# Patient Record
Sex: FEMALE | Race: WHITE | Employment: OTHER | URBAN - METROPOLITAN AREA
[De-identification: names, ages, dates, MRNs, and addresses within clinical notes are randomized per-mention and may not be internally consistent; named-entity substitution may affect disease eponyms.]

---

## 2017-06-22 ENCOUNTER — LAB ENCOUNTER (OUTPATIENT)
Dept: LAB | Facility: HOSPITAL | Age: 82
End: 2017-06-22
Payer: MEDICARE

## 2017-06-22 DIAGNOSIS — E78.00 HYPERCHOLESTEREMIA: ICD-10-CM

## 2017-06-22 DIAGNOSIS — I10 HTN (HYPERTENSION): Primary | ICD-10-CM

## 2017-06-22 PROCEDURE — 85025 COMPLETE CBC W/AUTO DIFF WBC: CPT

## 2017-06-22 PROCEDURE — 84443 ASSAY THYROID STIM HORMONE: CPT

## 2017-06-22 PROCEDURE — 80053 COMPREHEN METABOLIC PANEL: CPT

## 2017-06-22 PROCEDURE — 36415 COLL VENOUS BLD VENIPUNCTURE: CPT

## 2017-06-22 PROCEDURE — 80061 LIPID PANEL: CPT

## 2018-08-30 ENCOUNTER — LAB ENCOUNTER (OUTPATIENT)
Dept: LAB | Facility: HOSPITAL | Age: 83
End: 2018-08-30
Payer: MEDICARE

## 2018-08-30 DIAGNOSIS — E78.5 HYPERLIPIDEMIA: ICD-10-CM

## 2018-08-30 DIAGNOSIS — M10.9 GOUT: Primary | ICD-10-CM

## 2018-08-30 DIAGNOSIS — M19.90 DJD (DEGENERATIVE JOINT DISEASE): ICD-10-CM

## 2018-08-30 DIAGNOSIS — I10 HYPERTENSION: ICD-10-CM

## 2018-08-30 LAB
ALBUMIN SERPL BCP-MCNC: 3.9 G/DL (ref 3.5–4.8)
ALBUMIN/GLOB SERPL: 1.4 {RATIO} (ref 1–2)
ALP SERPL-CCNC: 82 U/L (ref 32–100)
ALT SERPL-CCNC: 23 U/L (ref 14–54)
ANION GAP SERPL CALC-SCNC: 11 MMOL/L (ref 0–18)
AST SERPL-CCNC: 26 U/L (ref 15–41)
BASOPHILS # BLD: 0 K/UL (ref 0–0.2)
BASOPHILS NFR BLD: 1 %
BILIRUB SERPL-MCNC: 1.2 MG/DL (ref 0.3–1.2)
BUN SERPL-MCNC: 11 MG/DL (ref 8–20)
BUN/CREAT SERPL: 11 (ref 10–20)
CALCIUM SERPL-MCNC: 9.2 MG/DL (ref 8.5–10.5)
CHLORIDE SERPL-SCNC: 102 MMOL/L (ref 95–110)
CHOLEST SERPL-MCNC: 174 MG/DL (ref 110–200)
CO2 SERPL-SCNC: 28 MMOL/L (ref 22–32)
CREAT SERPL-MCNC: 1 MG/DL (ref 0.5–1.5)
EOSINOPHIL # BLD: 0.3 K/UL (ref 0–0.7)
EOSINOPHIL NFR BLD: 5 %
ERYTHROCYTE [DISTWIDTH] IN BLOOD BY AUTOMATED COUNT: 13.6 % (ref 11–15)
GLOBULIN PLAS-MCNC: 2.8 G/DL (ref 2.5–3.7)
GLUCOSE SERPL-MCNC: 106 MG/DL (ref 70–99)
HCT VFR BLD AUTO: 44 % (ref 35–48)
HDLC SERPL-MCNC: 37 MG/DL
HGB BLD-MCNC: 14.5 G/DL (ref 12–16)
LDLC SERPL DIRECT ASSAY-MCNC: 68 MG/DL (ref 0–99)
LYMPHOCYTES # BLD: 1.8 K/UL (ref 1–4)
LYMPHOCYTES NFR BLD: 32 %
MCH RBC QN AUTO: 30.9 PG (ref 27–32)
MCHC RBC AUTO-ENTMCNC: 33.1 G/DL (ref 32–37)
MCV RBC AUTO: 93.4 FL (ref 80–100)
MONOCYTES # BLD: 0.5 K/UL (ref 0–1)
MONOCYTES NFR BLD: 8 %
NEUTROPHILS # BLD AUTO: 3.1 K/UL (ref 1.8–7.7)
NEUTROPHILS NFR BLD: 55 %
NONHDLC SERPL-MCNC: 137 MG/DL
OSMOLALITY UR CALC.SUM OF ELEC: 292 MOSM/KG (ref 275–295)
PATIENT FASTING: YES
PLATELET # BLD AUTO: 290 K/UL (ref 140–400)
PMV BLD AUTO: 7.6 FL (ref 7.4–10.3)
POTASSIUM SERPL-SCNC: 3.8 MMOL/L (ref 3.3–5.1)
PROT SERPL-MCNC: 6.7 G/DL (ref 5.9–8.4)
RBC # BLD AUTO: 4.71 M/UL (ref 3.7–5.4)
SODIUM SERPL-SCNC: 141 MMOL/L (ref 136–144)
TRIGL SERPL-MCNC: 401 MG/DL (ref 1–149)
TSH SERPL-ACNC: 3.27 UIU/ML (ref 0.45–5.33)
WBC # BLD AUTO: 5.6 K/UL (ref 4–11)

## 2018-08-30 PROCEDURE — 80053 COMPREHEN METABOLIC PANEL: CPT

## 2018-08-30 PROCEDURE — 80061 LIPID PANEL: CPT

## 2018-08-30 PROCEDURE — 82306 VITAMIN D 25 HYDROXY: CPT

## 2018-08-30 PROCEDURE — 36415 COLL VENOUS BLD VENIPUNCTURE: CPT

## 2018-08-30 PROCEDURE — 85025 COMPLETE CBC W/AUTO DIFF WBC: CPT

## 2018-08-30 PROCEDURE — 84443 ASSAY THYROID STIM HORMONE: CPT

## 2018-08-30 PROCEDURE — 83721 ASSAY OF BLOOD LIPOPROTEIN: CPT

## 2018-08-31 LAB — 25(OH)D3 SERPL-MCNC: 31.5 NG/ML

## 2019-10-10 ENCOUNTER — LAB ENCOUNTER (OUTPATIENT)
Dept: LAB | Facility: HOSPITAL | Age: 84
End: 2019-10-10
Payer: MEDICARE

## 2019-10-10 DIAGNOSIS — H53.2 DIPLOPIA: Primary | ICD-10-CM

## 2019-10-10 DIAGNOSIS — I10 ESSENTIAL HYPERTENSION, MALIGNANT: ICD-10-CM

## 2019-10-10 PROCEDURE — 85652 RBC SED RATE AUTOMATED: CPT

## 2019-10-10 PROCEDURE — 80061 LIPID PANEL: CPT

## 2019-10-10 PROCEDURE — 36415 COLL VENOUS BLD VENIPUNCTURE: CPT

## 2019-10-10 PROCEDURE — 86140 C-REACTIVE PROTEIN: CPT

## 2019-11-05 PROBLEM — Z96.1 BILATERAL PSEUDOPHAKIA: Status: ACTIVE | Noted: 2019-11-05

## 2019-11-05 PROBLEM — H35.3131 EARLY DRY STAGE NONEXUDATIVE AGE-RELATED MACULAR DEGENERATION OF BOTH EYES: Status: ACTIVE | Noted: 2019-11-05

## 2019-11-05 PROBLEM — G70.00 MG (MYASTHENIA GRAVIS) (HCC): Status: ACTIVE | Noted: 2019-11-05

## 2019-11-05 PROBLEM — H04.123 DRY EYE SYNDROME OF BILATERAL LACRIMAL GLANDS: Status: ACTIVE | Noted: 2019-11-05

## 2020-01-09 ENCOUNTER — LAB ENCOUNTER (OUTPATIENT)
Dept: LAB | Facility: HOSPITAL | Age: 85
End: 2020-01-09
Payer: MEDICARE

## 2020-01-09 DIAGNOSIS — I72.9 ANEURYSM OF UNSPECIFIED SITE (HCC): ICD-10-CM

## 2020-01-09 DIAGNOSIS — R49.9 VOICE IMPAIRMENT: ICD-10-CM

## 2020-01-09 DIAGNOSIS — R53.1 ASTHENIA: ICD-10-CM

## 2020-01-09 DIAGNOSIS — G70.00 MYASTHENIA GRAVIS WITHOUT EXACERBATION (HCC): Primary | ICD-10-CM

## 2020-01-09 LAB
ALBUMIN SERPL-MCNC: 3.4 G/DL (ref 3.4–5)
ALBUMIN/GLOB SERPL: 1 {RATIO} (ref 1–2)
ALP LIVER SERPL-CCNC: 80 U/L (ref 55–142)
ALT SERPL-CCNC: 26 U/L (ref 13–56)
ANION GAP SERPL CALC-SCNC: 5 MMOL/L (ref 0–18)
AST SERPL-CCNC: 21 U/L (ref 15–37)
BASOPHILS # BLD AUTO: 0.03 X10(3) UL (ref 0–0.2)
BASOPHILS NFR BLD AUTO: 0.5 %
BILIRUB SERPL-MCNC: 0.8 MG/DL (ref 0.1–2)
BUN BLD-MCNC: 16 MG/DL (ref 7–18)
BUN/CREAT SERPL: 17 (ref 10–20)
CALCIUM BLD-MCNC: 8.8 MG/DL (ref 8.5–10.1)
CHLORIDE SERPL-SCNC: 104 MMOL/L (ref 98–112)
CO2 SERPL-SCNC: 31 MMOL/L (ref 21–32)
CREAT BLD-MCNC: 0.94 MG/DL (ref 0.55–1.02)
DEPRECATED RDW RBC AUTO: 44.9 FL (ref 35.1–46.3)
EOSINOPHIL # BLD AUTO: 0.2 X10(3) UL (ref 0–0.7)
EOSINOPHIL NFR BLD AUTO: 3.1 %
ERYTHROCYTE [DISTWIDTH] IN BLOOD BY AUTOMATED COUNT: 12.9 % (ref 11–15)
GLOBULIN PLAS-MCNC: 3.4 G/DL (ref 2.8–4.4)
GLUCOSE BLD-MCNC: 127 MG/DL (ref 70–99)
HCT VFR BLD AUTO: 42.8 % (ref 35–48)
HGB BLD-MCNC: 13.9 G/DL (ref 12–16)
IMM GRANULOCYTES # BLD AUTO: 0.02 X10(3) UL (ref 0–1)
IMM GRANULOCYTES NFR BLD: 0.3 %
LYMPHOCYTES # BLD AUTO: 1.57 X10(3) UL (ref 1–4)
LYMPHOCYTES NFR BLD AUTO: 24.5 %
M PROTEIN MFR SERPL ELPH: 6.8 G/DL (ref 6.4–8.2)
MCH RBC QN AUTO: 30.8 PG (ref 26–34)
MCHC RBC AUTO-ENTMCNC: 32.5 G/DL (ref 31–37)
MCV RBC AUTO: 94.7 FL (ref 80–100)
MONOCYTES # BLD AUTO: 0.47 X10(3) UL (ref 0.1–1)
MONOCYTES NFR BLD AUTO: 7.3 %
NEUTROPHILS # BLD AUTO: 4.13 X10 (3) UL (ref 1.5–7.7)
NEUTROPHILS # BLD AUTO: 4.13 X10(3) UL (ref 1.5–7.7)
NEUTROPHILS NFR BLD AUTO: 64.3 %
OSMOLALITY SERPL CALC.SUM OF ELEC: 293 MOSM/KG (ref 275–295)
PATIENT FASTING Y/N/NP: YES
PLATELET # BLD AUTO: 289 10(3)UL (ref 150–450)
POTASSIUM SERPL-SCNC: 3.5 MMOL/L (ref 3.5–5.1)
RBC # BLD AUTO: 4.52 X10(6)UL (ref 3.8–5.3)
SODIUM SERPL-SCNC: 140 MMOL/L (ref 136–145)
WBC # BLD AUTO: 6.4 X10(3) UL (ref 4–11)

## 2020-01-09 PROCEDURE — 80053 COMPREHEN METABOLIC PANEL: CPT

## 2020-01-09 PROCEDURE — 85025 COMPLETE CBC W/AUTO DIFF WBC: CPT

## 2020-01-09 PROCEDURE — 36415 COLL VENOUS BLD VENIPUNCTURE: CPT

## 2021-04-11 ENCOUNTER — OFFICE VISIT (OUTPATIENT)
Dept: FAMILY MEDICINE CLINIC | Facility: CLINIC | Age: 86
End: 2021-04-11
Payer: MEDICARE

## 2021-04-11 VITALS — TEMPERATURE: 99 F

## 2021-04-11 DIAGNOSIS — H61.21 IMPACTED CERUMEN OF RIGHT EAR: Primary | ICD-10-CM

## 2021-04-11 PROCEDURE — 69209 REMOVE IMPACTED EAR WAX UNI: CPT | Performed by: NURSE PRACTITIONER

## 2021-04-11 NOTE — PROGRESS NOTES
CHIEF COMPLAINT:   Patient presents with:  Cerumen Impaction      HPI:   Precious Adame is a 80year old female who presents to clinic today with complaints of clogged ears. Patient requesting ear flush.  Patient reports ongoing issue with cerumen build agreed to proceed with procedure via water removal and/or use of curette. Location: Right ear   Indication: TM not visible,fullness. Prep: Warm water via ear elephant. Hydrogen Peroxide not used  Removal: Lavage alone was was successful.   not need for Infections, such as an outer ear infection (external otitis)  · Skin disease, such as eczema  · Autoimmune diseases, such as lupus  · A narrowed ear canal from birth, chronic inflammation, or injury  · Too much earwax because of injury  · Too much earwax b earwax if you have a health condition that causes it, such as eczema.  In other cases, you may be able to prevent earwax buildup by:   · Using ear drops once a week  · Having a regular ear cleaning about every 6 months  · Not using cotton swabs in the ear

## 2023-04-12 ENCOUNTER — OFFICE VISIT (OUTPATIENT)
Dept: FAMILY MEDICINE CLINIC | Facility: CLINIC | Age: 88
End: 2023-04-12
Payer: MEDICARE

## 2023-04-12 VITALS — TEMPERATURE: 98 F

## 2023-04-12 DIAGNOSIS — H61.23 BILATERAL IMPACTED CERUMEN: Primary | ICD-10-CM

## 2023-04-12 PROCEDURE — 69209 REMOVE IMPACTED EAR WAX UNI: CPT | Performed by: NURSE PRACTITIONER

## 2024-09-11 ENCOUNTER — OFFICE VISIT (OUTPATIENT)
Dept: NEUROLOGY | Facility: CLINIC | Age: 89
End: 2024-09-11
Payer: MEDICARE

## 2024-09-11 ENCOUNTER — LAB ENCOUNTER (OUTPATIENT)
Dept: LAB | Facility: HOSPITAL | Age: 89
End: 2024-09-11
Attending: Other
Payer: MEDICARE

## 2024-09-11 VITALS — DIASTOLIC BLOOD PRESSURE: 67 MMHG | HEART RATE: 76 BPM | OXYGEN SATURATION: 96 % | SYSTOLIC BLOOD PRESSURE: 115 MMHG

## 2024-09-11 DIAGNOSIS — G70.00 MG (MYASTHENIA GRAVIS) (HCC): Primary | ICD-10-CM

## 2024-09-11 DIAGNOSIS — G70.00 MG (MYASTHENIA GRAVIS) (HCC): ICD-10-CM

## 2024-09-11 LAB
ALBUMIN SERPL-MCNC: 4.1 G/DL (ref 3.2–4.8)
ALBUMIN/GLOB SERPL: 1.5 {RATIO} (ref 1–2)
ALP LIVER SERPL-CCNC: 97 U/L
ALT SERPL-CCNC: 15 U/L
ANION GAP SERPL CALC-SCNC: 6 MMOL/L (ref 0–18)
AST SERPL-CCNC: 29 U/L (ref ?–34)
BASOPHILS # BLD AUTO: 0.03 X10(3) UL (ref 0–0.2)
BASOPHILS NFR BLD AUTO: 0.5 %
BILIRUB SERPL-MCNC: 1.2 MG/DL (ref 0.2–0.9)
BUN BLD-MCNC: 17 MG/DL (ref 9–23)
BUN/CREAT SERPL: 20.2 (ref 10–20)
CALCIUM BLD-MCNC: 9.7 MG/DL (ref 8.7–10.4)
CHLORIDE SERPL-SCNC: 100 MMOL/L (ref 98–112)
CO2 SERPL-SCNC: 32 MMOL/L (ref 21–32)
CREAT BLD-MCNC: 0.84 MG/DL
DEPRECATED RDW RBC AUTO: 45 FL (ref 35.1–46.3)
EGFRCR SERPLBLD CKD-EPI 2021: 66 ML/MIN/1.73M2 (ref 60–?)
EOSINOPHIL # BLD AUTO: 0.18 X10(3) UL (ref 0–0.7)
EOSINOPHIL NFR BLD AUTO: 2.8 %
ERYTHROCYTE [DISTWIDTH] IN BLOOD BY AUTOMATED COUNT: 13.5 % (ref 11–15)
FASTING STATUS PATIENT QL REPORTED: NO
GLOBULIN PLAS-MCNC: 2.7 G/DL (ref 2–3.5)
GLUCOSE BLD-MCNC: 93 MG/DL (ref 70–99)
HCT VFR BLD AUTO: 37.5 %
HGB BLD-MCNC: 12.6 G/DL
IMM GRANULOCYTES # BLD AUTO: 0.06 X10(3) UL (ref 0–1)
IMM GRANULOCYTES NFR BLD: 0.9 %
LYMPHOCYTES # BLD AUTO: 1.62 X10(3) UL (ref 1–4)
LYMPHOCYTES NFR BLD AUTO: 25.1 %
MCH RBC QN AUTO: 30.5 PG (ref 26–34)
MCHC RBC AUTO-ENTMCNC: 33.6 G/DL (ref 31–37)
MCV RBC AUTO: 90.8 FL
MONOCYTES # BLD AUTO: 0.76 X10(3) UL (ref 0.1–1)
MONOCYTES NFR BLD AUTO: 11.8 %
NEUTROPHILS # BLD AUTO: 3.81 X10 (3) UL (ref 1.5–7.7)
NEUTROPHILS # BLD AUTO: 3.81 X10(3) UL (ref 1.5–7.7)
NEUTROPHILS NFR BLD AUTO: 58.9 %
OSMOLALITY SERPL CALC.SUM OF ELEC: 287 MOSM/KG (ref 275–295)
PLATELET # BLD AUTO: 239 10(3)UL (ref 150–450)
POTASSIUM SERPL-SCNC: 4.2 MMOL/L (ref 3.5–5.1)
PROT SERPL-MCNC: 6.8 G/DL (ref 5.7–8.2)
RBC # BLD AUTO: 4.13 X10(6)UL
SODIUM SERPL-SCNC: 138 MMOL/L (ref 136–145)
WBC # BLD AUTO: 6.5 X10(3) UL (ref 4–11)

## 2024-09-11 PROCEDURE — 85025 COMPLETE CBC W/AUTO DIFF WBC: CPT | Performed by: OTHER

## 2024-09-11 PROCEDURE — 36415 COLL VENOUS BLD VENIPUNCTURE: CPT

## 2024-09-11 PROCEDURE — 80053 COMPREHEN METABOLIC PANEL: CPT

## 2024-09-11 PROCEDURE — 99204 OFFICE O/P NEW MOD 45 MIN: CPT | Performed by: OTHER

## 2024-09-11 RX ORDER — MYCOPHENOLATE MOFETIL 500 MG/1
500 TABLET ORAL 2 TIMES DAILY
Qty: 180 TABLET | Refills: 3 | Status: SHIPPED | OUTPATIENT
Start: 2024-09-11

## 2024-09-11 RX ORDER — MYCOPHENOLATE MOFETIL 500 MG/1
1 TABLET ORAL 2 TIMES DAILY
COMMUNITY
Start: 2021-05-12 | End: 2024-09-11

## 2024-09-11 RX ORDER — GABAPENTIN 100 MG/1
300 CAPSULE ORAL NIGHTLY
COMMUNITY

## 2024-09-11 NOTE — PROGRESS NOTES
[facility] NEUROSCIENCES [address], [facility] [address]  [facility] [address]  [phone]            Neurology Initial Visit     Referred By: Dr. Stewart ref. provider found    Chief Complaint:   Chief Complaint   Patient presents with    Neurologic Problem     NPT with current Myasthenia Gravis. Pt used to see Dr. Queta Limon. Pt is currently having a periodic cough/sneeze, she is unaware what causes this. Pt has been having blurring in her eyesight, she has been following with Ophthalmology, who advised that the lower lid on her eye is trapping the fluid. Feet neuropathy and sciatica.       HPI:     Sharda Rai is a 89 year old female, who presents for myasthenia gravis.  Patient with history of myasthenia gravis since 2019.  Developed ocular symptoms, proptosis and double vision.  She was seropositive.  At first she was treated with IVIG for few months, she was treated with steroids, eventually she was switched from steroids to mycophenolate 500 g twice a day and she was doing quite well and for this couple years as reported first visit with me in September 2024.  She was not sure but she might have had some increased frequency of clearing her throat.  But no choking, she was eating fine..     Past Medical History:    Blepharitis    Dermatochalasis    Dry eye syndrome of both eyes    Macular pucker, bilateral    Pseudophakia, both eyes       Past Surgical History:   Procedure Laterality Date    Cataract extraction w/  intraocular lens implant Left 02/20/2013    Dr. Roseann leo     Cataract extraction w/  intraocular lens implant Right     Yag capsulotomy - od - right eye Right 04/10/2007       Social history:  History   Smoking Status    Never   Smokeless Tobacco    Never     History   Alcohol Use Never     History   Drug Use Unknown       Family History   Problem Relation Age of Onset    Retinal detachment Father     Glaucoma Mother          Current Outpatient Medications:      gabapentin 100 MG Oral Cap, Take 3 capsules (300 mg total) by mouth nightly., Disp: , Rfl:     Mycophenolate Mofetil 500 MG Oral Tab, Take 1 tablet (500 mg total) by mouth 2 (two) times daily., Disp: 180 tablet, Rfl: 3    hydrochlorothiazide 25 MG Oral Tab, Take 1 tablet (25 mg total) by mouth daily., Disp: , Rfl:     Metoprolol Succinate ER 50 MG Oral Tablet 24 Hr, Take 1 tablet (50 mg total) by mouth every morning., Disp: , Rfl:     atorvastatin 40 MG Oral Tab, , Disp: , Rfl:     aspirin EC 81 MG Oral Tab EC, Take 1 tablet (81 mg total) by mouth daily., Disp: , Rfl:     Cholecalciferol (VITAMIN D3) 014635 UNIT/GM Does not apply Powder, Vitamin D3, Disp: , Rfl:     B Complex Vitamins (VITAMIN B COMPLEX OR), vitamin B complex, Disp: , Rfl:     PANZYGA 5 GM/50ML Intravenous Solution, , Disp: , Rfl:     Allergies   Allergen Reactions    Shellfish DIARRHEA       ROS:   As in HPI, the rest of the 14 system review was done and was negative      Physical Exam:  Vitals:    09/11/24 1526   BP: 115/67   Pulse: 76   SpO2: 96%       General: No apparent distress, well nourished, well groomed.  Head- Normocephalic, atraumatic  Eyes- No redness or swelling  ENT- Hearing intake, normal glutition  Neck- No masses or adenopathy  Cv: pulses were palpable and normal, no cyanosis or edema     Neurological:     Mental Status- Alert and oriented x3.  Normal attention span and concentration  Thought process intact  Memory intact- recent and remote  Mood intact  Fund of knowledge appropriate for education and age    Language intact including: comprehension, naming, repetition, vocabulary    Cranial Nerves:    V. Facial sensation intact  VII. Face symmetric, no facial weakness  VIII. Hearing intact to whisper.  IX. Pallet elevates symmetrically.  XI. Shoulder shrug is intact  XII. Tongue is midline    Motor Exam:  Muscle tone normal  No atrophy or fasciculations  Strength- upper extremities 5/5 proximally and distally                     Rapid alternating movements intact    Gait:  Normal posture  Normal physiologic      Labs:    Lab Results   Component Value Date    TSH 3.27 08/30/2018     Lab Results   Component Value Date    HDL 42 10/10/2019    LDL 62 10/10/2019    TRIG 204 (H) 10/10/2019     Lab Results   Component Value Date    HGB 13.9 01/09/2020    HCT 42.8 01/09/2020    MCV 94.7 01/09/2020    WBC 6.4 01/09/2020    .0 01/09/2020      Lab Results   Component Value Date    BUN 16 01/09/2020    CA 8.8 01/09/2020    ALT 26 01/09/2020    AST 21 01/09/2020    ALB 3.4 01/09/2020     01/09/2020    K 3.5 01/09/2020     01/09/2020    CO2 31.0 01/09/2020      I have reviewed labs.      Assessment   1. MG (myasthenia gravis) (HCC)  Seropositive myasthenia gravis, no thymoma.  We discussed possibility of increasing the dose of mycophenolate and may be doing bridging with IVIG if she gets worse in terms of her bulbar symptoms.  Patient declined that option at this time.  She was instructed to keep them watch over her symptoms and if any worsening she will have to call us back and we will discuss changing her treatment again.    - CBC With Differential With Platelet  - Comp Metabolic Panel (14) [E]; Future           Education and counseling provided to patient. Instructed patient to call my office or seek medical attention immediately if symptoms worsen.  Patient verbalized understanding of information given. All questions were answered. All side effects of drugs were discussed.     Return to clinic in: Return in about 6 months (around 3/11/2025).    William Castillo MD

## 2024-10-18 ENCOUNTER — APPOINTMENT (OUTPATIENT)
Dept: CT IMAGING | Facility: HOSPITAL | Age: 89
End: 2024-10-18
Attending: STUDENT IN AN ORGANIZED HEALTH CARE EDUCATION/TRAINING PROGRAM
Payer: MEDICARE

## 2024-10-18 ENCOUNTER — APPOINTMENT (OUTPATIENT)
Dept: ULTRASOUND IMAGING | Facility: HOSPITAL | Age: 89
End: 2024-10-18
Attending: STUDENT IN AN ORGANIZED HEALTH CARE EDUCATION/TRAINING PROGRAM
Payer: MEDICARE

## 2024-10-18 ENCOUNTER — HOSPITAL ENCOUNTER (INPATIENT)
Facility: HOSPITAL | Age: 89
LOS: 3 days | Discharge: HOME | End: 2024-10-21
Attending: STUDENT IN AN ORGANIZED HEALTH CARE EDUCATION/TRAINING PROGRAM | Admitting: STUDENT IN AN ORGANIZED HEALTH CARE EDUCATION/TRAINING PROGRAM
Payer: MEDICARE

## 2024-10-18 DIAGNOSIS — K81.0 ACUTE CHOLECYSTITIS: Primary | ICD-10-CM

## 2024-10-18 LAB
ALBUMIN SERPL-MCNC: 4 G/DL (ref 3.2–4.8)
ALBUMIN/GLOB SERPL: 1.7 {RATIO} (ref 1–2)
ALP LIVER SERPL-CCNC: 116 U/L
ALT SERPL-CCNC: 34 U/L
ANION GAP SERPL CALC-SCNC: 5 MMOL/L (ref 0–18)
AST SERPL-CCNC: 79 U/L (ref ?–34)
BASOPHILS # BLD AUTO: 0.01 X10(3) UL (ref 0–0.2)
BASOPHILS NFR BLD AUTO: 0.1 %
BILIRUB SERPL-MCNC: 0.8 MG/DL (ref 0.2–0.9)
BUN BLD-MCNC: 21 MG/DL (ref 9–23)
BUN/CREAT SERPL: 25.9 (ref 10–20)
CALCIUM BLD-MCNC: 9.1 MG/DL (ref 8.7–10.4)
CHLORIDE SERPL-SCNC: 105 MMOL/L (ref 98–112)
CO2 SERPL-SCNC: 32 MMOL/L (ref 21–32)
CREAT BLD-MCNC: 0.81 MG/DL
DEPRECATED RDW RBC AUTO: 45.5 FL (ref 35.1–46.3)
EGFRCR SERPLBLD CKD-EPI 2021: 68 ML/MIN/1.73M2 (ref 60–?)
EOSINOPHIL # BLD AUTO: 0.15 X10(3) UL (ref 0–0.7)
EOSINOPHIL NFR BLD AUTO: 2.2 %
ERYTHROCYTE [DISTWIDTH] IN BLOOD BY AUTOMATED COUNT: 13.3 % (ref 11–15)
GLOBULIN PLAS-MCNC: 2.4 G/DL (ref 2–3.5)
GLUCOSE BLD-MCNC: 130 MG/DL (ref 70–99)
GLUCOSE BLDC GLUCOMTR-MCNC: 125 MG/DL (ref 70–99)
HCT VFR BLD AUTO: 38 %
HGB BLD-MCNC: 12.3 G/DL
IMM GRANULOCYTES # BLD AUTO: 0.02 X10(3) UL (ref 0–1)
IMM GRANULOCYTES NFR BLD: 0.3 %
LACTATE SERPL-SCNC: 1.4 MMOL/L (ref 0.5–2)
LIPASE SERPL-CCNC: 24 U/L (ref 12–53)
LYMPHOCYTES # BLD AUTO: 1.51 X10(3) UL (ref 1–4)
LYMPHOCYTES NFR BLD AUTO: 22.4 %
MCH RBC QN AUTO: 30.2 PG (ref 26–34)
MCHC RBC AUTO-ENTMCNC: 32.4 G/DL (ref 31–37)
MCV RBC AUTO: 93.4 FL
MONOCYTES # BLD AUTO: 0.47 X10(3) UL (ref 0.1–1)
MONOCYTES NFR BLD AUTO: 7 %
NEUTROPHILS # BLD AUTO: 4.57 X10 (3) UL (ref 1.5–7.7)
NEUTROPHILS # BLD AUTO: 4.57 X10(3) UL (ref 1.5–7.7)
NEUTROPHILS NFR BLD AUTO: 68 %
OSMOLALITY SERPL CALC.SUM OF ELEC: 299 MOSM/KG (ref 275–295)
PLATELET # BLD AUTO: 247 10(3)UL (ref 150–450)
POTASSIUM SERPL-SCNC: 3.6 MMOL/L (ref 3.5–5.1)
PROT SERPL-MCNC: 6.4 G/DL (ref 5.7–8.2)
RBC # BLD AUTO: 4.07 X10(6)UL
SODIUM SERPL-SCNC: 142 MMOL/L (ref 136–145)
WBC # BLD AUTO: 6.7 X10(3) UL (ref 4–11)

## 2024-10-18 PROCEDURE — 99223 1ST HOSP IP/OBS HIGH 75: CPT | Performed by: HOSPITALIST

## 2024-10-18 PROCEDURE — 76705 ECHO EXAM OF ABDOMEN: CPT | Performed by: STUDENT IN AN ORGANIZED HEALTH CARE EDUCATION/TRAINING PROGRAM

## 2024-10-18 RX ORDER — MORPHINE SULFATE 4 MG/ML
4 INJECTION, SOLUTION INTRAMUSCULAR; INTRAVENOUS EVERY 2 HOUR PRN
Status: DISCONTINUED | OUTPATIENT
Start: 2024-10-18 | End: 2024-10-20

## 2024-10-18 RX ORDER — MORPHINE SULFATE 2 MG/ML
2 INJECTION, SOLUTION INTRAMUSCULAR; INTRAVENOUS EVERY 2 HOUR PRN
Status: DISCONTINUED | OUTPATIENT
Start: 2024-10-18 | End: 2024-10-21

## 2024-10-18 RX ORDER — MELOXICAM 15 MG/1
1 TABLET ORAL DAILY
COMMUNITY
Start: 2024-03-14

## 2024-10-18 RX ORDER — CHOLECALCIFEROL (VITAMIN D3) 25 MCG
1000 TABLET ORAL DAILY
COMMUNITY

## 2024-10-18 RX ORDER — METRONIDAZOLE 500 MG/100ML
500 INJECTION, SOLUTION INTRAVENOUS ONCE
Status: COMPLETED | OUTPATIENT
Start: 2024-10-18 | End: 2024-10-18

## 2024-10-18 RX ORDER — HYDRALAZINE HYDROCHLORIDE 20 MG/ML
10 INJECTION INTRAMUSCULAR; INTRAVENOUS EVERY 4 HOURS PRN
Status: DISCONTINUED | OUTPATIENT
Start: 2024-10-18 | End: 2024-10-21

## 2024-10-18 RX ORDER — METRONIDAZOLE 500 MG/100ML
500 INJECTION, SOLUTION INTRAVENOUS EVERY 12 HOURS
Status: DISCONTINUED | OUTPATIENT
Start: 2024-10-19 | End: 2024-10-19

## 2024-10-18 RX ORDER — ONDANSETRON 2 MG/ML
4 INJECTION INTRAMUSCULAR; INTRAVENOUS ONCE
Status: COMPLETED | OUTPATIENT
Start: 2024-10-18 | End: 2024-10-18

## 2024-10-18 RX ORDER — PANCRELIPASE 36000; 180000; 114000 [USP'U]/1; [USP'U]/1; [USP'U]/1
1-2 CAPSULE, DELAYED RELEASE PELLETS ORAL
COMMUNITY

## 2024-10-18 RX ORDER — PANTOPRAZOLE SODIUM 40 MG/1
40 TABLET, DELAYED RELEASE ORAL EVERY MORNING
COMMUNITY

## 2024-10-18 RX ORDER — DOXEPIN HYDROCHLORIDE 50 MG/1
1 CAPSULE ORAL DAILY
COMMUNITY

## 2024-10-18 RX ORDER — MORPHINE SULFATE 2 MG/ML
2 INJECTION, SOLUTION INTRAMUSCULAR; INTRAVENOUS ONCE
Status: DISCONTINUED | OUTPATIENT
Start: 2024-10-18 | End: 2024-10-21

## 2024-10-18 RX ORDER — DEXTROSE MONOHYDRATE AND SODIUM CHLORIDE 5; .45 G/100ML; G/100ML
INJECTION, SOLUTION INTRAVENOUS CONTINUOUS
Status: DISCONTINUED | OUTPATIENT
Start: 2024-10-18 | End: 2024-10-20

## 2024-10-18 RX ORDER — LOPERAMIDE HYDROCHLORIDE 2 MG/1
2 CAPSULE ORAL EVERY EVENING
COMMUNITY

## 2024-10-18 RX ORDER — ATORVASTATIN CALCIUM 40 MG/1
40 TABLET, FILM COATED ORAL NIGHTLY
Status: DISCONTINUED | OUTPATIENT
Start: 2024-10-18 | End: 2024-10-21

## 2024-10-18 RX ORDER — ONDANSETRON 2 MG/ML
4 INJECTION INTRAMUSCULAR; INTRAVENOUS EVERY 6 HOURS PRN
Status: DISCONTINUED | OUTPATIENT
Start: 2024-10-18 | End: 2024-10-21

## 2024-10-18 RX ORDER — GABAPENTIN 300 MG/1
300 CAPSULE ORAL NIGHTLY
Status: DISCONTINUED | OUTPATIENT
Start: 2024-10-18 | End: 2024-10-21

## 2024-10-18 NOTE — ED PROVIDER NOTES
[place] Emergency Department Note  Patient: Sharda Rai Age: 89 year old Sex: female      MRN: [id number]  : 1932    Patient Seen in: [facility] Emergency Department    History     Chief Complaint   Patient presents with    Abdomen/Flank Pain     Stated Complaint:     History obtained from: Patient    89-year-old female with past medical history of pancreatic cysts complicated by pancreatic insufficiency, cholelithiasis, hyperlipidemia, myasthenia gravis on CellCept and Mestinon, presenting today for evaluation of epigastric abdominal pain.  She states that she was eating dinner approximately 1 to 2 hours prior to arrival when suddenly after eating, she began having severe pain in her epigastrium.  She endorses nausea but no vomiting.  No diarrhea.    Review of Systems:  Review of Systems  Positive for stated complaint: . Constitutional and vital signs reviewed. All other systems reviewed and negative except as noted above.    Patient History:  Past Medical History:    Blepharitis    Dermatochalasis    Dry eye syndrome of both eyes    Essential hypertension    Gallstones    Hyperlipidemia    Macular pucker, bilateral    Myasthenia gravis (HCC)    Pancreatic insufficiency (HCC)    Pseudophakia, both eyes       Past Surgical History:   Procedure Laterality Date    Cataract extraction w/  intraocular lens implant Left 2013    Dr. Roseann leo     Cataract extraction w/  intraocular lens implant Right     Yag capsulotomy - od - right eye Right 04/10/2007        Family History   Problem Relation Age of Onset    Retinal detachment Father     Glaucoma Mother        Specific Social Determinants of Health:   Social History     Socioeconomic History    Marital status:    Tobacco Use    Smoking status: Never    Smokeless tobacco: Never   Vaping Use    Vaping status: Never Used   Substance and Sexual Activity    Alcohol use: Never    Drug use: Never     Social Drivers of Health      Received  from [facility], [facility]    Social Connections    Received from [facility]    Housing Stability           PSFH elements reviewed from today and agreed except as otherwise stated in HPI.    Physical Exam     ED Triage Vitals [10/18/24 1840]   BP 90/81   Pulse 73   Resp 24   Temp 97.5 °F (36.4 °C)   Temp src Oral   SpO2 100 %   O2 Device None (Room air)       Current:/64   Pulse 79   Temp 97.5 °F (36.4 °C) (Oral)   Resp 15   Ht 154.9 cm (5' 1\")   Wt 63.5 kg   LMP  (LMP Unknown)   SpO2 97%   BMI 26.45 kg/m²         Physical Exam  Constitutional:       General: She is not in acute distress.  HENT:      Head: Normocephalic and atraumatic.      Mouth/Throat:      Mouth: Mucous membranes are moist.   Eyes:      Extraocular Movements: Extraocular movements intact.   Cardiovascular:      Rate and Rhythm: Normal rate and regular rhythm.      Heart sounds: Normal heart sounds.   Pulmonary:      Effort: Pulmonary effort is normal. No respiratory distress.      Breath sounds: Normal breath sounds.   Abdominal:      Palpations: Abdomen is soft.      Tenderness: There is abdominal tenderness in the epigastric area and left upper quadrant.   Skin:     General: Skin is warm and dry.      Capillary Refill: Capillary refill takes less than 2 seconds.      Findings: No rash.   Neurological:      General: No focal deficit present.      Mental Status: She is alert and oriented to person, place, and time.   Psychiatric:         Mood and Affect: Mood normal.         Behavior: Behavior normal.         ED Course   Labs:   Labs Reviewed   COMP METABOLIC PANEL (14) - Abnormal; Notable for the following components:       Result Value    Glucose 130 (*)     BUN/CREA Ratio 25.9 (*)     Calculated Osmolality 299 (*)     AST 79 (*)     All other components within normal limits   POCT GLUCOSE - Abnormal; Notable for the following components:    POC Glucose  125 (*)     All  other components within normal limits   LIPASE - Normal   CBC WITH DIFFERENTIAL WITH PLATELET   URINALYSIS WITH CULTURE REFLEX   LACTIC ACID, PLASMA   RAINBOW DRAW LAVENDER   RAINBOW DRAW LIGHT GREEN   RAINBOW DRAW BLUE   BLOOD CULTURE   BLOOD CULTURE     Radiology findings:  I personally reviewed the images.   US GALLBLADDER (CPT=76705)    Result Date: 10/18/2024  CONCLUSION:  1.  2 cm lobulated cystic focus in the pancreatic head which has thin internal septations.  This is concerning for pancreatic cystic neoplasm.  There is pancreatic ductal dilatation.  Distal pancreas is obscured by overlying bowel. 2.  Gallbladder has ultrasound findings of acute cholecystitis with:  Wall thickening, cholelithiasis, and positive sonographic Ramsay's sign. 3.  Common bile duct is enlarged at 9 mm.    Dictated by (CST): Tone Saldana MD on 10/18/2024 at 7:49 PM     Finalized by (CST): Tone Saldana MD on 10/18/2024 at 7:52 PM           EKG as interpreted by me: Ventricular rate 77, normal sinus rhythm, normal axis, no parable prolongation, narrow QRS, QTc 440 ms, no ST segment elevations or depressions, no abnormal T wave inversions  Cardiac Monitor: Interpreted by me.   Pulse Readings from Last 1 Encounters:   10/18/24 79   , sinus,     External non-ED records reviewed independently by me: MRI pancreatic cyst from 7/23/24 reviewed and is as follows:    IMPRESSION:   1. Innumerable cysts throughout the pancreas. Some of the intervening septations of exhibit mild   enhancement, but no solid, enhancing soft tissue component is found. The pancreatic duct is mildly   dilated. 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   2. Cholelithiasis.   3. Multiple additional cysts noted throughout the liver as well as in both kidneys.   4. Innumerable colonic diverticula.     MDM   89-year-old female with a past medical history of pancreatic cyst complicated by pancreatic insufficiency, cholelithiasis,  cystic liver disease, hyperlipidemia, myasthenia gravis on CellCept and Mestinon presenting for evaluation of postprandial epigastric abdominal pain over the past 1 to 2 hours.  On exam, she has reproducible epigastric abdominal tenderness.  Appears generally uncomfortable.    Differential diagnoses considered includes, but is not limited to: Gastritis, pancreatitis, symptomatic gallstones, acute cholecystitis    Will obtain the following tests: CBC, CMP, lipase, urinalysis, gallbladder ultrasound, CT abdomen/pelvis with IV contrast  Please see ED course for my independent review of these tests/imaging results.    Initial Medications/Therapeutics administered: Morphine, Zofran, IV fluid bolus    Chronic conditions affecting care: Pancreatic cyst complicated by pancreatic insufficiency, cholelithiasis, cystic liver disease, hyperlipidemia    Workup and medications considered but not ordered: None    Social Determinants of Health that impacted care: None    ED Course as of 10/18/24 2059  ------------------------------------------------------------  Time: 10/18 2021  Comment: Independently reviewed the ultrasound images that show evidence of stones with gallbladder wall thickening overall concerning for acute cholecystitis.  Started on antibiotics.  Lactate and blood cultures drawn.  Will plan to admit with surgical consult.  ------------------------------------------------------------  Time: 10/18 2058  Comment: I discussed patient's case with the [facility] accepted the patient for admission.  Also discussed with Dr. Toribio who is made aware of new consult.  Will make n.p.o. after midnight.          Disposition and Plan     Clinical Impression:  1. Acute cholecystitis        Disposition:  Admit    Follow-up:  No follow-up provider specified.    Medications Prescribed:  Current Discharge Medication List          Hospital Problems       Present on Admission  Date Reviewed: 9/11/2024            ICD-10-CM  Noted POA    * (Principal) Acute cholecystitis K81.0 10/18/2024 Unknown        This note may have been created using voice dictation technology and may include inadvertent errors.      Carleneshirley Roberts MD  Emergency Medicine

## 2024-10-19 ENCOUNTER — ANESTHESIA (OUTPATIENT)
Dept: SURGERY | Facility: HOSPITAL | Age: 89
End: 2024-10-19
Payer: MEDICARE

## 2024-10-19 ENCOUNTER — ANESTHESIA EVENT (OUTPATIENT)
Dept: SURGERY | Facility: HOSPITAL | Age: 89
End: 2024-10-19
Payer: MEDICARE

## 2024-10-19 LAB
BILIRUB UR QL: NEGATIVE
CLARITY UR: CLEAR
COLOR UR: YELLOW
GLUCOSE UR-MCNC: NORMAL MG/DL
HGB UR QL STRIP.AUTO: NEGATIVE
KETONES UR-MCNC: NEGATIVE MG/DL
LEUKOCYTE ESTERASE UR QL STRIP.AUTO: NEGATIVE
NITRITE UR QL STRIP.AUTO: NEGATIVE
PH UR: 6.5 [PH] (ref 5–8)
PROT UR-MCNC: NEGATIVE MG/DL
SP GR UR STRIP: 1.01 (ref 1–1.03)
UROBILINOGEN UR STRIP-ACNC: NORMAL

## 2024-10-19 PROCEDURE — 8E0W4CZ ROBOTIC ASSISTED PROCEDURE OF TRUNK REGION, PERCUTANEOUS ENDOSCOPIC APPROACH: ICD-10-PCS | Performed by: SURGERY

## 2024-10-19 PROCEDURE — 0FT44ZZ RESECTION OF GALLBLADDER, PERCUTANEOUS ENDOSCOPIC APPROACH: ICD-10-PCS | Performed by: SURGERY

## 2024-10-19 PROCEDURE — 99223 1ST HOSP IP/OBS HIGH 75: CPT | Performed by: SURGERY

## 2024-10-19 PROCEDURE — 99233 SBSQ HOSP IP/OBS HIGH 50: CPT | Performed by: HOSPITALIST

## 2024-10-19 RX ORDER — ACETAMINOPHEN 325 MG/1
650 TABLET ORAL EVERY 6 HOURS PRN
Status: DISCONTINUED | OUTPATIENT
Start: 2024-10-19 | End: 2024-10-21

## 2024-10-19 RX ORDER — MORPHINE SULFATE 10 MG/ML
6 INJECTION, SOLUTION INTRAMUSCULAR; INTRAVENOUS EVERY 10 MIN PRN
Status: DISCONTINUED | OUTPATIENT
Start: 2024-10-19 | End: 2024-10-19 | Stop reason: HOSPADM

## 2024-10-19 RX ORDER — ONDANSETRON 2 MG/ML
INJECTION INTRAMUSCULAR; INTRAVENOUS AS NEEDED
Status: DISCONTINUED | OUTPATIENT
Start: 2024-10-19 | End: 2024-10-19 | Stop reason: SURG

## 2024-10-19 RX ORDER — HYDROCODONE BITARTRATE AND ACETAMINOPHEN 5; 325 MG/1; MG/1
1 TABLET ORAL EVERY 4 HOURS PRN
Status: DISCONTINUED | OUTPATIENT
Start: 2024-10-19 | End: 2024-10-21

## 2024-10-19 RX ORDER — MORPHINE SULFATE 4 MG/ML
2 INJECTION, SOLUTION INTRAMUSCULAR; INTRAVENOUS EVERY 10 MIN PRN
Status: DISCONTINUED | OUTPATIENT
Start: 2024-10-19 | End: 2024-10-19 | Stop reason: HOSPADM

## 2024-10-19 RX ORDER — GLYCOPYRROLATE 0.2 MG/ML
INJECTION, SOLUTION INTRAMUSCULAR; INTRAVENOUS AS NEEDED
Status: DISCONTINUED | OUTPATIENT
Start: 2024-10-19 | End: 2024-10-19 | Stop reason: SURG

## 2024-10-19 RX ORDER — NALOXONE HYDROCHLORIDE 0.4 MG/ML
80 INJECTION, SOLUTION INTRAMUSCULAR; INTRAVENOUS; SUBCUTANEOUS AS NEEDED
Status: DISCONTINUED | OUTPATIENT
Start: 2024-10-19 | End: 2024-10-19 | Stop reason: HOSPADM

## 2024-10-19 RX ORDER — HYDROMORPHONE HYDROCHLORIDE 1 MG/ML
0.6 INJECTION, SOLUTION INTRAMUSCULAR; INTRAVENOUS; SUBCUTANEOUS EVERY 5 MIN PRN
Status: DISCONTINUED | OUTPATIENT
Start: 2024-10-19 | End: 2024-10-19 | Stop reason: HOSPADM

## 2024-10-19 RX ORDER — SODIUM CHLORIDE, SODIUM LACTATE, POTASSIUM CHLORIDE, CALCIUM CHLORIDE 600; 310; 30; 20 MG/100ML; MG/100ML; MG/100ML; MG/100ML
INJECTION, SOLUTION INTRAVENOUS CONTINUOUS PRN
Status: DISCONTINUED | OUTPATIENT
Start: 2024-10-19 | End: 2024-10-19 | Stop reason: SURG

## 2024-10-19 RX ORDER — PANTOPRAZOLE SODIUM 40 MG/1
40 TABLET, DELAYED RELEASE ORAL EVERY MORNING
Status: DISCONTINUED | OUTPATIENT
Start: 2024-10-19 | End: 2024-10-21

## 2024-10-19 RX ORDER — DEXAMETHASONE SODIUM PHOSPHATE 4 MG/ML
VIAL (ML) INJECTION AS NEEDED
Status: DISCONTINUED | OUTPATIENT
Start: 2024-10-19 | End: 2024-10-19 | Stop reason: SURG

## 2024-10-19 RX ORDER — METRONIDAZOLE 500 MG/100ML
500 INJECTION, SOLUTION INTRAVENOUS EVERY 12 HOURS
Status: COMPLETED | OUTPATIENT
Start: 2024-10-19 | End: 2024-10-20

## 2024-10-19 RX ORDER — HYDROMORPHONE HYDROCHLORIDE 1 MG/ML
0.2 INJECTION, SOLUTION INTRAMUSCULAR; INTRAVENOUS; SUBCUTANEOUS EVERY 5 MIN PRN
Status: DISCONTINUED | OUTPATIENT
Start: 2024-10-19 | End: 2024-10-19 | Stop reason: HOSPADM

## 2024-10-19 RX ORDER — HYDROMORPHONE HYDROCHLORIDE 1 MG/ML
0.4 INJECTION, SOLUTION INTRAMUSCULAR; INTRAVENOUS; SUBCUTANEOUS EVERY 5 MIN PRN
Status: DISCONTINUED | OUTPATIENT
Start: 2024-10-19 | End: 2024-10-19 | Stop reason: HOSPADM

## 2024-10-19 RX ORDER — MYCOPHENOLATE MOFETIL 250 MG/1
500 CAPSULE ORAL 2 TIMES DAILY
Status: DISCONTINUED | OUTPATIENT
Start: 2024-10-19 | End: 2024-10-21

## 2024-10-19 RX ORDER — SODIUM CHLORIDE, SODIUM LACTATE, POTASSIUM CHLORIDE, CALCIUM CHLORIDE 600; 310; 30; 20 MG/100ML; MG/100ML; MG/100ML; MG/100ML
INJECTION, SOLUTION INTRAVENOUS CONTINUOUS
Status: DISCONTINUED | OUTPATIENT
Start: 2024-10-19 | End: 2024-10-19 | Stop reason: HOSPADM

## 2024-10-19 RX ORDER — MORPHINE SULFATE 4 MG/ML
4 INJECTION, SOLUTION INTRAMUSCULAR; INTRAVENOUS EVERY 10 MIN PRN
Status: DISCONTINUED | OUTPATIENT
Start: 2024-10-19 | End: 2024-10-19 | Stop reason: HOSPADM

## 2024-10-19 RX ORDER — METOPROLOL TARTRATE 1 MG/ML
2.5 INJECTION, SOLUTION INTRAVENOUS ONCE
Status: DISCONTINUED | OUTPATIENT
Start: 2024-10-19 | End: 2024-10-19 | Stop reason: HOSPADM

## 2024-10-19 RX ORDER — EPHEDRINE SULFATE 50 MG/ML
INJECTION, SOLUTION INTRAVENOUS AS NEEDED
Status: DISCONTINUED | OUTPATIENT
Start: 2024-10-19 | End: 2024-10-19 | Stop reason: SURG

## 2024-10-19 RX ORDER — LIDOCAINE HYDROCHLORIDE 10 MG/ML
INJECTION, SOLUTION EPIDURAL; INFILTRATION; INTRACAUDAL; PERINEURAL AS NEEDED
Status: DISCONTINUED | OUTPATIENT
Start: 2024-10-19 | End: 2024-10-19 | Stop reason: SURG

## 2024-10-19 RX ORDER — ROCURONIUM BROMIDE 10 MG/ML
INJECTION, SOLUTION INTRAVENOUS AS NEEDED
Status: DISCONTINUED | OUTPATIENT
Start: 2024-10-19 | End: 2024-10-19 | Stop reason: SURG

## 2024-10-19 RX ADMIN — ROCURONIUM BROMIDE 20 MG: 10 INJECTION, SOLUTION INTRAVENOUS at 09:28:00

## 2024-10-19 RX ADMIN — ONDANSETRON 4 MG: 2 INJECTION INTRAMUSCULAR; INTRAVENOUS at 09:34:00

## 2024-10-19 RX ADMIN — GLYCOPYRROLATE 0.2 MG: 0.2 INJECTION, SOLUTION INTRAMUSCULAR; INTRAVENOUS at 09:28:00

## 2024-10-19 RX ADMIN — SODIUM CHLORIDE, SODIUM LACTATE, POTASSIUM CHLORIDE, CALCIUM CHLORIDE: 600; 310; 30; 20 INJECTION, SOLUTION INTRAVENOUS at 10:41:00

## 2024-10-19 RX ADMIN — SODIUM CHLORIDE, SODIUM LACTATE, POTASSIUM CHLORIDE, CALCIUM CHLORIDE: 600; 310; 30; 20 INJECTION, SOLUTION INTRAVENOUS at 09:22:00

## 2024-10-19 RX ADMIN — EPHEDRINE SULFATE 5 MG: 50 INJECTION, SOLUTION INTRAVENOUS at 09:42:00

## 2024-10-19 RX ADMIN — LIDOCAINE HYDROCHLORIDE 25 MG: 10 INJECTION, SOLUTION EPIDURAL; INFILTRATION; INTRACAUDAL; PERINEURAL at 09:28:00

## 2024-10-19 RX ADMIN — DEXAMETHASONE SODIUM PHOSPHATE 4 MG: 4 MG/ML VIAL (ML) INJECTION at 09:32:00

## 2024-10-19 NOTE — PHYSICAL THERAPY NOTE
Chart reviewed.  RN contacted. Patient having cholecystectomy today.  Hold eval until Sun with new orders

## 2024-10-19 NOTE — PROGRESS NOTES
[facility]  part of [facility]  Hospitalist Progress Note     Sharda Rai Patient Status:  Inpatient    1932  89 year old CSN [id number]   Location [facility] PACU/[facility] PACU Attending Omega Mcdonough MD   Hosp Day # 1 PCP No primary care provider on file.     Assessment & Plan:   ----------------------------------  Acute cholecystitis.  S/p lap alaina 10/19  -Pain control  -advance diet  -Antibiotics: x 24hr more  -Surgery consult rudy  -IVF: 83  -CBC in am    DVT Mechanical Prophylaxis:   SCDs,    DVT Pharmacologic Prophylaxis   Medication   None            dispo: home upon medical clearance  If applicable, malnutrition status at bottom of note    I personally reviewed the available laboratories, imaging including. I discussed/will discuss the case with consultants. I ordered laboratories and/or radiographic studies. I adjusted medications as detailed above.  Medical decision making high, risk is high.    Subjective:   ----------------------------------  Seen in pacu. Pain controlled. No sob. somnolent      Objective:   Chief Complaint:   Chief Complaint   Patient presents with    Abdomen/Flank Pain     ----------------------------------  Temp:  [97.2 °F (36.2 °C)-98.3 °F (36.8 °C)] 97.2 °F (36.2 °C)  Pulse:  [68-98] 85  Resp:  [14-24] 14  BP: ()/(51-81) 136/63  SpO2:  [94 %-100 %] 96 %  Gen: A+Ox3.  No distress.   HEENT: NCAT, neck supple, no carotid bruit.  CV: RRR, S1S2, and intact distal pulses. No gallop, rub, murmur.  Pulm: Effort and breath sounds normal. No distress, wheezes, rales, rhonchi.  Abd: Soft, NTND, BS normal, no mass, no HSM, no rebound/guarding. Inc c/d/i  Neuro: Normal reflexes, CN. Sensory/motor exams grossly normal deficit.   MS: No joint effusions.  No peripheral edema.  Skin: Skin is warm and dry. No rashes, erythema, diaphoresis.   Psych: Normal mood and affect. Calm, cooperative    Labs:  Lab Results   Component Value Date    HGB 12.3 10/18/2024    WBC 6.7  10/18/2024    .0 10/18/2024     10/18/2024    K 3.6 10/18/2024    CREATSERUM 0.81 10/18/2024    AST 79 (H) 10/18/2024    ALT 34 10/18/2024            [Transfer Hold] mycophenolate mofetil  500 mg Oral BID    metoprolol  2.5 mg Intravenous Once    [Transfer Hold] morphINE  2 mg Intravenous Once    cefTRIAXone  2 g Intravenous Q24H    metRONIDAZOLE  500 mg Intravenous Q12H    [Transfer Hold] atorvastatin  40 mg Oral Nightly    [Transfer Hold] gabapentin  300 mg Oral Nightly       lactated ringers    atropine    naloxone    fentaNYL    fentaNYL    HYDROmorphone    HYDROmorphone    HYDROmorphone    morphINE    morphINE    morphINE PF    [Transfer Hold] ondansetron    [Transfer Hold] morphINE **OR** [Transfer Hold] morphINE    [Transfer Hold] hydrALAzine  **Certification      PHYSICIAN Certification of Need for Inpatient Hospitalization - Initial Certification    Patient will require inpatient services that will reasonably be expected to span two midnight's based on the clinical documentation in H+P.   Based on patients current state of illness, I anticipate that, after discharge, patient will require TBD.

## 2024-10-19 NOTE — H&P
[facility]  part of [facility]    History & Physical    Sharda Rai Patient Status:  Emergency    1932 MRN [id number]   Location [facility] EMERGENCY DEPARTMENT Attending Carlene Roberts MD   Hosp Day # 0 PCP No primary care provider on file.     Date:  10/18/2024  Date of Admission:  10/18/2024    Chief Complaint:  Chief Complaint   Patient presents with    Abdomen/Flank Pain       History of Present Illness:  Sharda Rai is a(n) 89 year old female, past medical history significant for hypertension hyperlipidemia presents with complaint of right upper quadrant abdominal pain that started rather abruptly prior to arrival to the ER.  Describes the pain as a soreness aggravated by food with no relieving factors.  Pain is associate with nausea with 1 episode of emesis, mainly stomach contents.  Denies any fever chills denies any change in the color of her urine or stool.  Ultrasound done in ER shows evidence of acute cholecystitis with associated cholelithiasis.    History:  Past Medical History:    Blepharitis    Dermatochalasis    Dry eye syndrome of both eyes    Macular pucker, bilateral    Pseudophakia, both eyes     Past Surgical History:   Procedure Laterality Date    Cataract extraction w/  intraocular lens implant Left 2013    Dr. Roseann frost trad     Cataract extraction w/  intraocular lens implant Right     Yag capsulotomy - od - right eye Right 04/10/2007     Family History   Problem Relation Age of Onset    Retinal detachment Father     Glaucoma Mother       reports that she has never smoked. She has never used smokeless tobacco. She reports that she does not drink alcohol and does not use drugs.    Allergies:  Allergies[1]    Home Medications:  Prior to Admission Medications   Prescriptions Last Dose Informant Patient Reported? Taking?   B Complex Vitamins (VITAMIN B COMPLEX OR)   Yes No   Sig: vitamin B complex   Cholecalciferol (VITAMIN D3) 857931 UNIT/GM Does  not apply Powder   Yes No   Sig: Vitamin D3   Metoprolol Succinate ER 50 MG Oral Tablet 24 Hr   Yes No   Sig: Take 1 tablet (50 mg total) by mouth every morning.   Mycophenolate Mofetil 500 MG Oral Tab   No No   Sig: Take 1 tablet (500 mg total) by mouth 2 (two) times daily.   PANZYGA 5 GM/50ML Intravenous Solution   Yes No   aspirin EC 81 MG Oral Tab EC   Yes No   Sig: Take 1 tablet (81 mg total) by mouth daily.   atorvastatin 40 MG Oral Tab   Yes No   gabapentin 100 MG Oral Cap   Yes No   Sig: Take 3 capsules (300 mg total) by mouth nightly.   hydrochlorothiazide 25 MG Oral Tab   Yes No   Sig: Take 1 tablet (25 mg total) by mouth daily.      Facility-Administered Medications: None       Review of Systems:  Constitutional:  Weakness, Fatigue.  Eye:  Negative.  Ear/Nose/Mouth/Throat:  Negative.  Respiratory:  Negative  Cardiovascular: Negative  Gastrointestinal: Nausea vomiting abdominal pain  Genitourinary:  Negative  Endocrine:  Negative.  Immunologic:  Negative.  Musculoskeletal:  Negative.  Integumentary:  Negative.  Neurologic:  Negative.  Psychiatric:  Negative.  ROS reviewed as documented in chart    Physical Exam:  Temp:  [97.5 °F (36.4 °C)] 97.5 °F (36.4 °C)  Pulse:  [73-83] 79  Resp:  [15-24] 15  BP: ()/(54-81) 112/64  SpO2:  [97 %-100 %] 97 %    General:  Alert and oriented.  Diffuse skin problem:  None.  Eye:  Pupils are equal, round and reactive to light, extraocular movements are intact, Normal conjunctiva.  HENT:  Normocephalic, oral mucosa is moist.  Head:  Normocephalic, atraumatic.  Neck:  Supple, non-tender, no carotid bruit, no jugular venous distention, no lymphadenopathy, no thyromegaly.  Respiratory:  Lungs are clear to auscultation, respirations are non-labored, breath sounds are equal, symmetrical chest wall expansion.  Cardiovascular:  Normal rate, regular rhythm, no murmur, no edema.  Gastrointestinal:  Soft, right upper quadrant tenderness, non-distended, normal bowel sounds, no  organomegaly.  Lymphatics:  No lymphadenopathy neck, axilla, groin.  Musculoskeletal: Normal range of motion.  normal strength.  Feet:  Normal pulses.  Neurologic:  Alert, oriented, no focal deficits, cranial nerves II-XII are grossly intact.  Cognition and Speech:  Oriented, speech clear and coherent.  Psychiatric:  Cooperative, appropriate mood & affect.      Laboratory Data:   Lab Results   Component Value Date    WBC 6.7 10/18/2024    HGB 12.3 10/18/2024    HCT 38.0 10/18/2024    .0 10/18/2024    CREATSERUM 0.81 10/18/2024    BUN 21 10/18/2024     10/18/2024    K 3.6 10/18/2024     10/18/2024    CO2 32.0 10/18/2024     10/18/2024    CA 9.1 10/18/2024    ALB 4.0 10/18/2024    ALKPHO 116 10/18/2024    BILT 0.8 10/18/2024    TP 6.4 10/18/2024    AST 79 10/18/2024    ALT 34 10/18/2024    LIP 24 10/18/2024       Imaging:  US GALLBLADDER (CPT=76705)    Result Date: 10/18/2024  CONCLUSION:  1.  2 cm lobulated cystic focus in the pancreatic head which has thin internal septations.  This is concerning for pancreatic cystic neoplasm.  There is pancreatic ductal dilatation.  Distal pancreas is obscured by overlying bowel. 2.  Gallbladder has ultrasound findings of acute cholecystitis with:  Wall thickening, cholelithiasis, and positive sonographic Ramsay's sign. 3.  Common bile duct is enlarged at 9 mm.    Dictated by (CST): Tone Saldana MD on 10/18/2024 at 7:49 PM     Finalized by (CST): Tone Saldana MD on 10/18/2024 at 7:52 PM            Assessment and Plan:    Acute cholecystitis with associated cholelithiasis  Patient will remain n.p.o. for now, surgery has been consulted.  Started on Rocephin and Flagyl, will continue same.  LFTs within normal limits, will use morphine for pain Zofran for nausea.  IV fluids initiated.    Pancreatic mass  Cystic lesion on pancreas, recent MRI done, patient claims she was told the lesions were not malignant by primary care provider    Essential  hypertension  Blood pressure well-controlled, will resume home meds once diet initiated.    Hyperlipidemia  Continue statin    Prophylaxis  SCDs, heparin on hold till patient evaluated by surgery    CODE STATUS  Full    Primary care physician  No primary care provider on file.    MDM: High, acute illness/severe exacerbation of chronic illness posing threat to life.  IV medications requiring close inpatient monitoring  60 minutes spent on this admission - examining patient, obtaining history, reviewing previous medical records, going over test results/imaging and discussing plan of care. All questions answered.     Disposition  Clinical course will dictate outcome      SHALINI SANTOS MD  10/18/2024  8:52 PM         [1]   Allergies  Allergen Reactions    Shellfish DIARRHEA

## 2024-10-19 NOTE — ED QUICK NOTES
Orders for admission, patient is aware of plan and ready to go upstairs. Any questions, please call ED RN jhoana at extension [phone].     Patient Covid vaccination status: Unvaccinated     COVID Test Ordered in ED: None    COVID Suspicion at Admission: N/A    Running Infusions:  None    Mental Status/LOC at time of transport: x4    Other pertinent information:   CIWA score: N/A   NIH score:  N/A

## 2024-10-19 NOTE — OPERATIVE REPORT
Date of operation 10/19/2024    Preoperative diagnosis:  1.  Acute cholecystitis      Operations performed:  #1 laparoscopic cholecystectomy    Postoperative diagnosis:  1.  Same    Operating surgeon:  1.  Dustin Toribio MD    Assistant:  1.Surgical Assistant.: Hay Carmona RSA       Indications:  89-year-old female who presented with acute cholecystitis.  She presents for cholecystectomy.    Details of the operation:  Patient was brought to the operating room laid supine on the operating table.  General tracheal anesthesia was induced without complications.  All pressure points were padded appropriately.  The abdomen was prepped and draped in standard center manner.  Timeout completed verify the patient's name, procedure to be performed and did not patient antibiotics.  Everybody in the room was in agreement.  A nick in the skin was made left upper quadrant a Verres needle was introduced.  Pneumoperitoneum was established.  Additional working ports were placed in a straight line across the mid abdomen, 4 x 8 mm.  Attention was directed towards the right upper quadrant.  The robot was docked per usual.  Dissection was then carried out to identify the cystic artery and cystic duct.  Critical view of safety was obtained as there were only 2 structures entering into the gallbladder and the bottom two thirds of the gallbladder was dissected.  Cystic duct and cystic artery were doubly clipped and divided.  The remainder of the attachments were taken there was electrocautery.  Hemostasis was excellent.  The gallbladder was retrieved through a bag.  The fascia was closed in a figure-of-eight manner using 0 Vicryl suture.  Hemostasis was assured after insufflating the abdomen and the ports were withdrawn.  The skin subtenons tissue irrigated and approximated subcuticular manner.    Dustin Toribio MD  Complex General Surgical Oncology  [facility]  [email or web address]

## 2024-10-19 NOTE — PLAN OF CARE
Problem: Patient Centered Care  Goal: Patient preferences are identified and integrated in the patient's plan of care  Description: Interventions:  - What would you like us to know as we care for you? I live at [facility] independent living with my   - Provide timely, complete, and accurate information to patient/family  - Incorporate patient and family knowledge, values, beliefs, and cultural backgrounds into the planning and delivery of care  - Encourage patient/family to participate in care and decision-making at the level they choose  - Honor patient and family perspectives and choices  Outcome: Progressing     Problem: Patient/Family Goals  Goal: Patient/Family Long Term Goal  Description: Patient's Long Term Goal: To return home    Interventions:  - Monitor vital signs and labs  - Surgery consult  - NPO  - See additional Care Plan goals for specific interventions  Outcome: Progressing  Goal: Patient/Family Short Term Goal  Description: Patient's Short Term Goal: To not have abdominal pain    Interventions:   - Monitor vital signs and labs  - Surgery consult  - NPO  - See additional Care Plan goals for specific interventions  Outcome: Progressing     Problem: PAIN - ADULT  Goal: Verbalizes/displays adequate comfort level or patient's stated pain goal  Description: INTERVENTIONS:  - Encourage pt to monitor pain and request assistance  - Assess pain using appropriate pain scale  - Administer analgesics based on type and severity of pain and evaluate response  - Implement non-pharmacological measures as appropriate and evaluate response  - Consider cultural and social influences on pain and pain management  - Manage/alleviate anxiety  - Utilize distraction and/or relaxation techniques  - Monitor for opioid side effects  - Notify MD/LIP if interventions unsuccessful or patient reports new pain  - Anticipate increased pain with activity and pre-medicate as appropriate  Outcome: Progressing      Problem: SAFETY ADULT - FALL  Goal: Free from fall injury  Description: INTERVENTIONS:  - Assess pt frequently for physical needs  - Identify cognitive and physical deficits and behaviors that affect risk of falls.  - Lipscomb fall precautions as indicated by assessment.  - Educate pt/family on patient safety including physical limitations  - Instruct pt to call for assistance with activity based on assessment  - Modify environment to reduce risk of injury  - Provide assistive devices as appropriate  - Consider OT/PT consult to assist with strengthening/mobility  - Encourage toileting schedule  Outcome: Progressing     Problem: DISCHARGE PLANNING  Goal: Discharge to home or other facility with appropriate resources  Description: INTERVENTIONS:  - Identify barriers to discharge w/pt and caregiver  - Include patient/family/discharge partner in discharge planning  - Arrange for needed discharge resources and transportation as appropriate  - Identify discharge learning needs (meds, wound care, etc)  - Arrange for interpreters to assist at discharge as needed  - Consider post-discharge preferences of patient/family/discharge partner  - Complete POLST form as appropriate  - Assess patient's ability to be responsible for managing their own health  - Refer to Case Management Department for coordinating discharge planning if the patient needs post-hospital services based on physician/LIP order or complex needs related to functional status, cognitive ability or social support system  Outcome: Progressing     Problem: GASTROINTESTINAL - ADULT  Goal: Minimal or absence of nausea and vomiting  Description: INTERVENTIONS:  - Maintain adequate hydration with IV or PO as ordered and tolerated  - Nasogastric tube to low intermittent suction as ordered  - Evaluate effectiveness of ordered antiemetic medications  - Provide nonpharmacologic comfort measures as appropriate  - Advance diet as tolerated, if ordered  - Obtain  nutritional consult as needed  - Evaluate fluid balance  Outcome: Progressing  Goal: Maintains or returns to baseline bowel function  Description: INTERVENTIONS:  - Assess bowel function  - Maintain adequate hydration with IV or PO as ordered and tolerated  - Evaluate effectiveness of GI medications  - Encourage mobilization and activity  - Obtain nutritional consult as needed  - Establish a toileting routine/schedule  - Consider collaborating with pharmacy to review patient's medication profile  Outcome: Progressing     Problem: METABOLIC/FLUID AND ELECTROLYTES - ADULT  Goal: Electrolytes maintained within normal limits  Description: INTERVENTIONS:  - Monitor labs and rhythm and assess patient for signs and symptoms of electrolyte imbalances  - Administer electrolyte replacement as ordered  - Monitor response to electrolyte replacements, including rhythm and repeat lab results as appropriate  - Fluid restriction as ordered  - Instruct patient on fluid and nutrition restrictions as appropriate  Outcome: Progressing  Goal: Hemodynamic stability and optimal renal function maintained  Description: INTERVENTIONS:  - Monitor labs and assess for signs and symptoms of volume excess or deficit  - Monitor intake, output and patient weight  - Monitor urine specific gravity, serum osmolarity and serum sodium as indicated or ordered  - Monitor response to interventions for patient's volume status, including labs, urine output, blood pressure (other measures as available)  - Encourage oral intake as appropriate  - Instruct patient on fluid and nutrition restrictions as appropriate  Outcome: Progressing     Problem: SKIN/TISSUE INTEGRITY - ADULT  Goal: Skin integrity remains intact  Description: INTERVENTIONS  - Assess and document risk factors for pressure ulcer development  - Assess and document skin integrity  - Monitor for areas of redness and/or skin breakdown  - Initiate interventions, skin care algorithm/standards of care  as needed  Outcome: Progressing

## 2024-10-19 NOTE — BRIEF OP NOTE
Pre-Operative Diagnosis: Acute cholecystitis [K81.0]     Post-Operative Diagnosis: Acute cholecystitis [K81.0]      Procedure Performed:   XI ROBOT-ASSISTED LAPAROSCOPIC CHOLECYSTECTOMY    Surgeons and Role:     * Dustin Toribio MD - Primary    Assistant(s):  Surgical Assistant.: Hay Carmona RSA     Surgical Findings: As expected     Specimen: Gallbladder     Estimated Blood Loss: No data recorded    Dictation Number: Not applicable    Dustin Toribio MD  10/19/2024  10:48 AM

## 2024-10-19 NOTE — ANESTHESIA PREPROCEDURE EVALUATION
Anesthesia PreOp Note    HPI:     Sharda Rai is a 89 year old female who presents for preoperative consultation requested by: Dustin Toribio MD    Date of Surgery: 10/18/2024 - 10/19/2024    Procedure(s):  XI ROBOT-ASSISTED LAPAROSCOPIC CHOLECYSTECTOMY  Indication: Acute cholecystitis [K81.0]    Relevant Problems   No relevant active problems       NPO:  Last Liquid Consumption Date: 10/18/24  Last Liquid Consumption Time: 2300  Last Solid Consumption Date: 10/18/24  Last Solid Consumption Time: 1630  Last Liquid Consumption Date: 10/18/24          History Review:  Patient Active Problem List    Diagnosis Date Noted    Acute cholecystitis 10/18/2024    Early dry stage nonexudative age-related macular degeneration of both eyes 11/05/2019    Bilateral pseudophakia 11/05/2019    Dry eye syndrome of bilateral lacrimal glands 11/05/2019    MG (myasthenia gravis) (HCC) 11/05/2019       Past Medical History:    Blepharitis    Dermatochalasis    Dry eye syndrome of both eyes    Essential hypertension    Gallstones    Hyperlipidemia    Macular pucker, bilateral    Myasthenia gravis (HCC)    Pancreatic insufficiency ([facility])    Pseudophakia, both eyes       Past Surgical History:   Procedure Laterality Date    Cataract extraction w/  intraocular lens implant Left 02/20/2013    Dr. Roseann leo     Cataract extraction w/  intraocular lens implant Right     Yag capsulotomy - od - right eye Right 04/10/2007       Prescriptions Prior to Admission[1]  Current Medications and Prescriptions Ordered in Epic[2]    Allergies[3]    Family History   Problem Relation Age of Onset    Retinal detachment Father     Glaucoma Mother      Social History     Socioeconomic History    Marital status:    Tobacco Use    Smoking status: Never    Smokeless tobacco: Never   Vaping Use    Vaping status: Never Used   Substance and Sexual Activity    Alcohol use: Never    Drug use: Never       Available pre-op labs reviewed.  Lab Results    Component Value Date    WBC 6.7 10/18/2024    RBC 4.07 10/18/2024    HGB 12.3 10/18/2024    HCT 38.0 10/18/2024    MCV 93.4 10/18/2024    MCH 30.2 10/18/2024    MCHC 32.4 10/18/2024    RDW 13.3 10/18/2024    .0 10/18/2024     Lab Results   Component Value Date     10/18/2024    K 3.6 10/18/2024     10/18/2024    CO2 32.0 10/18/2024    BUN 21 10/18/2024    CREATSERUM 0.81 10/18/2024     (H) 10/18/2024    PGLU 125 (H) 10/18/2024    CA 9.1 10/18/2024          Vital Signs:  Body mass index is 26.57 kg/m².   height is 1.549 m (5' 1\") and weight is 63.8 kg (140 lb 9.6 oz). Her temperature is 97.6 °F (36.4 °C). Her blood pressure is 135/51 and her pulse is 78. Her respiration is 16 and oxygen saturation is 96%.   Vitals:    10/18/24 2156 10/19/24 0604 10/19/24 0808 10/19/24 0901   BP: 125/64 135/56 135/62 135/51   Pulse: 89 76 68 78   Resp: 16 16 16 16   Temp: 98.3 °F (36.8 °C) 98 °F (36.7 °C) 98.1 °F (36.7 °C) 97.6 °F (36.4 °C)   TempSrc: Oral Oral Oral    SpO2: 94% 95% 96% 96%   Weight: 63.8 kg (140 lb 9.6 oz)      Height: 1.549 m (5' 1\")           Anesthesia Evaluation     Patient summary reviewed and Nursing notes reviewed    No history of anesthetic complications   Airway   Mallampati: II  Neck ROM: full  Dental      Pulmonary - negative ROS and normal exam   Cardiovascular - negative ROS and normal exam  (+) hypertension    Neuro/Psych - negative ROS   (+)  neuromuscular disease,        GI/Hepatic/Renal - negative ROS     Endo/Other - negative ROS   Abdominal  - normal exam                 Anesthesia Plan:   ASA:  3  Plan:   General  Airway:  ETT  Post-op Pain Management: IV analgesics  Plan Comments: Myenthenia gravis aware will proceed  Informed Consent Plan and Risks Discussed With:  Patient      I have informed Sharda Rai and/or legal guardian or family member of the nature of the anesthetic plan, benefits, risks including possible dental damage if relevant, major complications,  and any alternative forms of anesthetic management.   All of the patient's questions were answered to the best of my ability. The patient desires the anesthetic management as planned.  Olivier Barbosa MD  10/19/2024 9:14 AM  Present on Admission:  **None**           [1]   Medications Prior to Admission   Medication Sig Dispense Refill Last Dose/Taking    Meloxicam 15 MG Oral Tab Take 1 tablet (15 mg total) by mouth daily.   10/18/2024    pantoprazole 40 MG Oral Tab EC Take 1 tablet (40 mg total) by mouth every morning.   10/18/2024 at  9:00 AM    cholecalciferol 25 MCG (1000 UT) Oral Tab Take 1 tablet (1,000 Units total) by mouth daily.   10/18/2024    Pancrelipase, Lip-Prot-Amyl, (CREON) 08384-146464 units Oral Cap DR Particles Take 1-2 capsules by mouth 3 (three) times daily with meals. Pt reports taking 1 capsule at breakfast, 2 capsules at lunch, and 2 capsules at dinner.   10/18/2024 at  6:00 PM    loperamide (IMODIUM A-D) 2 MG Oral Cap Take 1 capsule (2 mg total) by mouth every evening.   10/17/2024 at  9:00 PM    multivitamin Oral Tab Take 1 tablet by mouth daily.   10/18/2024 at  9:00 AM    gabapentin 100 MG Oral Cap Take 3 capsules (300 mg total) by mouth nightly.   10/17/2024 at  9:00 PM    Mycophenolate Mofetil 500 MG Oral Tab Take 1 tablet (500 mg total) by mouth 2 (two) times daily. 180 tablet 3 10/18/2024 at  9:00 AM    hydrochlorothiazide 25 MG Oral Tab Take 1 tablet (25 mg total) by mouth daily.   10/18/2024 at  9:00 AM    Metoprolol Succinate ER 50 MG Oral Tablet 24 Hr Take 1 tablet (50 mg total) by mouth every morning.   10/18/2024 at  9:00 AM    atorvastatin 40 MG Oral Tab Take 1 tablet (40 mg total) by mouth nightly.   10/17/2024 at  9:00 PM    aspirin EC 81 MG Oral Tab EC Take 1 tablet (81 mg total) by mouth daily.   10/17/2024 at  9:00 PM    B Complex Vitamins (VITAMIN B COMPLEX OR) vitamin B complex   10/18/2024 at  9:00 AM   [2]   Current Facility-Administered Medications Ordered in Epic    Medication Dose Route Frequency Provider Last Rate Last Admin    [Transfer Hold] mycophenolate mofetil (Cellcept) cap 500 mg  500 mg Oral BID Sofie Nunes MD   500 mg at 10/19/24 0811    [Transfer Hold] morphINE PF 2 MG/ML injection 2 mg  2 mg Intravenous Once Carlene Roberts MD        [Transfer Hold] ondansetron (Zofran) 4 MG/2ML injection 4 mg  4 mg Intravenous Q6H PRN Ginette Bonilla MD        [Transfer Hold] morphINE PF 2 MG/ML injection 2 mg  2 mg Intravenous Q2H PRN Ginette Bonilla MD        Or    [Transfer Hold] morphINE PF 4 MG/ML injection 4 mg  4 mg Intravenous Q2H PRN Ginette Bonilla MD        [Transfer Hold] hydrALAzine (Apresoline) 20 mg/mL injection 10 mg  10 mg Intravenous Q4H PRN Ginette Bonilla MD        dextrose 5%-sodium chloride 0.45% infusion   Intravenous Continuous Anastasia Yusuf PA-C 83 mL/hr at 10/19/24 0756 Rate Change at 10/19/24 0756    cefTRIAXone (Rocephin) 2 g in sodium chloride 0.9% 100 mL IVPB-ADDV  2 g Intravenous Q24H Ginette Bonilla MD        metRONIDAZOLE in sodium chloride 0.79% (Flagyl) 5 mg/mL IVPB premix 500 mg  500 mg Intravenous Q12H Ginette Bonilla  mL/hr at 10/19/24 0813 500 mg at 10/19/24 0813    [Transfer Hold] atorvastatin (Lipitor) tab 40 mg  40 mg Oral Nightly Sofie Nunes MD   40 mg at 10/18/24 2319    [Transfer Hold] gabapentin (Neurontin) cap 300 mg  300 mg Oral Nightly Sofie Nunes MD   300 mg at 10/18/24 2318     No current Epic-ordered outpatient medications on file.   [3]   Allergies  Allergen Reactions    Shellfish DIARRHEA

## 2024-10-19 NOTE — ANESTHESIA PROCEDURE NOTES
Airway  Date/Time: 10/19/2024 9:38 AM  Urgency: Elective      General Information and Staff    Patient location during procedure: OR  Anesthesiologist: Olivier Barbosa MD  Performed: anesthesiologist   Performed by: Olivier Barbosa MD  Authorized by: Olivier Barbosa MD      Indications and Patient Condition  Indications for airway management: anesthesia  Sedation level: deep  Preoxygenated: yes  Patient position: sniffing  Mask difficulty assessment: 1 - vent by mask    Final Airway Details  Final airway type: endotracheal airway      Successful airway: ETT  Cuffed: yes   Successful intubation technique: direct laryngoscopy  Endotracheal tube insertion site: oral  Blade: Kayden  Blade size: #3  ETT size (mm): 7.0    Placement verified by: capnometry   Measured from: teeth  Number of attempts at approach: 1

## 2024-10-19 NOTE — ANESTHESIA POSTPROCEDURE EVALUATION
Patient: Sharda Rai    Procedure Summary       Date: 10/19/24 Room / Location: [facility] MAIN OR 06 / [facility] MAIN OR    Anesthesia Start: 0922 Anesthesia Stop: 1045    Procedure: XI ROBOT-ASSISTED LAPAROSCOPIC CHOLECYSTECTOMY (Abdomen) Diagnosis:       Acute cholecystitis      (Acute cholecystitis [K81.0])    Surgeons: Dustin Toribio MD Anesthesiologist: Olivier Barbosa MD    Anesthesia Type: general ASA Status: 3            Anesthesia Type: general    Vitals Value Taken Time   /66 10/19/24 1044   Temp 97.3 10/19/24 1045   Pulse 99 10/19/24 1045   Resp 16 10/19/24 1045   SpO2 98 % 10/19/24 1045   Vitals shown include unfiled device data.    [facility] AN Post Evaluation:   Patient Evaluated in PACU  Patient Participation: complete - patient participated  Level of Consciousness: awake  Pain Management: adequate  Airway Patency:patent  Dental exam unchanged from preop  Yes    Cardiovascular Status: acceptable  Respiratory Status: acceptable  Postoperative Hydration acceptable      Olivier Barbosa MD  10/19/2024 10:45 AM

## 2024-10-19 NOTE — CONSULTS
Patient Instructions by Aurora Acevedo CNP at 8/22/2019  4:30 PM     Author: Aurora Acevedo CNP Service: -- Author Type: Nurse Practitioner    Filed: 8/22/2019  5:17 PM Encounter Date: 8/22/2019 Status: Signed    : Aurora Acevedo CNP (Nurse Practitioner)       Patient Education             [facility] Parent Handout   12 Month Visit  Here are some suggestions from [facility] experts that may be of value to your family     Family Support    Try not to hit, spank, or yell at your child.    Keep rules for your child short and simple.    Use short time-outs when your child is behaving poorly.    Praise your child for good behavior.    Distract your child with something he likes during bad behavior.    Play with and read to your child often.    Make sure everyone who cares for your child gives healthy foods, avoids sweets, and uses the same rules for discipline.    Make sure places your child stays are safe.    Think about joining a toddler playgroup or taking a parenting class.    Take time for yourself and your partner.    Keep in contact with family and friends.  Establishing Routines    Your child should have at least one nap. Space it to make sure your child is tired for bed.    Make the hour before bedtime loving and calm.    Have a simple bedtime routine that includes a book.    Avoid having your child watch TV and videos, and never watch anything scary.    Be aware that fear of strangers is normal and peaks at this age.    Respect your kaye fears and have strangers approach slowly.    Avoid watching TV during family time.    Start family traditions such as reading or going for a walk together. Feeding Your Child    Have your child eat during family mealtime.    Be patient with your child as she learns to eat without help.    Encourage your child to feed herself.    Give 3 meals and 2-3 snacks spaced evenly over the day to avoid tantrums.    Make sure caregivers follow the same  [facility]  Report of Consultation    Sharda Rai Patient Status:  Inpatient    1932 MRN [id number]   Location [facility] 5SW/SE Attending Omega Mcdonough MD   Hosp Day # 1 PCP No primary care provider on file.     Requesting Physician:  Dr. Omega Mcdonough     Reason for Consultation:  Acute cholecystitis    Chief Complaint:  Abdominal pain    History of Present Illness:  Sharda Rai is a 89 year old female who presents to [facility] on 10/19/2024 with complaints of abdominal pain.  The patient states that on the evening prior to her admission she developed a sudden onset of right upper quadrant abdominal discomfort.  She stated that the pain occurred after eating dinner which consisted of a chili dog.  She reported that the pain was constant and severe.  She reported associated nausea.  She denied vomiting.  She denied fever or chills.  The patient reports that over the past several weeks that she has had mild episodes of right upper quadrant abdominal pain.  The patient denies chest pain or pressure.  She denies shortness of breath.  She denies cough or wheezing.    Upon presentation to the hospital, the patient was afebrile and hemodynamically stable.WBC 6.7 hemoglobin 12.3 platelets 247,000.  Alkaline phosphatase 116 AST 79 ALT 34 total bilirubin 0.8.  Lipase 24.  Lactic acid 1.4.  Ultrasound of the gallbladder with findings of gallbladder wall thickening, cholelithiasis and positive sonographic Ramsay sign.  There was a 2 cm lobulated cystic focus in the pancreatic head.  The patient reports that she has this pancreatic lesion for more than 10 years and has been followed as an outpatient.    Past abdominal surgical history is negative for previous abdominal surgery.    History:  Past Medical History:    Blepharitis    Dermatochalasis    Dry eye syndrome of both eyes    Essential hypertension    Gallstones    Hyperlipidemia    Macular pucker, bilateral     Myasthenia gravis (HCC)    Pancreatic insufficiency (HCC)    Pseudophakia, both eyes     Past Surgical History:   Procedure Laterality Date    Cataract extraction w/  intraocular lens implant Left 02/20/2013    Dr. Roseann leo     Cataract extraction w/  intraocular lens implant Right     Yag capsulotomy - od - right eye Right 04/10/2007     Family History   Problem Relation Age of Onset    Retinal detachment Father     Glaucoma Mother       reports that she has never smoked. She has never used smokeless tobacco. She reports that she does not drink alcohol and does not use drugs.    Allergies:  Allergies[1]    Medications:  Medications Prior to Admission   Medication Sig    Meloxicam 15 MG Oral Tab Take 1 tablet (15 mg total) by mouth daily.    pantoprazole 40 MG Oral Tab EC Take 1 tablet (40 mg total) by mouth every morning.    cholecalciferol 25 MCG (1000 UT) Oral Tab Take 1 tablet (1,000 Units total) by mouth daily.    Pancrelipase, Lip-Prot-Amyl, (CREON) 04580-802637 units Oral Cap DR Particles Take 1-2 capsules by mouth 3 (three) times daily with meals. Pt reports taking 1 capsule at breakfast, 2 capsules at lunch, and 2 capsules at dinner.    loperamide (IMODIUM A-D) 2 MG Oral Cap Take 1 capsule (2 mg total) by mouth every evening.    multivitamin Oral Tab Take 1 tablet by mouth daily.    gabapentin 100 MG Oral Cap Take 3 capsules (300 mg total) by mouth nightly.    Mycophenolate Mofetil 500 MG Oral Tab Take 1 tablet (500 mg total) by mouth 2 (two) times daily.    hydrochlorothiazide 25 MG Oral Tab Take 1 tablet (25 mg total) by mouth daily.    Metoprolol Succinate ER 50 MG Oral Tablet 24 Hr Take 1 tablet (50 mg total) by mouth every morning.    atorvastatin 40 MG Oral Tab Take 1 tablet (40 mg total) by mouth nightly.    aspirin EC 81 MG Oral Tab EC Take 1 tablet (81 mg total) by mouth daily.    B Complex Vitamins (VITAMIN B COMPLEX OR) vitamin B complex         Current Facility-Administered Medications:     ideas and routines for feeding.    Use a small plate and cup for eating and drinking.    Provide healthy foods for meals and snacks.    Let your child decide what and how much to eat.    End the feeding when the child stops eating.    Avoid small, hard foods that can cause choking--nuts, popcorn, hot dogs, grapes, and hard, raw veggies.  Safety    Have your leonid car safety seat rear-facing until your child is 2 years of age or until she reaches the highest weight or height allowed by the car safety seats .    Lock away poisons, medications, and lawn and cleaning supplies. Call Poison Help (1-822.571.3939) if your child eats nonfoods.    Keep small objects, balloons, and plastic bags away from your child.    Place lassiter at the top and bottom of stairs and guards on windows on the second floor and higher. Keep furniture away from windows.    Lock away knives and scissors.    Only leave your toddler with a mature adult.    Near or in water, keep your child close enough to touch.   Make sure to empty buckets, pools, and tubs when done.    Never have a gun in the home. If you must have a gun, store it unloaded and locked with the ammunition locked separately from the gun.  Finding a Dentist    Take your child for a first dental visit by 12 months.    Brush your leonid teeth twice each day.    With water only, use a soft toothbrush.    If using a bottle, offer only water.  What to Expect at Your Leonid 15 Month Visit  We will talk about    Your leonid speech and feelings    Getting a good nights sleep    Keeping your home safe for your child    Temper tantrums and discipline    Caring for your leonid teeth  ________________________________  Poison Help: 1-242.941.8924  Child safety seat inspection: 0-626-OFHACPGIM; seatcheck.org               mycophenolate mofetil (Cellcept) cap 500 mg, 500 mg, Oral, BID    morphINE PF 2 MG/ML injection 2 mg, 2 mg, Intravenous, Once    ondansetron (Zofran) 4 MG/2ML injection 4 mg, 4 mg, Intravenous, Q6H PRN    morphINE PF 2 MG/ML injection 2 mg, 2 mg, Intravenous, Q2H PRN **OR** morphINE PF 4 MG/ML injection 4 mg, 4 mg, Intravenous, Q2H PRN    hydrALAzine (Apresoline) 20 mg/mL injection 10 mg, 10 mg, Intravenous, Q4H PRN    dextrose 5%-sodium chloride 0.45% infusion, , Intravenous, Continuous    cefTRIAXone (Rocephin) 2 g in sodium chloride 0.9% 100 mL IVPB-ADDV, 2 g, Intravenous, Q24H    metRONIDAZOLE in sodium chloride 0.79% (Flagyl) 5 mg/mL IVPB premix 500 mg, 500 mg, Intravenous, Q12H    atorvastatin (Lipitor) tab 40 mg, 40 mg, Oral, Nightly    gabapentin (Neurontin) cap 300 mg, 300 mg, Oral, Nightly      Physical Exam:  /62 (BP Location: Right arm)   Pulse 68   Temp 98.1 °F (36.7 °C) (Oral)   Resp 16   Ht 61\"   Wt 140 lb 9.6 oz (63.8 kg)   LMP  (LMP Unknown)   SpO2 96%   BMI 26.57 kg/m²   Physical Exam  Constitutional:       General: She is not in acute distress.     Appearance: Normal appearance. She is not ill-appearing.   HENT:      Head: Normocephalic and atraumatic.   Cardiovascular:      Rate and Rhythm: Normal rate and regular rhythm.      Pulses: Normal pulses.   Pulmonary:      Effort: Pulmonary effort is normal. No respiratory distress.   Abdominal:      General: There is distension.      Palpations: Abdomen is soft.      Tenderness: There is abdominal tenderness. There is no guarding or rebound.      Comments: Abdomen soft, mildly distended, positive mild right upper quadrant tenderness to place patient.  No rebound tenderness.  No guarding.   Musculoskeletal:         General: Normal range of motion.      Cervical back: Normal range of motion.   Skin:     General: Skin is warm and dry.   Neurological:      General: No focal deficit present.      Mental Status: She is alert and oriented to  person, place, and time.   Psychiatric:         Mood and Affect: Mood normal.         Behavior: Behavior normal.         Laboratory Data:  Recent Labs   Lab 10/18/24  1841   RBC 4.07   HGB 12.3   HCT 38.0   MCV 93.4   MCH 30.2   MCHC 32.4   RDW 13.3   NEPRELIM 4.57   WBC 6.7   .0       Recent Labs   Lab 10/18/24  1841   *   BUN 21   CREATSERUM 0.81   CA 9.1   ALB 4.0      K 3.6      CO2 32.0   ALKPHO 116   AST 79*   ALT 34   BILT 0.8   TP 6.4         No results for input(s): \"PTP\", \"INR\", \"PTT\" in the last 168 hours.      US GALLBLADDER (CPT=76705)    Result Date: 10/18/2024  CONCLUSION:  1.  2 cm lobulated cystic focus in the pancreatic head which has thin internal septations.  This is concerning for pancreatic cystic neoplasm.  There is pancreatic ductal dilatation.  Distal pancreas is obscured by overlying bowel. 2.  Gallbladder has ultrasound findings of acute cholecystitis with:  Wall thickening, cholelithiasis, and positive sonographic Ramsay's sign. 3.  Common bile duct is enlarged at 9 mm.    Dictated by (CST): Tone Saldana MD on 10/18/2024 at 7:49 PM     Finalized by (CST): Tone Saldana MD on 10/18/2024 at 7:52 PM                     Medical Decision Making         Impression:  Patient Active Problem List   Diagnosis    Early dry stage nonexudative age-related macular degeneration of both eyes    Bilateral pseudophakia    Dry eye syndrome of bilateral lacrimal glands    MG (myasthenia gravis) (HCC)    Acute cholecystitis       The patient is a 89-year-old female admitted with acute cholecystitis.    Plan:  Plan for robot-assisted laparoscopic cholecystectomy with Dr. Toribio today.  N.p.o. for procedure.  Antiemetics and analgesics as needed.    Anastasia Yusuf PA-C  10/19/2024  8:56 AM                           [1]   Allergies  Allergen Reactions    Shellfish DIARRHEA

## 2024-10-19 NOTE — PLAN OF CARE
Problem: Patient Centered Care  Goal: Patient preferences are identified and integrated in the patient's plan of care  Description: Interventions:  - What would you like us to know as we care for you? I live at [facility] independent living with my   - Provide timely, complete, and accurate information to patient/family  - Incorporate patient and family knowledge, values, beliefs, and cultural backgrounds into the planning and delivery of care  - Encourage patient/family to participate in care and decision-making at the level they choose  - Honor patient and family perspectives and choices  Outcome: Progressing     Problem: Patient/Family Goals  Goal: Patient/Family Long Term Goal  Description: Patient's Long Term Goal: To return home    Interventions:  - Monitor vital signs and labs  - Surgery consult  - NPO  - See additional Care Plan goals for specific interventions  Outcome: Progressing  Goal: Patient/Family Short Term Goal  Description: Patient's Short Term Goal: To not have abdominal pain    Interventions:   - Monitor vital signs and labs  - Surgery consult  - NPO  - See additional Care Plan goals for specific interventions  Outcome: Progressing     Problem: PAIN - ADULT  Goal: Verbalizes/displays adequate comfort level or patient's stated pain goal  Description: INTERVENTIONS:  - Encourage pt to monitor pain and request assistance  - Assess pain using appropriate pain scale  - Administer analgesics based on type and severity of pain and evaluate response  - Implement non-pharmacological measures as appropriate and evaluate response  - Consider cultural and social influences on pain and pain management  - Manage/alleviate anxiety  - Utilize distraction and/or relaxation techniques  - Monitor for opioid side effects  - Notify MD/LIP if interventions unsuccessful or patient reports new pain  - Anticipate increased pain with activity and pre-medicate as appropriate  Outcome: Progressing      Problem: SAFETY ADULT - FALL  Goal: Free from fall injury  Description: INTERVENTIONS:  - Assess pt frequently for physical needs  - Identify cognitive and physical deficits and behaviors that affect risk of falls.  - [place] fall precautions as indicated by assessment.  - Educate pt/family on patient safety including physical limitations  - Instruct pt to call for assistance with activity based on assessment  - Modify environment to reduce risk of injury  - Provide assistive devices as appropriate  - Consider OT/PT consult to assist with strengthening/mobility  - Encourage toileting schedule  Outcome: Progressing     Problem: DISCHARGE PLANNING  Goal: Discharge to home or other facility with appropriate resources  Description: INTERVENTIONS:  - Identify barriers to discharge w/pt and caregiver  - Include patient/family/discharge partner in discharge planning  - Arrange for needed discharge resources and transportation as appropriate  - Identify discharge learning needs (meds, wound care, etc)  - Arrange for interpreters to assist at discharge as needed  - Consider post-discharge preferences of patient/family/discharge partner  - Complete POLST form as appropriate  - Assess patient's ability to be responsible for managing their own health  - Refer to Case Management Department for coordinating discharge planning if the patient needs post-hospital services based on physician/LIP order or complex needs related to functional status, cognitive ability or social support system  Outcome: Progressing     Problem: GASTROINTESTINAL - ADULT  Goal: Minimal or absence of nausea and vomiting  Description: INTERVENTIONS:  - Maintain adequate hydration with IV or PO as ordered and tolerated  - Nasogastric tube to low intermittent suction as ordered  - Evaluate effectiveness of ordered antiemetic medications  - Provide nonpharmacologic comfort measures as appropriate  - Advance diet as tolerated, if ordered  - Obtain  nutritional consult as needed  - Evaluate fluid balance  Outcome: Progressing  Goal: Maintains or returns to baseline bowel function  Description: INTERVENTIONS:  - Assess bowel function  - Maintain adequate hydration with IV or PO as ordered and tolerated  - Evaluate effectiveness of GI medications  - Encourage mobilization and activity  - Obtain nutritional consult as needed  - Establish a toileting routine/schedule  - Consider collaborating with pharmacy to review patient's medication profile  Outcome: Progressing     Problem: METABOLIC/FLUID AND ELECTROLYTES - ADULT  Goal: Electrolytes maintained within normal limits  Description: INTERVENTIONS:  - Monitor labs and rhythm and assess patient for signs and symptoms of electrolyte imbalances  - Administer electrolyte replacement as ordered  - Monitor response to electrolyte replacements, including rhythm and repeat lab results as appropriate  - Fluid restriction as ordered  - Instruct patient on fluid and nutrition restrictions as appropriate  Outcome: Progressing  Goal: Hemodynamic stability and optimal renal function maintained  Description: INTERVENTIONS:  - Monitor labs and assess for signs and symptoms of volume excess or deficit  - Monitor intake, output and patient weight  - Monitor urine specific gravity, serum osmolarity and serum sodium as indicated or ordered  - Monitor response to interventions for patient's volume status, including labs, urine output, blood pressure (other measures as available)  - Encourage oral intake as appropriate  - Instruct patient on fluid and nutrition restrictions as appropriate  Outcome: Progressing     Problem: SKIN/TISSUE INTEGRITY - ADULT  Goal: Skin integrity remains intact  Description: INTERVENTIONS  - Assess and document risk factors for pressure ulcer development  - Assess and document skin integrity  - Monitor for areas of redness and/or skin breakdown  - Initiate interventions, skin care algorithm/standards of care  as needed  Outcome: Progressing

## 2024-10-19 NOTE — CM/SW NOTE
10/19/24 1400   CM/SW Referral Data   Referral Source    Reason for Referral Discharge planning   Informant Spouse/Significant Other   Medical Hx   Does patient have an established PCP? No   Patient Info   Patient's Current Mental Status at Time of Assessment Alert;Oriented   Patient's Home Environment Independent Living  ([facility])   Patient lives with Spouse/Significant other   Patient Status Prior to Admission   Independent with ADLs and Mobility No   Pt. requires assistance with Driving;Meals;Finances   Services in place prior to admission DME/Supplies at home   Type of DME/Supplies Standard Walker;Wheelchair;Raised Toilet Seat;Shower Chair;Grab Bars   Discharge Needs   Anticipated D/C needs To be determined     Pt discussed during nursing rounds. Sx cholecystectomy. From [facility] [place] w/sp[place], independent w/walker at baseline. PT/OT evals requested for dc recommendation.     10/20/2024 at 1610: Anticipated therapy need: Home with Home Healthcare. HH referrals sent in [facility]IN, F2F entered. List of accepting agencies will be needed for choice.     Plan: [facility] w/spouse with HH pending agency choice and medical clearance.     / to remain available for support and/or discharge planning.     SHI Winn    [phone]

## 2024-10-19 NOTE — CONSULTS
[facility]    Patient Name:  Sharda Rai   YOB: 1932   Gender:  Female   Appt Date:  10/18/2024   Provider:  No name on file   Insurance:  MEDICARE PART A&B     PATIENT PROVIDERS  Referring Provider: No ref. provider found   Address: No referring provider defined for this encounter.   Phone #: N/A    Primary Care Provider:No primary care provider on file.   Address: [unfilled]   Phone #: None       CHIEF COMPLAINT  Chief Complaint   Patient presents with    Abdomen/Flank Pain        PROBLEMS  Reviewed   Patient Active Problem List   Diagnosis    Early dry stage nonexudative age-related macular degeneration of both eyes    Bilateral pseudophakia    Dry eye syndrome of bilateral lacrimal glands    MG (myasthenia gravis) (HCC)    Acute cholecystitis        History of Present Illness:  Asked to evaluate patient render pending our surgical management of cholecystitis.  Otherwise fairly healthy 89-year-old female who presented to the emergency department abdominal pain.  She has been experiencing right upper quadrant pain for quite some time now, exacerbated by food intake.  Workup includedAn ultrasound of the right upper quadrant which showed evidence of acute cholecystitis, wall thickening cholelithiasis and a positive sonographic Ramsay sign.  Labs remarkable for a normal white blood cell count and a normal bilirubin level of 0.8.     Vital Signs:  /62 (BP Location: Right arm)   Pulse 68   Temp 98.1 °F (36.7 °C) (Oral)   Resp 16   Ht 1.549 m (5' 1\")   Wt 63.8 kg (140 lb 9.6 oz)   LMP  (LMP Unknown)   SpO2 96%   BMI 26.57 kg/m²      Medications Reviewed:  No current outpatient medications on file.     Allergies Reviewed:  Allergies[1]     History:  Reviewed:  Past Medical History:    Blepharitis    Dermatochalasis    Dry eye syndrome of both eyes    Essential hypertension    Gallstones    Hyperlipidemia    Macular pucker, bilateral    Myasthenia  gravis (HCC)    Pancreatic insufficiency (HCC)    Pseudophakia, both eyes      Reviewed:  Past Surgical History:   Procedure Laterality Date    Cataract extraction w/  intraocular lens implant Left 02/20/2013    Dr. Roseann leo     Cataract extraction w/  intraocular lens implant Right     Yag capsulotomy - od - right eye Right 04/10/2007      Reviewed Social History:  Social History     Socioeconomic History    Marital status:    Tobacco Use    Smoking status: Never    Smokeless tobacco: Never   Vaping Use    Vaping status: Never Used   Substance and Sexual Activity    Alcohol use: Never    Drug use: Never      Reviewed:  Family History   Problem Relation Age of Onset    Retinal detachment Father     Glaucoma Mother         Review of Systems:  Review of Systems   Constitutional:  Negative for activity change, appetite change, chills, fatigue, fever and unexpected weight change.   HENT:  Negative for congestion and trouble swallowing.    Eyes:  Negative for discharge and redness.   Respiratory:  Negative for cough, chest tightness and shortness of breath.    Cardiovascular:  Negative for chest pain and leg swelling.   Gastrointestinal:  Negative for abdominal distention, abdominal pain and nausea.   Endocrine: Negative for polydipsia and polyuria.   Genitourinary:  Negative for difficulty urinating and dysuria.   Musculoskeletal:  Negative for myalgias.   Skin:  Negative for color change and pallor.   Allergic/Immunologic: Negative for immunocompromised state.   Neurological:  Negative for syncope and weakness.   Hematological:  Does not bruise/bleed easily.   Psychiatric/Behavioral:  Negative for agitation and confusion.         Physical Examination:  Physical Exam  Constitutional:       Appearance: She is well-developed.   HENT:      Head: Normocephalic.   Eyes:      Pupils: Pupils are equal, round, and reactive to light.   Cardiovascular:      Rate and Rhythm: Normal rate and regular rhythm.      Heart  sounds: No murmur heard.  Pulmonary:      Effort: Pulmonary effort is normal. No respiratory distress.      Breath sounds: Normal breath sounds. No wheezing or rales.   Abdominal:      General: There is no distension.      Palpations: Abdomen is soft.      Tenderness: There is abdominal tenderness.   Musculoskeletal:      Cervical back: Normal range of motion.   Skin:     General: Skin is warm and dry.   Neurological:      Mental Status: She is alert and oriented to person, place, and time.        Recent Labs   Lab 10/18/24  1841   RBC 4.07   HGB 12.3   HCT 38.0   MCV 93.4   MCH 30.2   MCHC 32.4   RDW 13.3   NEPRELIM 4.57   WBC 6.7   .0       Recent Labs   Lab 10/18/24  1841   *   BUN 21   CREATSERUM 0.81   EGFRCR 68   CA 9.1   ALB 4.0      K 3.6      CO2 32.0   ALKPHO 116   AST 79*   ALT 34   BILT 0.8   TP 6.4     US GALLBLADDER (CPT=76705)    Result Date: 10/18/2024  CONCLUSION:  1.  2 cm lobulated cystic focus in the pancreatic head which has thin internal septations.  This is concerning for pancreatic cystic neoplasm.  There is pancreatic ductal dilatation.  Distal pancreas is obscured by overlying bowel. 2.  Gallbladder has ultrasound findings of acute cholecystitis with:  Wall thickening, cholelithiasis, and positive sonographic Ramsay's sign. 3.  Common bile duct is enlarged at 9 mm.    Dictated by (CST): Tone Saldana MD on 10/18/2024 at 7:49 PM     Finalized by (CST): Tone Saldana MD on 10/18/2024 at 7:52 PM            Assessment / Plan:  89-year-old female presenting with acute cholecystitis  Otherwise fairly healthy, discussed alternatives/options  Recommend proceeding with cholecystectomy  Counseled about risks including not limited to bleeding, infection, bile leak  Will find time today    Dustin Toribio MD  Complex General Surgical Oncology  [facility]  [email or web address]       Follow Up:  No follow-ups on file.       Electronically Signed by: Pat  name on file         [1]   Allergies  Allergen Reactions    Shellfish DIARRHEA

## 2024-10-19 NOTE — DISCHARGE INSTRUCTIONS
Post Op Instructions    Thank you for choosing the Surgical Oncology team at [facility].  Managing Your Pain  Take your medications as directed and as needed. You may also take 1000 mg of acetaminophen (Tylenol®) every 6 hours as needed for pain.  Call our office if the medication prescribed for you doesn't ease your pain.  Don't drive or drink alcohol while you're taking prescription pain medication  Pain medication should help you resume your normal activities. Take enough medication to do your exercises comfortably. However, it's normal for your pain to increase a little as you start to be more active.  Keep track of when you take your pain medication. It works best 30 to 45 minutes after you take it. Taking it when your pain first begins is better than waiting for the pain to get worse.  Pain medication may cause constipation  Managing Constipation  Go to the bathroom at the same time every day.   Exercise. Walking is an excellent form of exercise.  Drink 8 (8-ounce) glasses (2 liters) of liquids daily, if you can. Drink water, juices (such as prune juice), soups, ice cream shakes, and other drinks that don't have caffeine.   If you haven't had a bowel movement in 3 days, call our office  Caring for Your Incision  It's normal for the skin below your incision to feel numb. This happens because some of the nerves were cut during your surgery. The numbness will go away over time.  Leave the dressing on for 48 hours after surgery. The clear outer dressing (Tegaderm) can then come off.  The sutures (stitches) in your incision are dissolvable, and will not need to be removed.   If you go home with Steri-Strips on your incision, they will loosen and fall off by themselves. If they haven't fallen off within 14 days, you can take them off.  If the area around your incision is red, puffy, or if you have any drainage from your incision, contact our office.  Showering  You may shower 48 hours after surgery. When  you shower, use mild soap to gently wash your incision. After you shower, pat the area dry with a clean towel. Don't rub over your incision. Leave your incision uncovered, unless there's drainage.  Avoid tub baths until your surgeon says it's okay.  Eating and Drinking  You may resume a general diet when you go home. You may not be able to eat as much as you did before your surgery. Try to eat 4 to 6 small meals a day.  Drink plenty of liquids. Try to drink 8 (8-ounce) glasses of liquids every day. Don't drink alcohol until you check with your surgeon.  Activity and Exercise  Remember, recovery after surgery takes several weeks. It is common to feel tired or fatigued. Rest as needed.   Doing aerobic exercise, such as walking and stair climbing, will help you gain strength and feel better. Gradually increase the distance you walk. Rest or stop as needed.  Don't lift anything heavier than 10 pounds for at least 8 weeks after your surgery or until your surgeon says it's okay.   Avoid strenuous activity and exercises until your surgeon says it's okay.  Ask your surgeon when it is okay for you to drive.   Ask your surgeon when you can return to work.  When to Call:  Let us know if you experience the following symptoms:  Fever of 100.4° F (38°C) or higher  Cloudy or smelly drainage from the incision site  The skin around your incision is warm/red/swollen  Sudden increase in pain or new pain  Shaking chills  Fast pulse  Shortness of breath or chest pain  Nausea or vomiting.   Diarrhea  Constipation that isn't relieved in 3 days  Signs of a bladder infection (urinating more often than usual, burning while urinating, bleeding or hesitancy while urinating).  Any new or unexplained symptoms    Our office will contact you for a follow up appointment.     Please arrive at the following location:  [place]: Ashley Kang [address] [place] [id number]  [facility]: [address]  [place] [address]  Please call us at [phone] if you are unable to make it to your appointment or if you have any questions.

## 2024-10-20 PROBLEM — B96.20 E COLI BACTEREMIA: Status: ACTIVE | Noted: 2024-10-20

## 2024-10-20 PROBLEM — R78.81 E COLI BACTEREMIA: Status: ACTIVE | Noted: 2024-10-20

## 2024-10-20 LAB
ATRIAL RATE: 77 BPM
P AXIS: 58 DEGREES
P-R INTERVAL: 142 MS
Q-T INTERVAL: 396 MS
QRS DURATION: 82 MS
QTC CALCULATION (BEZET): 448 MS
R AXIS: 45 DEGREES
T AXIS: 54 DEGREES
VENTRICULAR RATE: 77 BPM

## 2024-10-20 PROCEDURE — 99233 SBSQ HOSP IP/OBS HIGH 50: CPT | Performed by: HOSPITALIST

## 2024-10-20 RX ORDER — HYDROCODONE BITARTRATE AND ACETAMINOPHEN 5; 325 MG/1; MG/1
1 TABLET ORAL EVERY 6 HOURS PRN
Qty: 10 TABLET | Refills: 0 | Status: SHIPPED | OUTPATIENT
Start: 2024-10-20 | End: 2024-10-21

## 2024-10-20 NOTE — SPIRITUAL CARE NOTE
Spiritual Care Visit Note    Patient Name: Sharda Rai Date of Spiritual Care Visit: 10/20/24   : 1932 Primary Dx: Acute cholecystitis       Referred By: Referral From: Nurse;    Spiritual Care Taxonomy:    Intended Effects: Aligning care plan with patient's values    Methods: Assist with spiritual/Sabianism practices;Offer spiritual/Sabianism support    Interventions: Acknowledge current situation;Silent prayer;Provide hospitality    Visit Type/Summary:     - Spiritual Care: Responded to a request for spiritual care and assessed the patient for spiritual care needs. Responded to a request via the on call phone Consulted with RN prior to visit. Patient received holy communion from the [facility].  remains available as needed for follow up.    Spiritual Care support can be requested via an Epic consult. For urgent/immediate needs, please contact the On Call  at: [place]: ext [phone]    Bautista SCHILLING  Chaplain Resident  [id number]

## 2024-10-20 NOTE — PROGRESS NOTES
[facility]  Progress Note    Sharda Rai Patient Status:  Inpatient    1932 MRN [id number]   Location [facility] 5SW/SE Attending Omega Mcdonough MD   Hosp Day # 2 PCP No primary care provider on file.     Subjective:  The patient is sitting up in chair today.  She states that she is feeling well today.  She denies abdominal pain.  She denies nausea or vomiting.  She is tolerating diet well.  She denies fever or chills.    Objective/Physical Exam:  General: Alert, orientated x3.  Cooperative.  No apparent distress.  Vital Signs:  Blood pressure 116/56, pulse 84, temperature 98.2 °F (36.8 °C), temperature source Oral, resp. rate 18, height 61\", weight 140 lb 9.6 oz (63.8 kg), SpO2 95%.  Wt Readings from Last 3 Encounters:   10/18/24 140 lb 9.6 oz (63.8 kg)     Lungs: No respiratory distress.  Cardiac: Regular rate and rhythm.   Abdomen:  Soft, non distended, non tender, with no rebound or guarding.  No peritoneal signs.   Extremities:  No lower extremity edema noted.    Incision: Clean, dry, intact, no erythema      Intake/Output:    Intake/Output Summary (Last 24 hours) at 10/20/2024 0925  Last data filed at 10/20/2024 0650  Gross per 24 hour   Intake 1544 ml   Output 1450 ml   Net 94 ml     I/O last 3 completed shifts:  In: 4444 [P.O.:280; I.V.:3964; IV PIGGYBACK:200]  Out: 1650 [Urine:1650]  No intake/output data recorded.    Medications:    mycophenolate mofetil  500 mg Oral BID    lipase-protease-amylase (Lip-Prot-Amyl)  10,000 Units Oral TID CC    pantoprazole  40 mg Oral QAM    meropenem  500 mg Intravenous Q12H    morphINE  2 mg Intravenous Once    atorvastatin  40 mg Oral Nightly    gabapentin  300 mg Oral Nightly       Labs:        No results found for: \"PT\", \"INR\"      Assessment  Patient Active Problem List   Diagnosis    Early dry stage nonexudative age-related macular degeneration of both eyes    Bilateral pseudophakia    Dry eye syndrome of bilateral lacrimal glands     MG (myasthenia gravis) (HCC)    Acute cholecystitis       Postop day 1 robot-assisted laparoscopic cholecystectomy      Plan:  Continue diet as tolerated.  Continue antibiotics as per attending recommendations, blood cultures positive for E. coli today.  Ambulate and up to chair  The patient is stable for discharge from surgical standpoint once cleared by other specialties.  Follow-up with surgery in 2 weeks, sooner if needed.    Quality:  DVT Mechanical Prophylaxis:   SCDs,    DVT Pharmacologic Prophylaxis   Medication   None                Code Status: Not on file  Guevara: External urinary catheter in place  Guevara Duration (in days):   Central line:    REUBEN:         Anastasia Yusuf PA-C  10/20/2024  9:25 AM    Doing well postoperatively  May discharge from my standpoint  Continue antibiotics per internal medicine, positive cultures on arrival    Dustin Toribio MD  Complex General Surgical Oncology  [facility]  [email or web address]

## 2024-10-20 NOTE — PROGRESS NOTES
[facility] Dosing Service  Antibiotic Dosing    Sharda Rai is a 89 year old for whom pharmacy is dosing meropenem for treatment of  bacteremia.  Allergies: is allergic to shellfish.    Vitals: /69 (BP Location: Right arm)   Pulse 77   Temp 98 °F (36.7 °C) (Oral)   Resp 20   Ht 1.549 m (5' 1\")   Wt 63.8 kg (140 lb 9.6 oz)   LMP  (LMP Unknown)   SpO2 98%   BMI 26.57 kg/m²     Labs:   WBC   Date Value Ref Range Status   10/18/2024 6.7 4.0 - 11.0 x10(3) uL Final     Creatinine   Date Value Ref Range Status   10/18/2024 0.81 0.55 - 1.02 mg/dL Final     BUN   Date Value Ref Range Status   10/18/2024 21 9 - 23 mg/dL Final       CrCl: estimated creatinine clearance is 34.1 mL/min (based on SCr of 0.81 mg/dL).    Cultures: Blood PCR + E. Coli, cultures pending    Assessment/Plan: Meropenem 500mg IV q12hrs    Pharmacy will continue to follow.  We appreciate the opportunity to assist in the care of this patient.    Thank you,   Sally Dhillon, PharmD  10/19/2024  7:24 PM

## 2024-10-20 NOTE — PLAN OF CARE
Problem: Patient Centered Care  Goal: Patient preferences are identified and integrated in the patient's plan of care  Description: Interventions:  - What would you like us to know as we care for you? [facility]   - Provide timely, complete, and accurate information to patient/family  - Incorporate patient and family knowledge, values, beliefs, and cultural backgrounds into the planning and delivery of care  - Encourage patient/family to participate in care and decision-making at the level they choose  - Honor patient and family perspectives and choices  Outcome: Progressing     Problem: Patient/Family Goals  Goal: Patient/Family Long Term Goal  Description: Patient's Long Term Goal: To return home    Interventions:  - Monitor vital signs and labs  - Surgery consult  - NPO  - See additional Care Plan goals for specific interventions  Outcome: Progressing  Goal: Patient/Family Short Term Goal  Description: Patient's Short Term Goal: To not have abdominal pain    Interventions:   - Monitor vital signs and labs  - Surgery consult  - NPO  - See additional Care Plan goals for specific interventions  Outcome: Progressing     Problem: PAIN - ADULT  Goal: Verbalizes/displays adequate comfort level or patient's stated pain goal  Description: INTERVENTIONS:  - Encourage pt to monitor pain and request assistance  - Assess pain using appropriate pain scale  - Administer analgesics based on type and severity of pain and evaluate response  - Implement non-pharmacological measures as appropriate and evaluate response  - Consider cultural and social influences on pain and pain management  - Manage/alleviate anxiety  - Utilize distraction and/or relaxation techniques  - Monitor for opioid side effects  - Notify MD/LIP if interventions unsuccessful or patient reports new pain  - Anticipate increased pain with activity and pre-medicate as appropriate  Outcome: Progressing      Problem: SAFETY ADULT - FALL  Goal: Free from fall injury  Description: INTERVENTIONS:  - Assess pt frequently for physical needs  - Identify cognitive and physical deficits and behaviors that affect risk of falls.  - [place] fall precautions as indicated by assessment.  - Educate pt/family on patient safety including physical limitations  - Instruct pt to call for assistance with activity based on assessment  - Modify environment to reduce risk of injury  - Provide assistive devices as appropriate  - Consider OT/PT consult to assist with strengthening/mobility  - Encourage toileting schedule  Outcome: Progressing     Problem: DISCHARGE PLANNING  Goal: Discharge to home or other facility with appropriate resources  Description: INTERVENTIONS:  - Identify barriers to discharge w/pt and caregiver  - Include patient/family/discharge partner in discharge planning  - Arrange for needed discharge resources and transportation as appropriate  - Identify discharge learning needs (meds, wound care, etc)  - Arrange for interpreters to assist at discharge as needed  - Consider post-discharge preferences of patient/family/discharge partner  - Complete POLST form as appropriate  - Assess patient's ability to be responsible for managing their own health  - Refer to Case Management Department for coordinating discharge planning if the patient needs post-hospital services based on physician/LIP order or complex needs related to functional status, cognitive ability or social support system  Outcome: Progressing     Problem: GASTROINTESTINAL - ADULT  Goal: Minimal or absence of nausea and vomiting  Description: INTERVENTIONS:  - Maintain adequate hydration with IV or PO as ordered and tolerated  - Nasogastric tube to low intermittent suction as ordered  - Evaluate effectiveness of ordered antiemetic medications  - Provide nonpharmacologic comfort measures as appropriate  - Advance diet as tolerated, if ordered  - Obtain  nutritional consult as needed  - Evaluate fluid balance  Outcome: Progressing  Goal: Maintains or returns to baseline bowel function  Description: INTERVENTIONS:  - Assess bowel function  - Maintain adequate hydration with IV or PO as ordered and tolerated  - Evaluate effectiveness of GI medications  - Encourage mobilization and activity  - Obtain nutritional consult as needed  - Establish a toileting routine/schedule  - Consider collaborating with pharmacy to review patient's medication profile  Outcome: Progressing     Problem: METABOLIC/FLUID AND ELECTROLYTES - ADULT  Goal: Electrolytes maintained within normal limits  Description: INTERVENTIONS:  - Monitor labs and rhythm and assess patient for signs and symptoms of electrolyte imbalances  - Administer electrolyte replacement as ordered  - Monitor response to electrolyte replacements, including rhythm and repeat lab results as appropriate  - Fluid restriction as ordered  - Instruct patient on fluid and nutrition restrictions as appropriate  Outcome: Progressing  Goal: Hemodynamic stability and optimal renal function maintained  Description: INTERVENTIONS:  - Monitor labs and assess for signs and symptoms of volume excess or deficit  - Monitor intake, output and patient weight  - Monitor urine specific gravity, serum osmolarity and serum sodium as indicated or ordered  - Monitor response to interventions for patient's volume status, including labs, urine output, blood pressure (other measures as available)  - Encourage oral intake as appropriate  - Instruct patient on fluid and nutrition restrictions as appropriate  Outcome: Progressing     Problem: SKIN/TISSUE INTEGRITY - ADULT  Goal: Skin integrity remains intact  Description: INTERVENTIONS  - Assess and document risk factors for pressure ulcer development  - Assess and document skin integrity  - Monitor for areas of redness and/or skin breakdown  - Initiate interventions, skin care algorithm/standards of care  as needed  Outcome: Progressing

## 2024-10-20 NOTE — PHYSICAL THERAPY NOTE
PHYSICAL THERAPY EVALUATION - INPATIENT     Room Number: 559/559-A  Evaluation Date: 10/20/2024  Type of Evaluation: Initial   Physician Order: PT Eval and Treat    Presenting Problem: acute cholecystitis, s/p lap alaina  Co-Morbidities : HTN, HLD, myasthenia gravis  Reason for Therapy: Mobility Dysfunction and Discharge Planning    PHYSICAL THERAPY ASSESSMENT   Patient is a 89 year old female admitted 10/18/2024 s/p laparoscopic cholecystectomy. Rn cleared pt to participate in PT evaluation this afternoon. Pt received in bedside chair, agreeable to participate.     Prior to admission, patient's baseline is mod I with 4WW.  Patient is currently functioning near baseline with bed mobility, transfers, and gait.  Patient is requiring contact guard assist as a result of the following impairments: decreased functional strength, decreased endurance/aerobic capacity, and decreased muscular endurance.  Physical Therapy will continue to follow for duration of hospitalization.    Patient will benefit from continued skilled PT Services at discharge to promote prior level of function.  Anticipate patient will return home with home health PT.    PLAN DURING HOSPITALIZATION  Nursing Mobility Recommendation : 1 Assist  PT Device Recommendation: Rollator  PT Treatment Plan: Bed mobility;Endurance;Energy conservation;Patient education;Gait training;Neuromuscular re-educate;Range of motion;Strengthening;Transfer training;Balance training  Rehab Potential : Good  Frequency (Obs): 5x/week     PHYSICAL THERAPY MEDICAL/SOCIAL HISTORY   History related to current admission:      Problem List  Principal Problem:    Acute cholecystitis  Active Problems:    E coli bacteremia      HOME SITUATION  Type of Home: Independent living facility  Home Layout: One level;Elevator                     Lives With: Spouse    Drives: No         Prior Level of [place]: lives in ILF apt with spouse, mod I with 4WW, active  within community, spouse  drives    SUBJECTIVE  \"I can walk if you really need me to.\"     PHYSICAL THERAPY EXAMINATION   OBJECTIVE  Precautions: Bed/chair alarm  Fall Risk: Standard fall risk    WEIGHT BEARING RESTRICTION       PAIN ASSESSMENT  Rating: Unable to rate  Location: incision  Management Techniques: Activity promotion    COGNITION  Overall Cognitive Status:  WFL - within functional limits    RANGE OF MOTION AND STRENGTH ASSESSMENT  Upper extremity ROM and strength are within functional limits   Lower extremity ROM is within functional limits   Lower extremity strength is within functional limits     BALANCE  Static Sitting: Good  Dynamic Sitting: Fair +  Static Standing: Fair +  Dynamic Standing: Fair -      AM-PAC '6-Clicks' INPATIENT SHORT FORM - BASIC MOBILITY  How much difficulty does the patient currently have...  Patient Difficulty: Turning over in bed (including adjusting bedclothes, sheets and blankets)?: A Little   Patient Difficulty: Sitting down on and standing up from a chair with arms (e.g., wheelchair, bedside commode, etc.): A Little   Patient Difficulty: Moving from lying on back to sitting on the side of the bed?: A Little   How much help from another person does the patient currently need...   Help from Another: Moving to and from a bed to a chair (including a wheelchair)?: A Little   Help from Another: Need to walk in hospital room?: A Little   Help from Another: Climbing 3-5 steps with a railing?: A Little     AM-PAC Score:  Raw Score: 18   Approx Degree of Impairment: 46.58%   Standardized Score (AM-PAC Scale): 43.63   CMS Modifier (G-Code): CK    FUNCTIONAL ABILITY STATUS  Functional Mobility/Gait Assessment  Gait Assistance: Contact guard assist  Distance (ft): 40 ft  Assistive Device: Rolling walker  Pattern: Shuffle (stooped posture)  Sit to Stand: contact guard assist    Exercise/Education Provided:  Bed mobility  Body mechanics  Energy conservation  Functional activity tolerated  Gait  training  ROM  Strengthening  Transfer training    Skilled Therapy Provided: Pt agreeable to participate. Pt received in bedside chair at start. Reports mild incisional pain and general fatigue from infection. Gait belt donned. Pt performed transfers from chair with CGA and increased time. Pt ambulated 40 ft with RW and CGA as well. Remained in room, declined to go to hallway due to concern for having to use the bathroom. Pt with significant thoracic kyphosis, restricted trunk mobility. Pt returned to bedside chair at end. Encouraged seated LE AROM/strengthening activities. Pt receptive and demonstrated exercises. Remained up in chair at end of session, needs left in reach and RN aware of session outcome.     The patient's Approx Degree of Impairment: 46.58% has been calculated based on documentation in the [facility] '6 clicks' Inpatient Basic Mobility Short Form.  Research supports that patients with this level of impairment may benefit from HHPT.    Final disposition will be made by interdisciplinary medical team.    Patient End of Session: Up in chair;Needs met;Call light within reach;RN aware of session/findings;All patient questions and concerns addressed;Hospital anti-slip socks    CURRENT GOALS  Goals to be met by: 11/5  Patient Goal Patient's self-stated goal is: To go home.    Goal #1 Patient is able to demonstrate supine - sit EOB @ level: independent     Goal #1   Current Status    Goal #2 Patient is able to demonstrate transfers EOB to/from Chair/Wheelchair at assistance level: modified independent with walker - rolling     Goal #2  Current Status    Goal #3 Patient is able to ambulate 200 feet with assist device: walker - rolling at assistance level: modified independent   Goal #3   Current Status            Goal #5 Patient to demonstrate independence with home activity/exercise instructions provided to patient in preparation for discharge.   Goal #5   Current Status    Goal #6    Goal #6  Current Status       Patient Evaluation Complexity Level:  History Low - no personal factors and/or co-morbidities   Examination of body systems Low -  addressing 1-2 elements   Clinical Presentation Low- Stable   Clinical Decision Making  Low Complexity     Gait Training: 15 minutes

## 2024-10-20 NOTE — PROGRESS NOTES
[facility]  part of [facility]  Hospitalist Progress Note     Sharda Rai Patient Status:  Inpatient    1932  89 year old CSN [id number]   [facility]ending Omega Mcdonough MD   Hosp Day # 2 PCP No primary care provider on file.     Assessment & Plan:   ----------------------------------  Acute cholecystitis.  S/p lap alaina 10/19  -Pain control  -advance diet  -Antibiotics: Meropenem  -Surgery consult rudy  -IVF: DC  -CBC in am    E. coli bacteremia.  Presumably from gallbladder source.  Patient doing amazingly well clinically with normal vitals, increased energy and sense of wellbeing.  She is not septic.  -Monitor cultures for sensitivities  -Continue meropenem for now de-escalate as possible    Other problems  Myasthenia gravis, will need to be judicious about new medications    DVT Mechanical Prophylaxis:   SCDs,    DVT Pharmacologic Prophylaxis   Medication   None            dispo: home upon medical clearance  If applicable, malnutrition status at bottom of note    I personally reviewed the available laboratories, imaging including. I discussed/will discuss the case with consultants. I ordered laboratories and/or radiographic studies. I adjusted medications as detailed above.  Medical decision making high, risk is high.    Subjective:   ----------------------------------  Seen in pacu. Pain controlled. No sob. somnolent      Objective:   Chief Complaint:   Chief Complaint   Patient presents with    Abdomen/Flank Pain     ----------------------------------  Temp:  [98 °F (36.7 °C)-98.6 °F (37 °C)] 98.2 °F (36.8 °C)  Pulse:  [77-93] 84  Resp:  [18-20] 18  BP: (116-144)/(56-69) 116/56  SpO2:  [95 %-98 %] 95 %  Gen: A+Ox3.  No distress.   HEENT: NCAT, neck supple, no carotid bruit.  CV: RRR, S1S2, and intact distal pulses. No gallop, rub, murmur.  Pulm: Effort and breath sounds normal. No distress, wheezes, rales, rhonchi.  Abd: Soft, incisions clean dry and intact,  appropriately tender.  Neuro: Normal reflexes, CN. Sensory/motor exams grossly normal deficit.   MS: No joint effusions.  No peripheral edema.  Skin: Skin is warm and dry. No rashes, erythema, diaphoresis.   Psych: Normal mood and affect. Calm, cooperative    Labs:  Lab Results   Component Value Date    HGB 12.3 10/18/2024    WBC 6.7 10/18/2024    .0 10/18/2024     10/18/2024    K 3.6 10/18/2024    CREATSERUM 0.81 10/18/2024    AST 79 (H) 10/18/2024    ALT 34 10/18/2024            mycophenolate mofetil  500 mg Oral BID    lipase-protease-amylase (Lip-Prot-Amyl)  10,000 Units Oral TID CC    pantoprazole  40 mg Oral QAM    meropenem  500 mg Intravenous Q12H    morphINE  2 mg Intravenous Once    atorvastatin  40 mg Oral Nightly    gabapentin  300 mg Oral Nightly       acetaminophen    HYDROcodone-acetaminophen    ondansetron    morphINE **OR** [DISCONTINUED] morphINE    hydrALAzine  **Certification      PHYSICIAN Certification of Need for Inpatient Hospitalization - Initial Certification    Patient will require inpatient services that will reasonably be expected to span two midnight's based on the clinical documentation in H+P.   Based on patients current state of illness, I anticipate that, after discharge, patient will require TBD.

## 2024-10-21 VITALS
RESPIRATION RATE: 18 BRPM | SYSTOLIC BLOOD PRESSURE: 116 MMHG | TEMPERATURE: 98 F | WEIGHT: 140.63 LBS | OXYGEN SATURATION: 93 % | DIASTOLIC BLOOD PRESSURE: 98 MMHG | HEART RATE: 112 BPM | HEIGHT: 61 IN | BODY MASS INDEX: 26.55 KG/M2

## 2024-10-21 LAB
ANION GAP SERPL CALC-SCNC: 5 MMOL/L (ref 0–18)
BASOPHILS # BLD AUTO: 0.04 X10(3) UL (ref 0–0.2)
BASOPHILS NFR BLD AUTO: 0.5 %
BUN BLD-MCNC: 13 MG/DL (ref 9–23)
BUN/CREAT SERPL: 22 (ref 10–20)
CALCIUM BLD-MCNC: 8.9 MG/DL (ref 8.7–10.4)
CHLORIDE SERPL-SCNC: 106 MMOL/L (ref 98–112)
CO2 SERPL-SCNC: 29 MMOL/L (ref 21–32)
CREAT BLD-MCNC: 0.59 MG/DL
DEPRECATED RDW RBC AUTO: 45.5 FL (ref 35.1–46.3)
EGFRCR SERPLBLD CKD-EPI 2021: 85 ML/MIN/1.73M2 (ref 60–?)
EOSINOPHIL # BLD AUTO: 0.26 X10(3) UL (ref 0–0.7)
EOSINOPHIL NFR BLD AUTO: 3.3 %
ERYTHROCYTE [DISTWIDTH] IN BLOOD BY AUTOMATED COUNT: 13.5 % (ref 11–15)
GLUCOSE BLD-MCNC: 103 MG/DL (ref 70–99)
HCT VFR BLD AUTO: 34.4 %
HGB BLD-MCNC: 11.7 G/DL
IMM GRANULOCYTES # BLD AUTO: 0.03 X10(3) UL (ref 0–1)
IMM GRANULOCYTES NFR BLD: 0.4 %
LYMPHOCYTES # BLD AUTO: 1.25 X10(3) UL (ref 1–4)
LYMPHOCYTES NFR BLD AUTO: 15.8 %
MCH RBC QN AUTO: 31.5 PG (ref 26–34)
MCHC RBC AUTO-ENTMCNC: 34 G/DL (ref 31–37)
MCV RBC AUTO: 92.7 FL
MONOCYTES # BLD AUTO: 0.88 X10(3) UL (ref 0.1–1)
MONOCYTES NFR BLD AUTO: 11.1 %
NEUTROPHILS # BLD AUTO: 5.46 X10 (3) UL (ref 1.5–7.7)
NEUTROPHILS # BLD AUTO: 5.46 X10(3) UL (ref 1.5–7.7)
NEUTROPHILS NFR BLD AUTO: 68.9 %
OSMOLALITY SERPL CALC.SUM OF ELEC: 290 MOSM/KG (ref 275–295)
PLATELET # BLD AUTO: 215 10(3)UL (ref 150–450)
POTASSIUM SERPL-SCNC: 3.5 MMOL/L (ref 3.5–5.1)
RBC # BLD AUTO: 3.71 X10(6)UL
SODIUM SERPL-SCNC: 140 MMOL/L (ref 136–145)
WBC # BLD AUTO: 7.9 X10(3) UL (ref 4–11)

## 2024-10-21 PROCEDURE — 99239 HOSP IP/OBS DSCHRG MGMT >30: CPT | Performed by: HOSPITALIST

## 2024-10-21 RX ORDER — CEPHALEXIN 500 MG/1
500 CAPSULE ORAL 4 TIMES DAILY
Qty: 48 CAPSULE | Refills: 0 | Status: SHIPPED | OUTPATIENT
Start: 2024-10-21 | End: 2024-11-02

## 2024-10-21 RX ORDER — ACETAMINOPHEN 325 MG/1
650 TABLET ORAL EVERY 6 HOURS PRN
Status: SHIPPED | COMMUNITY
Start: 2024-10-21

## 2024-10-21 NOTE — CM/SW NOTE
Pt discussed in RN DC rounds. Sw met with patient and spouse at bedside. SW provided patient HH list. Pt informed SW that she feels that she does not need HH. Her  provides whatever assistance she needs.        10/21/24 1100   Discharge disposition   Expected discharge disposition Home or Self   Post Acute Care Provider   ([facility])   Patient Declines Recommended Services Yes  (Home Health)   Discharge transportation Private car     Pt received MDO for discharge. Patient continues to decline HH at this time.     Plan:  DC to [facility].     EFFIE/GIFTY to remain available for support and/or discharge planning.     Dana Perez, MSW, LSW   x [phone]

## 2024-10-21 NOTE — PROGRESS NOTES
[facility]  Progress Note    Sharda Rai Patient Status:  Inpatient    1932 MRN [id number]   Location [facility] 5SW/SE Attending Omega Mcdonough MD   Hosp Day # 3 PCP No primary care provider on file.     Subjective:  The patient is sitting up in chair today.  She states that she is feeling well today.  She denies abdominal pain.  She denies nausea or vomiting.  She is tolerating diet well.  She denies fever or chills.    Objective/Physical Exam:  General: Alert, orientated x3.  Cooperative.  No apparent distress.  Vital Signs:  Blood pressure (!) 116/98, pulse 112, temperature 98.4 °F (36.9 °C), temperature source Oral, resp. rate 18, height 5' 1\" (1.549 m), weight 140 lb 9.6 oz (63.8 kg), SpO2 93%.  Wt Readings from Last 3 Encounters:   10/18/24 140 lb 9.6 oz (63.8 kg)     Lungs: No respiratory distress.  Cardiac: Regular rate and rhythm.   Abdomen:  Soft, non distended, non tender, with no rebound or guarding.  No peritoneal signs.   Extremities:  No lower extremity edema noted.    Incision: Clean, dry, intact, no erythema      Intake/Output:  No intake or output data in the 24 hours ending 10/21/24 1046    I/O last 3 completed shifts:  In: 664 [I.V.:664]  Out: 350 [Urine:350]  No intake/output data recorded.    Medications:    mycophenolate mofetil  500 mg Oral BID    lipase-protease-amylase (Lip-Prot-Amyl)  10,000 Units Oral TID CC    pantoprazole  40 mg Oral QAM    meropenem  500 mg Intravenous Q12H    morphINE  2 mg Intravenous Once    atorvastatin  40 mg Oral Nightly    gabapentin  300 mg Oral Nightly       Labs:  Lab Results   Component Value Date    WBC 7.9 10/21/2024    HGB 11.7 10/21/2024    HCT 34.4 10/21/2024    .0 10/21/2024     Lab Results   Component Value Date     10/21/2024    K 3.5 10/21/2024     10/21/2024    CO2 29.0 10/21/2024    BUN 13 10/21/2024    CREATSERUM 0.59 10/21/2024     10/21/2024    CA 8.9 10/21/2024     No results found  for: \"PT\", \"INR\"      Assessment  Patient Active Problem List   Diagnosis    Early dry stage nonexudative age-related macular degeneration of both eyes    Bilateral pseudophakia    Dry eye syndrome of bilateral lacrimal glands    MG (myasthenia gravis) (HCC)    Acute cholecystitis    E coli bacteremia       Postop day 2 robot-assisted laparoscopic cholecystectomy  Bacteremia     Plan:  Continue diet as tolerated.  Continue IV antibiotics as per hospitalist  The patient is stable for discharge from surgical standpoint once cleared by other specialties.  Follow-up with DILIP Heredia in 2 weeks, sooner if needed.    Quality:  DVT Mechanical Prophylaxis:   SCDs,    DVT Pharmacologic Prophylaxis   Medication   None                Code Status: Not on file  Guevara: External urinary catheter in place  Guevara Duration (in days):   Central line:    REUBEN: 10/22/2024        Jeanne Howard PA-C  10/21/2024  10:45 AM

## 2024-10-21 NOTE — PLAN OF CARE
Problem: Patient Centered Care  Goal: Patient preferences are identified and integrated in the patient's plan of care  Description: Interventions:  - What would you like us to know as we care for you? I live at [facility] independent living with my   - Provide timely, complete, and accurate information to patient/family  - Incorporate patient and family knowledge, values, beliefs, and cultural backgrounds into the planning and delivery of care  - Encourage patient/family to participate in care and decision-making at the level they choose  - Honor patient and family perspectives and choices  Outcome: Progressing     Problem: Patient/Family Goals  Goal: Patient/Family Long Term Goal  Description: Patient's Long Term Goal: To return home    Interventions:  - Monitor vital signs and labs  - Surgery consult  - NPO  - See additional Care Plan goals for specific interventions  Outcome: Progressing  Goal: Patient/Family Short Term Goal  Description: Patient's Short Term Goal: To not have abdominal pain    Interventions:   - Monitor vital signs and labs  - Surgery consult  - NPO  - See additional Care Plan goals for specific interventions  Outcome: Progressing     Problem: PAIN - ADULT  Goal: Verbalizes/displays adequate comfort level or patient's stated pain goal  Description: INTERVENTIONS:  - Encourage pt to monitor pain and request assistance  - Assess pain using appropriate pain scale  - Administer analgesics based on type and severity of pain and evaluate response  - Implement non-pharmacological measures as appropriate and evaluate response  - Consider cultural and social influences on pain and pain management  - Manage/alleviate anxiety  - Utilize distraction and/or relaxation techniques  - Monitor for opioid side effects  - Notify MD/LIP if interventions unsuccessful or patient reports new pain  - Anticipate increased pain with activity and pre-medicate as appropriate  Outcome: Progressing      Problem: SAFETY ADULT - FALL  Goal: Free from fall injury  Description: INTERVENTIONS:  - Assess pt frequently for physical needs  - Identify cognitive and physical deficits and behaviors that affect risk of falls.  - [place] fall precautions as indicated by assessment.  - Educate pt/family on patient safety including physical limitations  - Instruct pt to call for assistance with activity based on assessment  - Modify environment to reduce risk of injury  - Provide assistive devices as appropriate  - Consider OT/PT consult to assist with strengthening/mobility  - Encourage toileting schedule  Outcome: Progressing     Problem: DISCHARGE PLANNING  Goal: Discharge to home or other facility with appropriate resources  Description: INTERVENTIONS:  - Identify barriers to discharge w/pt and caregiver  - Include patient/family/discharge partner in discharge planning  - Arrange for needed discharge resources and transportation as appropriate  - Identify discharge learning needs (meds, wound care, etc)  - Arrange for interpreters to assist at discharge as needed  - Consider post-discharge preferences of patient/family/discharge partner  - Complete POLST form as appropriate  - Assess patient's ability to be responsible for managing their own health  - Refer to Case Management Department for coordinating discharge planning if the patient needs post-hospital services based on physician/LIP order or complex needs related to functional status, cognitive ability or social support system  Outcome: Progressing     Problem: GASTROINTESTINAL - ADULT  Goal: Minimal or absence of nausea and vomiting  Description: INTERVENTIONS:  - Maintain adequate hydration with IV or PO as ordered and tolerated  - Nasogastric tube to low intermittent suction as ordered  - Evaluate effectiveness of ordered antiemetic medications  - Provide nonpharmacologic comfort measures as appropriate  - Advance diet as tolerated, if ordered  - Obtain  nutritional consult as needed  - Evaluate fluid balance  Outcome: Progressing  Goal: Maintains or returns to baseline bowel function  Description: INTERVENTIONS:  - Assess bowel function  - Maintain adequate hydration with IV or PO as ordered and tolerated  - Evaluate effectiveness of GI medications  - Encourage mobilization and activity  - Obtain nutritional consult as needed  - Establish a toileting routine/schedule  - Consider collaborating with pharmacy to review patient's medication profile  Outcome: Progressing     Problem: METABOLIC/FLUID AND ELECTROLYTES - ADULT  Goal: Electrolytes maintained within normal limits  Description: INTERVENTIONS:  - Monitor labs and rhythm and assess patient for signs and symptoms of electrolyte imbalances  - Administer electrolyte replacement as ordered  - Monitor response to electrolyte replacements, including rhythm and repeat lab results as appropriate  - Fluid restriction as ordered  - Instruct patient on fluid and nutrition restrictions as appropriate  Outcome: Progressing  Goal: Hemodynamic stability and optimal renal function maintained  Description: INTERVENTIONS:  - Monitor labs and assess for signs and symptoms of volume excess or deficit  - Monitor intake, output and patient weight  - Monitor urine specific gravity, serum osmolarity and serum sodium as indicated or ordered  - Monitor response to interventions for patient's volume status, including labs, urine output, blood pressure (other measures as available)  - Encourage oral intake as appropriate  - Instruct patient on fluid and nutrition restrictions as appropriate  Outcome: Progressing     Problem: SKIN/TISSUE INTEGRITY - ADULT  Goal: Skin integrity remains intact  Description: INTERVENTIONS  - Assess and document risk factors for pressure ulcer development  - Assess and document skin integrity  - Monitor for areas of redness and/or skin breakdown  - Initiate interventions, skin care algorithm/standards of care  as needed  Outcome: Progressing

## 2024-10-21 NOTE — DISCHARGE SUMMARY
[facility]  part of Located within [facility]     DISCHARGE SUMMARY     Sharda Rai Patient Status:  Inpatient    1932 MRN [id number]   Location [facility] 5SW/SE Attending Omega Mcdonough MD   Hosp Day # 3 PCP No primary care provider on file.     DATE OF ADMISSION: 10/18/2024  DATE OF DISCHARGE:  10/21/24  DISPOSITION: home  CONDITION ON DISCHARGE: good    DISCHARGE DIAGNOSES:  Acute cholecystitis  E. coli bacteremia  Myasthenia gravis    Operations performed:  #1 laparoscopic cholecystectomy    HISTORY OF PRESENT ILLNESS (COPIED FROM ADMISSION H&P)  Sharda Rai is a(n) 89 year old female, past medical history significant for hypertension hyperlipidemia presents with complaint of right upper quadrant abdominal pain that started rather abruptly prior to arrival to the ER.  Describes the pain as a soreness aggravated by food with no relieving factors.  Pain is associate with nausea with 1 episode of emesis, mainly stomach contents.  Denies any fever chills denies any change in the color of her urine or stool.  Ultrasound done in ER shows evidence of acute cholecystitis with associated cholelithiasis.     HOSPITAL COURSE:  Patient was admitted, seen by general surgery.  Started on IV antibiotics.  Taken to the OR for procedure as described above.  She did well postoperatively.  Blood cultures subsequently grew out E. coli which was sensitive to Ancef.  Patient was not septic, not febrile, doing quite well.  She received IV antibiotics during this admission, and will be discharged on Keflex to finish a total of 14 days of antibiotics.  Cleared by general surgery for discharge    Patient understands return to the emergency room for increased pain, fever, discharge, shortness of breath, chest pain, new neurologic symptoms, other concerning symptoms.    PHYSICAL EXAM:  Temp:  [98.1 °F (36.7 °C)-98.7 °F (37.1 °C)] 98.4 °F (36.9 °C)  Pulse:  [] 112  Resp:  [18] 18  BP: (108-128)/(50-98)  116/98  SpO2:  [93 %-96 %] 93 %  Gen: A+Ox3.  No distress.   HEENT: NCAT, neck supple, no carotid bruit.  CV: RRR, S1S2, and intact distal pulses. No gallop, rub, murmur.  Pulm: Effort and breath sounds normal. No distress, wheezes, rales, rhonchi.  Abd: Soft, incisions clean dry and intact, appropriately tender to palpation, no rebound or guarding.  Neuro: Normal reflexes, CN. Sensory/motor exams grossly normal deficit. Coordination  and gait normal.   MS: No joint effusions.  No peripheral edema.  Skin: Skin is warm and dry. No rashes, erythema, diaphoresis.   Psych: Normal mood and affect. Behavior and judgment normal.     DISCHARGE MEDICATIONS     Discharge Medications        START taking these medications        Instructions Prescription details   acetaminophen 325 MG Tabs  Commonly known as: Tylenol      Take 2 tablets (650 mg total) by mouth every 6 (six) hours as needed.   Refills: 0     cephALEXin 500 MG Caps  Commonly known as: Keflex      Take 1 capsule (500 mg total) by mouth 4 (four) times daily for 12 days.   Stop taking on: November 2, 2024  Quantity: 48 capsule  Refills: 0            CONTINUE taking these medications        Instructions Prescription details   aspirin 81 MG Tbec      Take 1 tablet (81 mg total) by mouth daily.   Refills: 0     atorvastatin 40 MG Tabs  Commonly known as: Lipitor      Take 1 tablet (40 mg total) by mouth nightly.   Refills: 0     cholecalciferol 25 MCG (1000 UT) Tabs  Commonly known as: Vitamin D3      Take 1 tablet (1,000 Units total) by mouth daily.   Refills: 0     Creon [id number] units Cpep  Generic drug: Pancrelipase (Lip-Prot-Amyl)      Take 1-2 capsules by mouth 3 (three) times daily with meals. Pt reports taking 1 capsule at breakfast, 2 capsules at lunch, and 2 capsules at dinner.   Refills: 0     gabapentin 100 MG Caps  Commonly known as: Neurontin      Take 3 capsules (300 mg total) by mouth nightly.   Refills: 0     hydroCHLOROthiazide 25 MG Tabs       Take 1 tablet (25 mg total) by mouth daily.   Refills: 0     Imodium A-D 2 MG Caps  Generic drug: loperamide      Take 1 capsule (2 mg total) by mouth every evening.   Refills: 0     Meloxicam 15 MG Tabs      Take 1 tablet (15 mg total) by mouth daily.   Refills: 0     metoprolol succinate ER 50 MG Tb24  Commonly known as: Toprol XL      Take 1 tablet (50 mg total) by mouth every morning.   Refills: 0     multivitamin Tabs      Take 1 tablet by mouth daily.   Refills: 0     Mycophenolate Mofetil 500 MG Tabs  Commonly known as: CELLCEPT      Take 1 tablet (500 mg total) by mouth 2 (two) times daily.   Quantity: 180 tablet  Refills: 3     pantoprazole 40 MG Tbec  Commonly known as: Protonix      Take 1 tablet (40 mg total) by mouth every morning.   Refills: 0     VITAMIN B COMPLEX OR      vitamin B complex   Refills: 0               Where to Get Your Medications        These medications were sent to [id number] - [facility] [address] [facility] [phone], [phone]  [address] [place] [id number]      Phone: [phone]   cephALEXin 500 MG Caps         CONSULTANTS  Consultants         Provider   Role Specialty     Dustin Toribio MD      Consulting Physician Surgical Oncology            FOLLOW UP:  Dustin Toribio MD  [address] [facility] [id number] [phone]    Follow up  The office will call to schedule appointment    Dubin, Mark, MD  [facility] [facility] [address] [phone]    Follow up in 2 week(s)  Post Discharge Followup    The above plan and follow-up instructions were reviewed with the patient and they verbalized understanding and agreement.  They understand to return to the emergency room for any concerning signs or symptoms.  Greater than 30 minutes spent on discharge.  -----------------------    Hospital Discharge Diagnoses:  Acute cholecystitis    Lace+ Score: 44  59-90 High Risk  29-58 Medium Risk  0-28   Low Risk.    TCM Follow-Up Recommendation:  LACE 29-58: Moderate  Risk of readmission after discharge from the hospital.

## 2024-10-22 ENCOUNTER — TELEPHONE (OUTPATIENT)
Age: 89
End: 2024-10-22

## 2024-10-22 ENCOUNTER — TELEPHONE (OUTPATIENT)
Facility: CLINIC | Age: 89
End: 2024-10-22

## 2024-10-22 NOTE — TELEPHONE ENCOUNTER
Post op appointment scheduled with Dr. Toribio's PA on 11/1 at 9 am.    Patient agrees to call if any problems or concerns.

## 2024-10-22 NOTE — TELEPHONE ENCOUNTER
Post op call made to patient. Patient states she is doing well, soreness with movement. Denies fevers, no nausea or vomiting. States pain is minimal, just discomfort 2/10, controlled with PRN medication. Patient is tolerating activity without complaints. No concerns with surgical sites. Wound care instructions provided. Path is still pending.    Patient agrees to call if any problems or concerns.

## 2024-10-22 NOTE — OPERATIVE REPORT
Date of operation 10/19/2024    Preoperative diagnosis:  1.  Acute cholecystitis    Operations performed:  1.  Robotic assisted cholecystectomy    Postoperative diagnosis:  1.  Same    Operating Surgeon:  1.  Dustin Toribio MD    Assistant:  1.Surgical Assistant.: Hay Carmona RSA     Indications:  89-year-old female who was diagnosed with acute cholecystitis.  She presents for cholecystectomy.    Details of the operation:  Patient was brought to the operating room laid supine on the operating table.  General tracheal anesthesia was induced without complications.  All pressure points were padded appropriately.  The arms were tucked.  The abdomen was prepped and draped in the standard sterile manner.  Timeout was completed verify the patient's name, procedure be performed and administration of antibiotics.  Everybody in the room was in agreement.    A nick in the skin was made in the left upper quadrant a Verres needle was introduced.  Pneumoperitoneum was established.  4 x 8 mm working ports were placed in a straight line across the mid abdomen.  The robot was docked per usual.  Attention was directed towards the right upper quadrant.  Dome down approach was taken and the gallbladder was mobilized off of the its attachments to the liver bed.  A critical view of safety was obtained and 2 structures were noted entering into the gallbladder.  The cystic artery and cystic duct were doubly clipped and divided per usual.  Gallbladder was retrieved through a bag.  Hemostasis was excellent.  The gallbladder was extracted through the left hemiabdominal port.  The fascia was closed in a running manner using 0 Vicryl suture.  Skin subcutaneous tissue were irrigated and the skin approximated subcuticular manner.  I was present for the entire case.    Dustin Toribio MD  Complex General Surgical Oncology  [facility]  [email or web address]

## 2024-10-23 LAB
BACTERIA BLD CULT: POSITIVE
BLACTX-M ISLT/SPM QL: NOT DETECTED
E COLI DNA BLD POS QL NAA+NON-PROBE: DETECTED

## 2024-11-01 ENCOUNTER — OFFICE VISIT (OUTPATIENT)
Age: 89
End: 2024-11-01
Payer: MEDICARE

## 2024-11-01 VITALS
SYSTOLIC BLOOD PRESSURE: 114 MMHG | WEIGHT: 142.38 LBS | BODY MASS INDEX: 27 KG/M2 | OXYGEN SATURATION: 95 % | DIASTOLIC BLOOD PRESSURE: 66 MMHG | RESPIRATION RATE: 16 BRPM | HEART RATE: 79 BPM | TEMPERATURE: 98 F

## 2024-11-01 DIAGNOSIS — Z48.89 ENCOUNTER FOR POSTOPERATIVE CARE: Primary | ICD-10-CM

## 2024-11-01 NOTE — PROGRESS NOTES
S:  Sharda Rai is a 89 year old female sp robot assisted laparoscopic cholecystectomy with Dr. Toribio on 10/19.  Doing well, no fevers, no chills, tolerating a general diet, generally normal bowel movements, no calf tenderness nor lower extremity edema, no shortness of breath, no chest pain.   She reports having some constipation immediately after surgery which irritated her hemorrhoids. Luckily, both of those problems have resolved. She has some concerns with the healing of her incisions.    O:  /66 (BP Location: Left arm, Patient Position: Sitting, Cuff Size: adult)   Pulse 79   Temp 98 °F (36.7 °C) (Oral)   Resp 16   Wt 142 lb 6.4 oz (64.6 kg)   LMP  (LMP Unknown)   SpO2 95%   BMI 26.91 kg/m²   GEN:  No acute distress  L: nonlabored respirations  H: reg rate  Abd:  Soft, NT,ND.  Skin: Incision C D I, no eryth. Some expected inflammation around the incisions.  Extr: No edema, no calf tenderness    Path:  Reviewed w pt    Assessment   1. Encounter for postoperative care        Doing well post op.   Bowels have improved and wounds healing well.  Continue to keep incisions clean and dry.  Maintain a healthy diet.  Maintain good hydration.  F/u prn.         Taz Rubalcava PA-C

## 2024-11-19 ENCOUNTER — TELEPHONE (OUTPATIENT)
Dept: NEUROLOGY | Facility: CLINIC | Age: 89
End: 2024-11-19

## 2024-11-19 NOTE — TELEPHONE ENCOUNTER
Pt called asking for a refill on her gabapentin to be sent to [id number] - [place], [place] - [address] [place] [address] [phone], [phone]

## 2024-11-19 NOTE — TELEPHONE ENCOUNTER
Pt requesting a refill of GBP 300mg capsules.    Last office visit: 9/11/24    Next office visit: 3/11/25     Assessment  1. MG (myasthenia gravis) (HCC)  Seropositive myasthenia gravis, no thymoma.  We discussed possibility of increasing the dose of mycophenolate and may be doing bridging with IVIG if she gets worse in terms of her bulbar symptoms.  Patient declined that option at this time.  She was instructed to keep them watch over her symptoms and if any worsening she will have to call us back and we will discuss changing her treatment again.     - CBC With Differential With Platelet  - Comp Metabolic Panel (14) [E]; Future                 Education and counseling provided to patient. Instructed patient to call my office or seek medical attention immediately if symptoms worsen.  Patient verbalized understanding of information given. All questions were answered. All side effects of drugs were discussed.      Return to clinic in: Return in about 6 months (around 3/11/2025).     William Castillo MD

## 2024-11-20 RX ORDER — GABAPENTIN 300 MG/1
300 CAPSULE ORAL NIGHTLY
Qty: 90 CAPSULE | Refills: 1 | Status: SHIPPED | OUTPATIENT
Start: 2024-11-20

## 2025-01-01 ENCOUNTER — HOSPITAL ENCOUNTER (INPATIENT)
Facility: HOSPITAL | Age: OVER 89
LOS: 9 days | Discharge: NURSING, REHAB OR LONG-TERM CARE | End: 2025-01-10
Attending: EMERGENCY MEDICINE | Admitting: INTERNAL MEDICINE
Payer: MEDICARE

## 2025-01-01 ENCOUNTER — APPOINTMENT (OUTPATIENT)
Dept: GENERAL RADIOLOGY | Facility: HOSPITAL | Age: OVER 89
End: 2025-01-01
Attending: EMERGENCY MEDICINE
Payer: MEDICARE

## 2025-01-01 DIAGNOSIS — J96.01 ACUTE RESPIRATORY FAILURE WITH HYPOXIA (HCC): ICD-10-CM

## 2025-01-01 DIAGNOSIS — J18.9 COMMUNITY ACQUIRED PNEUMONIA OF LEFT LOWER LOBE OF LUNG: Primary | ICD-10-CM

## 2025-01-01 PROBLEM — D72.829 LEUKOCYTOSIS: Status: ACTIVE | Noted: 2025-01-01

## 2025-01-01 PROBLEM — R79.89 AZOTEMIA: Status: ACTIVE | Noted: 2025-01-01

## 2025-01-01 PROBLEM — R73.9 HYPERGLYCEMIA: Status: ACTIVE | Noted: 2025-01-01

## 2025-01-01 PROBLEM — E87.1 HYPONATREMIA: Status: ACTIVE | Noted: 2025-01-01

## 2025-01-01 LAB
ADENOVIRUS PCR:: NOT DETECTED
ANION GAP SERPL CALC-SCNC: 9 MMOL/L (ref 0–18)
B PARAPERT DNA SPEC QL NAA+PROBE: NOT DETECTED
B PERT DNA SPEC QL NAA+PROBE: NOT DETECTED
BASOPHILS # BLD AUTO: 0.04 X10(3) UL (ref 0–0.2)
BASOPHILS NFR BLD AUTO: 0.3 %
BILIRUB UR QL: NEGATIVE
BUN BLD-MCNC: 19 MG/DL (ref 9–23)
BUN/CREAT SERPL: 24.7 (ref 10–20)
C PNEUM DNA SPEC QL NAA+PROBE: NOT DETECTED
CALCIUM BLD-MCNC: 9.5 MG/DL (ref 8.7–10.4)
CHLORIDE SERPL-SCNC: 96 MMOL/L (ref 98–112)
CLARITY UR: CLEAR
CO2 SERPL-SCNC: 29 MMOL/L (ref 21–32)
COLOR UR: YELLOW
CORONAVIRUS 229E PCR:: NOT DETECTED
CORONAVIRUS HKU1 PCR:: NOT DETECTED
CORONAVIRUS NL63 PCR:: NOT DETECTED
CORONAVIRUS OC43 PCR:: NOT DETECTED
CREAT BLD-MCNC: 0.77 MG/DL
DEPRECATED RDW RBC AUTO: 46.3 FL (ref 35.1–46.3)
EGFRCR SERPLBLD CKD-EPI 2021: 72 ML/MIN/1.73M2 (ref 60–?)
EOSINOPHIL # BLD AUTO: 0 X10(3) UL (ref 0–0.7)
EOSINOPHIL NFR BLD AUTO: 0 %
ERYTHROCYTE [DISTWIDTH] IN BLOOD BY AUTOMATED COUNT: 13.8 % (ref 11–15)
FLUAV + FLUBV RNA SPEC NAA+PROBE: NEGATIVE
FLUAV + FLUBV RNA SPEC NAA+PROBE: NEGATIVE
FLUAV RNA SPEC QL NAA+PROBE: NOT DETECTED
FLUBV RNA SPEC QL NAA+PROBE: NOT DETECTED
GLUCOSE BLD-MCNC: 136 MG/DL (ref 70–99)
GLUCOSE UR-MCNC: NORMAL MG/DL
HCT VFR BLD AUTO: 41.9 %
HGB BLD-MCNC: 13.6 G/DL
IMM GRANULOCYTES # BLD AUTO: 0.06 X10(3) UL (ref 0–1)
IMM GRANULOCYTES NFR BLD: 0.4 %
KETONES UR-MCNC: NEGATIVE MG/DL
LACTATE SERPL-SCNC: 1.4 MMOL/L (ref 0.5–2)
LEUKOCYTE ESTERASE UR QL STRIP.AUTO: NEGATIVE
LYMPHOCYTES # BLD AUTO: 0.58 X10(3) UL (ref 1–4)
LYMPHOCYTES NFR BLD AUTO: 3.9 %
MCH RBC QN AUTO: 29.6 PG (ref 26–34)
MCHC RBC AUTO-ENTMCNC: 32.5 G/DL (ref 31–37)
MCV RBC AUTO: 91.3 FL
METAPNEUMOVIRUS PCR:: NOT DETECTED
MONOCYTES # BLD AUTO: 0.26 X10(3) UL (ref 0.1–1)
MONOCYTES NFR BLD AUTO: 1.8 %
MRSA DNA SPEC QL NAA+PROBE: NEGATIVE
MYCOPLASMA PNEUMONIA PCR:: NOT DETECTED
NEUTROPHILS # BLD AUTO: 13.79 X10 (3) UL (ref 1.5–7.7)
NEUTROPHILS # BLD AUTO: 13.79 X10(3) UL (ref 1.5–7.7)
NEUTROPHILS NFR BLD AUTO: 93.6 %
NITRITE UR QL STRIP.AUTO: NEGATIVE
NT-PROBNP SERPL-MCNC: 884 PG/ML (ref ?–450)
OSMOLALITY SERPL CALC.SUM OF ELEC: 282 MOSM/KG (ref 275–295)
PARAINFLUENZA 1 PCR:: NOT DETECTED
PARAINFLUENZA 2 PCR:: NOT DETECTED
PARAINFLUENZA 3 PCR:: DETECTED
PARAINFLUENZA 4 PCR:: NOT DETECTED
PH UR: 5.5 [PH] (ref 5–8)
PLATELET # BLD AUTO: 239 10(3)UL (ref 150–450)
POTASSIUM SERPL-SCNC: 3.6 MMOL/L (ref 3.5–5.1)
RBC # BLD AUTO: 4.59 X10(6)UL
RBC #/AREA URNS AUTO: >10 /HPF
RHINOVIRUS/ENTERO PCR:: NOT DETECTED
RSV RNA SPEC NAA+PROBE: NEGATIVE
RSV RNA SPEC QL NAA+PROBE: NOT DETECTED
SARS-COV-2 RNA NPH QL NAA+NON-PROBE: NOT DETECTED
SARS-COV-2 RNA RESP QL NAA+PROBE: NOT DETECTED
SODIUM SERPL-SCNC: 134 MMOL/L (ref 136–145)
SP GR UR STRIP: 1.02 (ref 1–1.03)
TROPONIN I SERPL HS-MCNC: 8 NG/L
UROBILINOGEN UR STRIP-ACNC: NORMAL
WBC # BLD AUTO: 14.7 X10(3) UL (ref 4–11)

## 2025-01-01 PROCEDURE — 83605 ASSAY OF LACTIC ACID: CPT | Performed by: EMERGENCY MEDICINE

## 2025-01-01 PROCEDURE — 85025 COMPLETE CBC W/AUTO DIFF WBC: CPT | Performed by: EMERGENCY MEDICINE

## 2025-01-01 PROCEDURE — 80048 BASIC METABOLIC PNL TOTAL CA: CPT | Performed by: EMERGENCY MEDICINE

## 2025-01-01 PROCEDURE — 0202U NFCT DS 22 TRGT SARS-COV-2: CPT | Performed by: INTERNAL MEDICINE

## 2025-01-01 PROCEDURE — 87040 BLOOD CULTURE FOR BACTERIA: CPT | Performed by: EMERGENCY MEDICINE

## 2025-01-01 PROCEDURE — 96365 THER/PROPH/DIAG IV INF INIT: CPT

## 2025-01-01 PROCEDURE — 94640 AIRWAY INHALATION TREATMENT: CPT

## 2025-01-01 PROCEDURE — 0241U SARS-COV-2/FLU A AND B/RSV BY PCR (GENEXPERT): CPT | Performed by: EMERGENCY MEDICINE

## 2025-01-01 PROCEDURE — 93010 ELECTROCARDIOGRAM REPORT: CPT

## 2025-01-01 PROCEDURE — 99285 EMERGENCY DEPT VISIT HI MDM: CPT

## 2025-01-01 PROCEDURE — 83880 ASSAY OF NATRIURETIC PEPTIDE: CPT | Performed by: EMERGENCY MEDICINE

## 2025-01-01 PROCEDURE — 81001 URINALYSIS AUTO W/SCOPE: CPT | Performed by: EMERGENCY MEDICINE

## 2025-01-01 PROCEDURE — 71045 X-RAY EXAM CHEST 1 VIEW: CPT | Performed by: EMERGENCY MEDICINE

## 2025-01-01 PROCEDURE — 84484 ASSAY OF TROPONIN QUANT: CPT | Performed by: EMERGENCY MEDICINE

## 2025-01-01 PROCEDURE — 36415 COLL VENOUS BLD VENIPUNCTURE: CPT

## 2025-01-01 PROCEDURE — 87641 MR-STAPH DNA AMP PROBE: CPT | Performed by: EMERGENCY MEDICINE

## 2025-01-01 PROCEDURE — 87581 M.PNEUMON DNA AMP PROBE: CPT | Performed by: INTERNAL MEDICINE

## 2025-01-01 PROCEDURE — 87493 C DIFF AMPLIFIED PROBE: CPT | Performed by: INTERNAL MEDICINE

## 2025-01-01 PROCEDURE — 93005 ELECTROCARDIOGRAM TRACING: CPT

## 2025-01-01 RX ORDER — BISACODYL 10 MG
10 SUPPOSITORY, RECTAL RECTAL
Status: DISCONTINUED | OUTPATIENT
Start: 2025-01-01 | End: 2025-01-10

## 2025-01-01 RX ORDER — HEPARIN SODIUM 5000 [USP'U]/ML
5000 INJECTION, SOLUTION INTRAVENOUS; SUBCUTANEOUS EVERY 8 HOURS SCHEDULED
Status: DISCONTINUED | OUTPATIENT
Start: 2025-01-01 | End: 2025-01-10

## 2025-01-01 RX ORDER — SENNOSIDES 8.6 MG
17.2 TABLET ORAL NIGHTLY PRN
Status: DISCONTINUED | OUTPATIENT
Start: 2025-01-01 | End: 2025-01-10

## 2025-01-01 RX ORDER — PANTOPRAZOLE SODIUM 40 MG/1
40 TABLET, DELAYED RELEASE ORAL EVERY MORNING
Status: DISCONTINUED | OUTPATIENT
Start: 2025-01-02 | End: 2025-01-10

## 2025-01-01 RX ORDER — LOPERAMIDE HYDROCHLORIDE 2 MG/1
2 CAPSULE ORAL EVERY EVENING
Status: DISCONTINUED | OUTPATIENT
Start: 2025-01-01 | End: 2025-01-03

## 2025-01-01 RX ORDER — AZITHROMYCIN 250 MG/1
500 TABLET, FILM COATED ORAL
Status: DISCONTINUED | OUTPATIENT
Start: 2025-01-01 | End: 2025-01-06

## 2025-01-01 RX ORDER — POLYETHYLENE GLYCOL 3350 17 G/17G
17 POWDER, FOR SOLUTION ORAL DAILY PRN
Status: DISCONTINUED | OUTPATIENT
Start: 2025-01-01 | End: 2025-01-10

## 2025-01-01 RX ORDER — SODIUM CHLORIDE 9 MG/ML
INJECTION, SOLUTION INTRAVENOUS CONTINUOUS
Status: ACTIVE | OUTPATIENT
Start: 2025-01-01 | End: 2025-01-02

## 2025-01-01 RX ORDER — ACETAMINOPHEN 500 MG
500 TABLET ORAL EVERY 4 HOURS PRN
Status: DISCONTINUED | OUTPATIENT
Start: 2025-01-01 | End: 2025-01-10

## 2025-01-01 RX ORDER — ACETAMINOPHEN 500 MG
1000 TABLET ORAL ONCE
Status: COMPLETED | OUTPATIENT
Start: 2025-01-01 | End: 2025-01-01

## 2025-01-01 RX ORDER — ONDANSETRON 2 MG/ML
4 INJECTION INTRAMUSCULAR; INTRAVENOUS EVERY 6 HOURS PRN
Status: DISCONTINUED | OUTPATIENT
Start: 2025-01-01 | End: 2025-01-10

## 2025-01-01 RX ORDER — METOPROLOL SUCCINATE 50 MG/1
50 TABLET, EXTENDED RELEASE ORAL EVERY MORNING
Status: DISCONTINUED | OUTPATIENT
Start: 2025-01-02 | End: 2025-01-10

## 2025-01-01 RX ORDER — SODIUM PHOSPHATE, DIBASIC AND SODIUM PHOSPHATE, MONOBASIC 7; 19 G/230ML; G/230ML
1 ENEMA RECTAL ONCE AS NEEDED
Status: DISCONTINUED | OUTPATIENT
Start: 2025-01-01 | End: 2025-01-10

## 2025-01-01 RX ORDER — MYCOPHENOLATE MOFETIL 250 MG/1
500 CAPSULE ORAL 2 TIMES DAILY
Status: DISCONTINUED | OUTPATIENT
Start: 2025-01-01 | End: 2025-01-10

## 2025-01-01 RX ORDER — ATORVASTATIN CALCIUM 40 MG/1
40 TABLET, FILM COATED ORAL NIGHTLY
Status: DISCONTINUED | OUTPATIENT
Start: 2025-01-01 | End: 2025-01-10

## 2025-01-01 RX ORDER — METOCLOPRAMIDE HYDROCHLORIDE 5 MG/ML
5 INJECTION INTRAMUSCULAR; INTRAVENOUS EVERY 8 HOURS PRN
Status: DISCONTINUED | OUTPATIENT
Start: 2025-01-01 | End: 2025-01-04

## 2025-01-01 RX ORDER — IPRATROPIUM BROMIDE AND ALBUTEROL SULFATE 2.5; .5 MG/3ML; MG/3ML
3 SOLUTION RESPIRATORY (INHALATION) ONCE
Status: COMPLETED | OUTPATIENT
Start: 2025-01-01 | End: 2025-01-01

## 2025-01-01 RX ORDER — ASPIRIN 81 MG/1
81 TABLET ORAL DAILY
Status: DISCONTINUED | OUTPATIENT
Start: 2025-01-02 | End: 2025-01-10

## 2025-01-01 RX ORDER — GABAPENTIN 300 MG/1
300 CAPSULE ORAL NIGHTLY
Status: DISCONTINUED | OUTPATIENT
Start: 2025-01-01 | End: 2025-01-10

## 2025-01-01 NOTE — H&P
[facility] Hospitalist H&P       CC:   Chief Complaint   Patient presents with    Shortness Of Breath        PCP: No primary care provider on file.    History of Present Illness: Patient is a 89 year old female with PMH hypertension, hyperlipidemia, pancreatic insufficiency, myasthenia gravis who presents with approximately 7 to 9 days of increasing cough and congestion difficult time expelling sputum things took a turn for the worse on Monday where she developed chills and increasing fatigue felt very tired.  Ultimately family brought her to the hospital for further evaluation she was found to have retrocardiac airspace density consistent with pneumonia on chest x-ray she was started on Zosyn in the emergency department and admitted to the hospital for further management.   Upon my evaluation patient denies any history of aspiration events she states she does not feel shortness of breath currently but still feels somewhat congested has a hard time managing her sputum as it has been thicker than typical she has been compliant with her medications at home including her mycophenolate for myasthenia.   According to daughter at bedside patient's  was also recently sick they live in an assisted living facility.  His symptoms included diarrhea however.       PMH  Past Medical History:    Blepharitis    Dermatochalasis    Dry eye syndrome of both eyes    Essential hypertension    Gallstones    Hyperlipidemia    Macular pucker, bilateral    Myasthenia gravis (HCC)    Pancreatic insufficiency (HCC)    Pseudophakia, both eyes        PSH  Past Surgical History:   Procedure Laterality Date    Cataract extraction w/  intraocular lens implant Left 02/20/2013    Dr. Roseann frost trad     Cataract extraction w/  intraocular lens implant Right     Yag capsulotomy - od - right eye Right 04/10/2007        ALL:  Allergies[1]     Home Medications:  Medications Taking[2]      Soc Hx  Social History     Tobacco Use     Smoking status: Never    Smokeless tobacco: Never   Substance Use Topics    Alcohol use: Never        Fam Hx  Family History   Problem Relation Age of Onset    Retinal detachment Father     Glaucoma Mother        Review of Systems  Comprehensive ROS reviewed and negative except for what's stated above.     OBJECTIVE:  /44 (BP Location: Right arm)   Pulse 118   Temp 98.4 °F (36.9 °C) (Oral)   Resp 24   Ht 5' 1\" (1.549 m)   Wt 137 lb (62.1 kg)   LMP  (LMP Unknown)   SpO2 95%   BMI 25.89 kg/m²   General:  Alert, no distress, nasal cannula in place somewhat muffled speech   Head:  Normocephalic   Eyes:  Sclera anicteric   Nose: Nares normal. Septum midline   Throat: Lips, mucosa, and tongue normal.    Neck: Supple, symmetrical, trachea midline   Lungs:   Clear to auscultation bilaterally   Chest wall:  No tenderness or deformity.   Heart:  Slightly tachycardic but regular, soft systolic murmur   Abdomen:   Soft, non-tender. Bowel sounds normal   Extremities: Extremities normal, atraumatic. Trace edema    Skin: Skin color, texture, turgor normal   Neurologic: Normal strength.     Diagnostic Data:    CBC/Chem  Recent Labs   Lab 01/01/25  1023   WBC 14.7*   HGB 13.6   MCV 91.3   .0       Recent Labs   Lab 01/01/25  1023   *   K 3.6   CL 96*   CO2 29.0   BUN 19   CREATSERUM 0.77   *   CA 9.5       No results for input(s): \"ALT\", \"AST\", \"ALB\", \"AMYLASE\", \"LIPASE\", \"LDH\" in the last 168 hours.    Invalid input(s): \"ALPHOS\", \"TBIL\", \"DBIL\", \"TPROT\"    No results for input(s): \"TROP\" in the last 168 hours.    Additional Diagnostics: ECG: Sinus tach     CXR: image personally reviewed Possible retrocardiac opacity     Radiology: XR CHEST AP PORTABLE  (CPT=71045)    Result Date: 1/1/2025  CONCLUSION:   Retrocardiac air space density which may represent atelectasis versus pneumonia. Short-term followup suggested to ensure resolution.    Dictated by (CST): Fortino Preston MD on 1/01/2025 at 11:38 AM      Finalized by (CST): Fortino Preston MD on 1/01/2025 at 11:39 AM             ASSESSMENT / PLAN:   Patient is a 89 year old female with PMH hypertension, hyperlipidemia, pancreatic insufficiency, myasthenia gravis who presents with approximately 7 to 9 days of increasing cough and congestion difficult time expelling sputum things took a turn for the worse on Monday where she developed chills and increasing fatigue felt very tired.  Ultimately family brought her to the hospital for further evaluation she was found to have retrocardiac airspace density consistent with pneumonia.     Community-acquired pneumonia retrocardiac infiltrate noted presumed bacterial  -Continue Zosyn and azithromycin  -Given recent sick contacts viral illness is also possible we will check expanded respiratory panel Legionella and mycoplasma  -Start dextromethorphan guaifenesin scheduled given increased sputum viscosity  -Trend CBC     2.  Myasthenia gravis without acute flare  -Continue home mycophenolate Moffa till    3.  Mild hyponatremia  -Monitor and encourage p.o. fluids    4.  Hyperlipidemia  -Continue home statin    5.  Essential hypertension  Continue home metoprolol    6.  Chronic weakness PT OT eval    7.  Chronic diarrhea resume home Imodium and pancrelipase      FN:  - IVF:None  - Diet:General diet     DVT Prophy:Heparin TID   Lines:PIV    FULL CODE confirmed on admission     Dispo: pending clinical course    Outpatient records or previous hospital records reviewed.     Further recommendations pending patient's clinical course.  [facility] hospitalist to continue to follow patient while in house    Patient and/or patient's family given opportunity to ask questions and note understanding and agreeing with therapeutic plan as outlined    Thank You,  Hugo Lira DO    Hospitalist with [facility]  Answering Service number: [phone]         [1]   Allergies  Allergen Reactions    Shellfish DIARRHEA   [2]   No outpatient  medications have been marked as taking for the 1/1/25 encounter (Hospital Encounter).

## 2025-01-01 NOTE — PLAN OF CARE
Problem: Patient Centered Care  Goal: Patient preferences are identified and integrated in the patient's plan of care  Description: Interventions:  - What would you like us to know as we care for you? I did not feel well starting last night.  - Provide timely, complete, and accurate information to patient/family  - Incorporate patient and family knowledge, values, beliefs, and cultural backgrounds into the planning and delivery of care  - Encourage patient/family to participate in care and decision-making at the level they choose  - Honor patient and family perspectives and choices  Outcome: Progressing     Problem: Patient/Family Goals  Goal: Patient/Family Long Term Goal  Description: Patient's Long Term Goal: to be free of infection.    Interventions:  - iv and oral antibiotics given.  - See additional Care Plan goals for specific interventions  Outcome: Progressing  Goal: Patient/Family Short Term Goal  Description: Patient's Short Term Goal: to have a clear plan for discharge.    Interventions:   - case management initiated.  - See additional Care Plan goals for specific interventions  Outcome: Progressing     Problem: PAIN - ADULT  Goal: Verbalizes/displays adequate comfort level or patient's stated pain goal  Description: INTERVENTIONS:  - Encourage pt to monitor pain and request assistance  - Assess pain using appropriate pain scale  - Administer analgesics based on type and severity of pain and evaluate response  - Implement non-pharmacological measures as appropriate and evaluate response  - Consider cultural and social influences on pain and pain management  - Manage/alleviate anxiety  - Utilize distraction and/or relaxation techniques  - Monitor for opioid side effects  - Notify MD/LIP if interventions unsuccessful or patient reports new pain  - Anticipate increased pain with activity and pre-medicate as appropriate  Outcome: Progressing     Problem: RISK FOR INFECTION - ADULT  Goal: Absence of  fever/infection during anticipated neutropenic period  Description: INTERVENTIONS  - Monitor WBC  - Administer growth factors as ordered  - Implement neutropenic guidelines  Outcome: Progressing     Problem: SAFETY ADULT - FALL  Goal: Free from fall injury  Description: INTERVENTIONS:  - Assess pt frequently for physical needs  - Identify cognitive and physical deficits and behaviors that affect risk of falls.  - [place] fall precautions as indicated by assessment.  - Educate pt/family on patient safety including physical limitations  - Instruct pt to call for assistance with activity based on assessment  - Modify environment to reduce risk of injury  - Provide assistive devices as appropriate  - Consider OT/PT consult to assist with strengthening/mobility  - Encourage toileting schedule  Outcome: Progressing     Problem: DISCHARGE PLANNING  Goal: Discharge to home or other facility with appropriate resources  Description: INTERVENTIONS:  - Identify barriers to discharge w/pt and caregiver  - Include patient/family/discharge partner in discharge planning  - Arrange for needed discharge resources and transportation as appropriate  - Identify discharge learning needs (meds, wound care, etc)  - Arrange for interpreters to assist at discharge as needed  - Consider post-discharge preferences of patient/family/discharge partner  - Complete POLST form as appropriate  - Assess patient's ability to be responsible for managing their own health  - Refer to Case Management Department for coordinating discharge planning if the patient needs post-hospital services based on physician/LIP order or complex needs related to functional status, cognitive ability or social support system  Outcome: Progressing   Patient was seen at bedside by Dr. Lira. Home meds resumed. IV and oral antibiotics started. Swallows pills well with a sip of water.

## 2025-01-01 NOTE — ED QUICK NOTES
Orders for admission, patient is aware of plan and ready to go upstairs. Any questions, please call ED RN Cece at extension [phone].     Patient Covid vaccination status: Unvaccinated     COVID Test Ordered in ED: SARS-CoV-2/Flu A and B/RSV by PCR (GeneXpert)    COVID Suspicion at Admission: N/A    Running Infusions:  None    Mental Status/LOC at time of transport: a&ox4    Other pertinent information:   CIWA score: N/A   NIH score:  N/A

## 2025-01-01 NOTE — ED PROVIDER NOTES
Patient Seen in: [facility] Emergency Department      History     Chief Complaint   Patient presents with    Shortness Of Breath     Stated Complaint: sob/cough    Subjective:   HPI      Patient presents the emergency department complaining of cough, congestion, shortness of breath and fever.  Symptoms been ongoing for 3 days.  There is no vomiting or diarrhea.  There is no other aggravating or alleviating factors.    Objective:     Past Medical History:    Blepharitis    Dermatochalasis    Dry eye syndrome of both eyes    Essential hypertension    Gallstones    Hyperlipidemia    Macular pucker, bilateral    Myasthenia gravis (HCC)    Pancreatic insufficiency (HCC)    Pseudophakia, both eyes              Past Surgical History:   Procedure Laterality Date    Cataract extraction w/  intraocular lens implant Left 02/20/2013    Dr. Roseann leo     Cataract extraction w/  intraocular lens implant Right     Yag capsulotomy - od - right eye Right 04/10/2007                Social History     Socioeconomic History    Marital status:    Tobacco Use    Smoking status: Never    Smokeless tobacco: Never   Vaping Use    Vaping status: Never Used   Substance and Sexual Activity    Alcohol use: Never    Drug use: Never     Social Drivers of Health     Food Insecurity: No Food Insecurity (1/1/2025)    Food Insecurity     Food Insecurity: Never true   Transportation Needs: No Transportation Needs (1/1/2025)    Transportation Needs     Lack of Transportation: No    [facility]    Social Connections   Housing Stability: Low Risk  (1/1/2025)    Housing Stability     Housing Instability: No                  Physical Exam     ED Triage Vitals [01/01/25 1016]   /69   Pulse (!) 125   Resp 24   Temp 98.7 °F (37.1 °C)   Temp src Temporal   SpO2 92 %   O2 Device None (Room air)       Current Vitals:   Vital Signs  BP: 102/44  Pulse: 110  Resp: 24  Temp:  98.4 °F (36.9 °C)  Temp src: Oral  MAP (mmHg): (!) 59    Oxygen Therapy  SpO2: 95 %  O2 Device: Nasal cannula  O2 Flow Rate (L/min): 3 L/min        Physical Exam  Vitals and nursing note reviewed.   Constitutional:       General: She is not in acute distress.     Appearance: She is well-developed.   HENT:      Head: Normocephalic.      Nose: Nose normal.      Mouth/Throat:      Mouth: Mucous membranes are moist.   Eyes:      Conjunctiva/sclera: Conjunctivae normal.   Cardiovascular:      Rate and Rhythm: Normal rate and regular rhythm.      Heart sounds: No murmur heard.  Pulmonary:      Effort: Pulmonary effort is normal. No respiratory distress.      Breath sounds: Normal breath sounds.   Abdominal:      General: There is no distension.      Palpations: Abdomen is soft.      Tenderness: There is no abdominal tenderness.   Musculoskeletal:         General: No tenderness. Normal range of motion.      Cervical back: Normal range of motion and neck supple.   Skin:     General: Skin is warm and dry.      Capillary Refill: Capillary refill takes less than 2 seconds.      Findings: No rash.   Neurological:      General: No focal deficit present.      Mental Status: She is alert and oriented to person, place, and time.      Cranial Nerves: No cranial nerve deficit.      Motor: No weakness.             ED Course     Labs Reviewed   CBC WITH DIFFERENTIAL WITH PLATELET - Abnormal; Notable for the following components:       Result Value    WBC 14.7 (*)     Neutrophil Absolute Prelim 13.79 (*)     Neutrophil Absolute 13.79 (*)     Lymphocyte Absolute 0.58 (*)     All other components within normal limits   BASIC METABOLIC PANEL (8) - Abnormal; Notable for the following components:    Glucose 136 (*)     Sodium 134 (*)     Chloride 96 (*)     BUN/CREA Ratio 24.7 (*)     All other components within normal limits   URINALYSIS, ROUTINE - Abnormal; Notable for the following components:    Blood Urine 1+ (*)     Protein Urine Trace  (*)     RBC Urine >10 (*)     All other components within normal limits   PRO BETA NATRIURETIC PEPTIDE - Abnormal; Notable for the following components:    Pro-Beta Natriuretic Peptide 884 (*)     All other components within normal limits   TROPONIN I HIGH SENSITIVITY - Normal   LACTIC ACID, PLASMA - Normal   SARS-COV-2/FLU A AND B/RSV BY PCR (GENEXPERT) - Normal    Narrative:     This test is intended for the qualitative detection and differentiation of SARS-CoV-2, influenza A, influenza B, and respiratory syncytial virus (RSV) viral RNA in nasopharyngeal or nares swabs from individuals suspected of respiratory viral infection consistent with COVID-19 by their healthcare provider. Signs and symptoms of respiratory viral infection due to SARS-CoV-2, influenza, and RSV can be similar.    Test performed using the Xpert Xpress SARS-CoV-2/FLU/RSV (real time RT-PCR)  assay on the [employer]imod[employer] instrument, [employer], [place] [address].   This test is being used under the Food and Drug Administration's Emergency Use Authorization.    The authorized Fact Sheet for Healthcare Providers for this assay is available upon request from the laboratory.   ED/MRSA SCREEN BY PCR-CC - Normal   LEGIONELLA URINE AG SEROGRP 1   MYCOPLASMA PNEUMONIAE BY PCR   RAINBOW DRAW LAVENDER   RAINBOW DRAW LIGHT GREEN   RAINBOW DRAW BLUE   RAINBOW DRAW GOLD   BLOOD CULTURE   BLOOD CULTURE   RESPIRATORY FLU EXPAND PANEL + COVID-19   C. DIFFICILE(TOXIGENIC)PCR     EKG    Rate, intervals and axes as noted on EKG Report.  Rate: 119 bpm  Rhythm: Sinus Rhythm  Reading: Sinus tachycardia, nonspecific changes                       MDM          Admission disposition: 1/1/2025 11:48 AM           Medical Decision Making  Differential diagnosis considered for pneumonia, viral bronchiolitis, pleural effusion, congestive heart failure.    Problems Addressed:  Acute respiratory failure with hypoxia (HCC): acute illness or injury  Community acquired pneumonia of left  lower lobe of lung: acute illness or injury    Amount and/or Complexity of Data Reviewed  Labs: ordered. Decision-making details documented in ED Course.     Details: CBC with elevated white blood cell count.  Normal lactic acid.  Chemistry panel unremarkable.  Radiology: ordered and independent interpretation performed. Decision-making details documented in ED Course.     Details: Chest x-ray consistent with pneumonia  ECG/medicine tests: ordered and independent interpretation performed. Decision-making details documented in ED Course.  Discussion of management or test interpretation with external provider(s): Patient was given a nebulizer treatment as well as antibiotics following blood cultures and lactic acid which was normal.  No signs of severe sepsis.  Zosyn was initiated due to her myasthenia gravis diagnosis limiting antibiotic choices to avoid exacerbation.    Risk  Prescription drug management.  Decision regarding hospitalization.        Disposition and Plan     Clinical Impression:  1. Community acquired pneumonia of left lower lobe of lung    2. Acute respiratory failure with hypoxia (HCC)         Disposition:  Admit  1/1/2025 11:48 am    Follow-up:  No follow-up provider specified.  We recommend that you schedule follow up care with a primary care provider within the next three months to obtain basic health screening including reassessment of your blood pressure.      Medications Prescribed:  Current Discharge Medication List              Supplementary Documentation:         Hospital Problems       Present on Admission  Date Reviewed: 11/1/2024            ICD-10-CM Noted POA    * (Principal) Community acquired pneumonia of left lower lobe of lung J18.9 1/1/2025 Unknown    Azotemia R79.89 1/1/2025 Yes    Hyperglycemia R73.9 1/1/2025 Yes    Hyponatremia E87.1 1/1/2025 Yes    Leukocytosis D72.829 1/1/2025 Yes

## 2025-01-02 LAB
ADENOVIRUS F 40/41 PCR: NEGATIVE
ANION GAP SERPL CALC-SCNC: 5 MMOL/L (ref 0–18)
ASTROVIRUS PCR: NEGATIVE
ATRIAL RATE: 119 BPM
BUN BLD-MCNC: 26 MG/DL (ref 9–23)
BUN/CREAT SERPL: 32.1 (ref 10–20)
C CAYETANENSIS DNA SPEC QL NAA+PROBE: NEGATIVE
C DIFF TOX B STL QL: NEGATIVE
CALCIUM BLD-MCNC: 8.2 MG/DL (ref 8.7–10.4)
CAMPY SP DNA.DIARRHEA STL QL NAA+PROBE: NEGATIVE
CHLORIDE SERPL-SCNC: 98 MMOL/L (ref 98–112)
CO2 SERPL-SCNC: 31 MMOL/L (ref 21–32)
CREAT BLD-MCNC: 0.81 MG/DL
CRYPTOSP DNA SPEC QL NAA+PROBE: NEGATIVE
DEPRECATED RDW RBC AUTO: 45 FL (ref 35.1–46.3)
EAEC PAA PLAS AGGR+AATA ST NAA+NON-PRB: NEGATIVE
EC STX1+STX2 + H7 FLIC SPEC NAA+PROBE: NEGATIVE
EGFRCR SERPLBLD CKD-EPI 2021: 68 ML/MIN/1.73M2 (ref 60–?)
ENTAMOEBA HISTOLYTICA PCR: NEGATIVE
EPEC EAE GENE STL QL NAA+NON-PROBE: NEGATIVE
ERYTHROCYTE [DISTWIDTH] IN BLOOD BY AUTOMATED COUNT: 13.5 % (ref 11–15)
ETEC LTA+ST1A+ST1B TOX ST NAA+NON-PROBE: NEGATIVE
GIARDIA LAMBLIA PCR: NEGATIVE
GLUCOSE BLD-MCNC: 99 MG/DL (ref 70–99)
HCT VFR BLD AUTO: 32.4 %
HGB BLD-MCNC: 11.2 G/DL
L PNEUMO AG UR QL: NEGATIVE
MCH RBC QN AUTO: 31.2 PG (ref 26–34)
MCHC RBC AUTO-ENTMCNC: 34.6 G/DL (ref 31–37)
MCV RBC AUTO: 90.3 FL
NOROVIRUS GI/GII PCR: POSITIVE
OSMOLALITY SERPL CALC.SUM OF ELEC: 283 MOSM/KG (ref 275–295)
P AXIS: 56 DEGREES
P SHIGELLOIDES DNA STL QL NAA+PROBE: NEGATIVE
P-R INTERVAL: 122 MS
PLATELET # BLD AUTO: 167 10(3)UL (ref 150–450)
POTASSIUM SERPL-SCNC: 2.8 MMOL/L (ref 3.5–5.1)
POTASSIUM SERPL-SCNC: 3.9 MMOL/L (ref 3.5–5.1)
Q-T INTERVAL: 322 MS
QRS DURATION: 84 MS
QTC CALCULATION (BEZET): 452 MS
R AXIS: 47 DEGREES
RBC # BLD AUTO: 3.59 X10(6)UL
ROTAVIRUS A PCR: NEGATIVE
SALMONELLA DNA SPEC QL NAA+PROBE: NEGATIVE
SAPOVIRUS PCR: NEGATIVE
SHIGELLA SP+EIEC IPAH ST NAA+NON-PROBE: NEGATIVE
SODIUM SERPL-SCNC: 134 MMOL/L (ref 136–145)
T AXIS: 22 DEGREES
V CHOLERAE DNA SPEC QL NAA+PROBE: NEGATIVE
VENTRICULAR RATE: 119 BPM
VIBRIO DNA SPEC NAA+PROBE: NEGATIVE
WBC # BLD AUTO: 11 X10(3) UL (ref 4–11)
YERSINIA DNA SPEC NAA+PROBE: NEGATIVE

## 2025-01-02 PROCEDURE — 97166 OT EVAL MOD COMPLEX 45 MIN: CPT

## 2025-01-02 PROCEDURE — 84132 ASSAY OF SERUM POTASSIUM: CPT | Performed by: INTERNAL MEDICINE

## 2025-01-02 PROCEDURE — 97530 THERAPEUTIC ACTIVITIES: CPT

## 2025-01-02 PROCEDURE — 80048 BASIC METABOLIC PNL TOTAL CA: CPT | Performed by: INTERNAL MEDICINE

## 2025-01-02 PROCEDURE — 87507 IADNA-DNA/RNA PROBE TQ 12-25: CPT | Performed by: INTERNAL MEDICINE

## 2025-01-02 PROCEDURE — 94799 UNLISTED PULMONARY SVC/PX: CPT

## 2025-01-02 PROCEDURE — 97162 PT EVAL MOD COMPLEX 30 MIN: CPT

## 2025-01-02 PROCEDURE — 87449 NOS EACH ORGANISM AG IA: CPT | Performed by: INTERNAL MEDICINE

## 2025-01-02 PROCEDURE — 85027 COMPLETE CBC AUTOMATED: CPT | Performed by: INTERNAL MEDICINE

## 2025-01-02 RX ORDER — POTASSIUM CHLORIDE 14.9 MG/ML
20 INJECTION INTRAVENOUS ONCE
Status: COMPLETED | OUTPATIENT
Start: 2025-01-02 | End: 2025-01-02

## 2025-01-02 RX ORDER — SODIUM CHLORIDE 9 MG/ML
INJECTION, SOLUTION INTRAVENOUS CONTINUOUS
Status: DISCONTINUED | OUTPATIENT
Start: 2025-01-02 | End: 2025-01-03

## 2025-01-02 NOTE — PLAN OF CARE
Patient Alert and Oriented x4. Droplet & Contact precaution. X1 assist with walker. Persistent diarrhea. On 3l nasal cannula. Poor oral intake, IV fluids started. Fall precautions in place. Call light and personal belongings within arms reach.   Problem: Patient Centered Care  Goal: Patient preferences are identified and integrated in the patient's plan of care  Description: Interventions:  - What would you like us to know as we care for you?   - Provide timely, complete, and accurate information to patient/family  - Incorporate patient and family knowledge, values, beliefs, and cultural backgrounds into the planning and delivery of care  - Encourage patient/family to participate in care and decision-making at the level they choose  - Honor patient and family perspectives and choices  Outcome: Progressing     Problem: PAIN - ADULT  Goal: Verbalizes/displays adequate comfort level or patient's stated pain goal  Description: INTERVENTIONS:  - Encourage pt to monitor pain and request assistance  - Assess pain using appropriate pain scale  - Administer analgesics based on type and severity of pain and evaluate response  - Implement non-pharmacological measures as appropriate and evaluate response  - Consider cultural and social influences on pain and pain management  - Manage/alleviate anxiety  - Utilize distraction and/or relaxation techniques  - Monitor for opioid side effects  - Notify MD/LIP if interventions unsuccessful or patient reports new pain  - Anticipate increased pain with activity and pre-medicate as appropriate  Outcome: Progressing     Problem: RISK FOR INFECTION - ADULT  Goal: Absence of fever/infection during anticipated neutropenic period  Description: INTERVENTIONS  - Monitor WBC  - Administer growth factors as ordered  - Implement neutropenic guidelines  Outcome: Progressing     Problem: SAFETY ADULT - FALL  Goal: Free from fall injury  Description: INTERVENTIONS:  - Assess pt frequently for  physical needs  - Identify cognitive and physical deficits and behaviors that affect risk of falls.  - [place] fall precautions as indicated by assessment.  - Educate pt/family on patient safety including physical limitations  - Instruct pt to call for assistance with activity based on assessment  - Modify environment to reduce risk of injury  - Provide assistive devices as appropriate  - Consider OT/PT consult to assist with strengthening/mobility  - Encourage toileting schedule  Outcome: Progressing

## 2025-01-02 NOTE — PROGRESS NOTES
[facility] Hospitalist Progress Note     CC: Hospital Follow up    PCP: No primary care provider on file.       Assessment/Plan:     Principal Problem:    Community acquired pneumonia of left lower lobe of lung  Active Problems:    Hyponatremia    Leukocytosis    Azotemia    Hyperglycemia    Patient is a 89 year old female with PMH hypertension, hyperlipidemia, pancreatic insufficiency, myasthenia gravis who presents with approximately 7 to 9 days of increasing cough and congestion difficult time expelling sputum things took a turn for the worse on Monday where she developed chills and increasing fatigue felt very tired.  Ultimately family brought her to the hospital for further evaluation she was found to have retrocardiac airspace density consistent with pneumonia.      Community-acquired pneumonia retrocardiac infiltrate noted presumed bacterial  -Continue Zosyn and azithromycin  -Also with parainfluenza noted as well  States cough is now productive but she is feeling somewhat better today   Still tired  Legionella negative  - mycoplasma pending   -Start dextromethorphan guaifenesin scheduled given increased sputum viscosity  -Trend CBC- WBC improved    Wean O2   IS   Up in bed     2.  Myasthenia gravis without acute flare  -Continue home mycophenolate     3.  Mild hyponatremia- improved  PO intake is still poor, resume gentle fluids  Hypokalemia likely from emesis and poor PO intake - trend      4.  Hyperlipidemia  -Continue home statin     5.  Essential hypertension  Continue home metoprolol     6.  Chronic weakness PT OT eval     7.  Chronic diarrhea resume home Imodium and pancrelipase        FN:  - IVF:None  - Diet:General diet      DVT Prophy:Heparin TID   Lines:PIV      Questions/concerns were discussed with patient and/or family by bedside.    Thank You,  Hugo Lira, DO    Hospitalist with [facility]     Subjective:     Feels persistent cough although this is now productive, slightly  more alert and with it today then on admission,but still feels tired     OBJECTIVE:    Blood pressure 95/80, pulse 118, temperature 97.6 °F (36.4 °C), temperature source Oral, resp. rate 20, height 5' 1\" (1.549 m), weight 137 lb (62.1 kg), SpO2 93%.    Temp:  [97.6 °F (36.4 °C)-100.8 °F (38.2 °C)] 97.6 °F (36.4 °C)  Pulse:  [] 118  Resp:  [20-24] 20  BP: ()/(44-80) 95/80  SpO2:  [93 %-96 %] 93 %      Intake/Output:    Intake/Output Summary (Last 24 hours) at 1/2/2025 1109  Last data filed at 1/2/2025 0205  Gross per 24 hour   Intake 100 ml   Output 800 ml   Net -700 ml       Last 3 Weights   01/01/25 1016 137 lb (62.1 kg)   11/01/24 0913 142 lb 6.4 oz (64.6 kg)   10/18/24 2156 140 lb 9.6 oz (63.8 kg)   10/18/24 1840 140 lb (63.5 kg)       Exam   Gen: No acute distress, alert and oriented x3, frail appearing   Heent: NC AT, neck supple  Pulm: Congestion in both lungs worse on right , no wheezes   CV: Heart with regular rate and rhythm, no peripheral edema  Abd: Abdomen soft, nontender, nondistended,       Data Review:       Labs:     Recent Labs   Lab 01/01/25  1023 01/02/25  0422   RBC 4.59 3.59*   HGB 13.6 11.2*   HCT 41.9 32.4*   MCV 91.3 90.3   MCH 29.6 31.2   MCHC 32.5 34.6   RDW 13.8 13.5   NEPRELIM 13.79*  --    WBC 14.7* 11.0   .0 167.0         Recent Labs   Lab 01/01/25  1023 01/02/25  0422   * 99   BUN 19 26*   CREATSERUM 0.77 0.81   EGFRCR 72 68   CA 9.5 8.2*   * 134*   K 3.6 2.8*   CL 96* 98   CO2 29.0 31.0       No results for input(s): \"ALT\", \"AST\", \"ALB\", \"AMYLASE\", \"LIPASE\", \"LDH\" in the last 168 hours.    Invalid input(s): \"ALPHOS\", \"TBIL\", \"DBIL\", \"TPROT\"      Imaging:  XR CHEST AP PORTABLE  (CPT=71045)    Result Date: 1/1/2025  CONCLUSION:   Retrocardiac air space density which may represent atelectasis versus pneumonia. Short-term followup suggested to ensure resolution.    Dictated by (CST): Fortino Preston MD on 1/01/2025 at 11:38 AM     Finalized by (CST): Kary  MD Fortino on 1/01/2025 at 11:39 AM             Meds:      potassium chloride  20 mEq Intravenous Once    dextromethorphan-guaifenesin ER  1 tablet Oral BID    piperacillin-tazobactam  3.375 g Intravenous Q8H    azithromycin  500 mg Oral Daily    atorvastatin  40 mg Oral Nightly    aspirin  81 mg Oral Daily    gabapentin  300 mg Oral Nightly    loperamide  2 mg Oral QPM    metoprolol succinate ER  50 mg Oral QAM    mycophenolate mofetil  500 mg Oral BID    pantoprazole  40 mg Oral QAM    lipase-protease-amylase (Lip-Prot-Amyl)  40,000 Units Oral TID CC    heparin  5,000 Units Subcutaneous Q8H ANGELA      sodium chloride         acetaminophen    polyethylene glycol (PEG 3350)    sennosides    bisacodyl    fleet enema    ondansetron    metoclopramide

## 2025-01-02 NOTE — PHYSICAL THERAPY NOTE
PHYSICAL THERAPY EVALUATION - INPATIENT     Room Number: 423/423-A  Evaluation Date: 2025  Type of Evaluation: Initial   Physician Order: PT Eval and Treat    Presenting Problem:  (flu/PNA r/o cdiff)     Reason for Therapy: Mobility Dysfunction and Discharge Planning    PHYSICAL THERAPY ASSESSMENT   Patient is a 89 year old female admitted 2025.  Prior to admission, patient's baseline is to walk with walker at home and assist of  for ADL's.  Patient is currently functioning near baseline with gait and stair negotiation.  Patient is requiring moderate assist as a result of the following impairments: decreased endurance/aerobic capacity and medical status.  Physical Therapy will continue to follow for duration of hospitalization.    Patient will benefit from continued skilled PT Services at discharge to promote prior level of function.  Anticipate patient will return home with home health PT.    PLAN DURING HOSPITALIZATION  Nursing Mobility Recommendation : 1 Assist     PT Treatment Plan: Bed mobility;Patient education;Family education;Gait training;Transfer training  Rehab Potential : Fair  Frequency (Obs): 5x/week     PHYSICAL THERAPY MEDICAL/SOCIAL HISTORY   History related to current admission:      Problem List  Principal Problem:    Community acquired pneumonia of left lower lobe of lung  Active Problems:    Hyponatremia    Leukocytosis    Azotemia    Hyperglycemia      HOME SITUATION  Type of Home: Condo  Home Layout: One level                     Lives With: Spouse              Prior Level of [place]: ind with RW at home and assist of  for ADL's    SUBJECTIVE  \"I am tired and sick of this cough\"    PHYSICAL THERAPY EXAMINATION   OBJECTIVE          WEIGHT BEARING RESTRICTION       PAIN ASSESSMENT  Ratin  Location:  (low back ache during coughing)       COGNITION  Overall Cognitive Status:  WFL - within functional limits    RANGE OF MOTION AND STRENGTH ASSESSMENT  Upper extremity  ROM and strength are within functional limits   Lower extremity ROM is within functional limits   Lower extremity strength is within functional limits     BALANCE  Static Sitting: Fair  Dynamic Sitting: Fair  Static Standing: Fair -  Dynamic Standing: Poor +      ACTIVITY TOLERANCE  Pulse: 118        BP: 95/80             O2 WALK  Oxygen Therapy  SPO2% on Oxygen at Rest: 92  At rest oxygen flow (liters per minute): 3    AM-PAC '6-Clicks' INPATIENT SHORT FORM - BASIC MOBILITY  How much difficulty does the patient currently have...  Patient Difficulty: Turning over in bed (including adjusting bedclothes, sheets and blankets)?: A Little   Patient Difficulty: Sitting down on and standing up from a chair with arms (e.g., wheelchair, bedside commode, etc.): A Little   Patient Difficulty: Moving from lying on back to sitting on the side of the bed?: A Little   How much help from another person does the patient currently need...   Help from Another: Moving to and from a bed to a chair (including a wheelchair)?: A Little   Help from Another: Need to walk in hospital room?: A Little   Help from Another: Climbing 3-5 steps with a railing?: A Lot     AM-PAC Score:  Raw Score: 17   Approx Degree of Impairment: 50.57%   Standardized Score (AM-PAC Scale): 42.13   CMS Modifier (G-Code): CK    FUNCTIONAL ABILITY STATUS  Functional Mobility/Gait Assessment  Gait Assistance: Contact guard assist (x2)  Distance (ft):  (12x2)  Assistive Device: Rolling walker  Pattern: Shuffle  Rolling: contact guard assist  Supine to Sit: minimal assist  Sit to Supine: minimal assist  Sit to Stand: contact guard assist    Exercise/Education Provided:  Energy conservation  Gait training  Transfer training    Skilled Therapy Provided: activity pacing, gait training    The patient's Approx Degree of Impairment: 50.57% has been calculated based on documentation in the [facility] '6 clicks' Inpatient Basic Mobility Short Form.  Research supports that patients  with this level of impairment may benefit from HH.  Final disposition will be made by interdisciplinary medical team.    Patient End of Session: Up in chair    CURRENT GOALS  Goals to be met by:   Patient Goal Patient's self-stated goal is: to go home   Goal #1 Patient is able to demonstrate supine - sit EOB @ level: independent     Goal #1   Current Status    Goal #2 Patient is able to demonstrate transfers Sit to/from Stand at assistance level: supervision with none     Goal #2  Current Status    Goal #3 Patient is able to ambulate 50 feet with assist device: walker - rolling at assistance level: supervision   Goal #3   Current Status    Goal #4    Goal #4   Current Status    Goal #5 Patient to demonstrate independence with home activity/exercise instructions provided to patient in preparation for discharge.   Goal #5   Current Status    Goal #6    Goal #6  Current Status      Patient Evaluation Complexity Level:  History Low - no personal factors and/or co-morbidities   Examination of body systems Low -  addressing 1-2 elements   Clinical Presentation Low- Stable   Clinical Decision Making  Low Complexity     Gait Trainin minutes

## 2025-01-02 NOTE — PLAN OF CARE
Alert and oriented x4. Monitoring vital signs- stable at this time. No acute changes noted throughout shift. Receiving IV fluids per MD order. 3L Nasal cannula. Up with 1 assist and a walker. Fall precautions maintained- bed alarm on, bed locked in lowest position, call light and personal belongings within reach, non-skid socks in place to bilateral feet. Frequent rounding by nursing staff.      Problem: Patient Centered Care  Goal: Patient preferences are identified and integrated in the patient's plan of care  Description: Interventions:  - What would you like us to know as we care for you?   - Provide timely, complete, and accurate information to patient/family  - Incorporate patient and family knowledge, values, beliefs, and cultural backgrounds into the planning and delivery of care  - Encourage patient/family to participate in care and decision-making at the level they choose  - Honor patient and family perspectives and choices  Outcome: Progressing     Problem: Patient/Family Goals  Goal: Patient/Family Long Term Goal  Description: Patient's Long Term Goal: to be free of infection.    Interventions:  - iv and oral antibiotics given.  - See additional Care Plan goals for specific interventions  Outcome: Progressing  Goal: Patient/Family Short Term Goal  Description: Patient's Short Term Goal: to have a clear plan for discharge.    Interventions:   - case management initiated.  - See additional Care Plan goals for specific interventions  Outcome: Progressing     Problem: PAIN - ADULT  Goal: Verbalizes/displays adequate comfort level or patient's stated pain goal  Description: INTERVENTIONS:  - Encourage pt to monitor pain and request assistance  - Assess pain using appropriate pain scale  - Administer analgesics based on type and severity of pain and evaluate response  - Implement non-pharmacological measures as appropriate and evaluate response  - Consider cultural and social influences on pain and pain  management  - Manage/alleviate anxiety  - Utilize distraction and/or relaxation techniques  - Monitor for opioid side effects  - Notify MD/LIP if interventions unsuccessful or patient reports new pain  - Anticipate increased pain with activity and pre-medicate as appropriate  Outcome: Progressing     Problem: RISK FOR INFECTION - ADULT  Goal: Absence of fever/infection during anticipated neutropenic period  Description: INTERVENTIONS  - Monitor WBC  - Administer growth factors as ordered  - Implement neutropenic guidelines  Outcome: Progressing     Problem: SAFETY ADULT - FALL  Goal: Free from fall injury  Description: INTERVENTIONS:  - Assess pt frequently for physical needs  - Identify cognitive and physical deficits and behaviors that affect risk of falls.  - [place] fall precautions as indicated by assessment.  - Educate pt/family on patient safety including physical limitations  - Instruct pt to call for assistance with activity based on assessment  - Modify environment to reduce risk of injury  - Provide assistive devices as appropriate  - Consider OT/PT consult to assist with strengthening/mobility  - Encourage toileting schedule  Outcome: Progressing     Problem: DISCHARGE PLANNING  Goal: Discharge to home or other facility with appropriate resources  Description: INTERVENTIONS:  - Identify barriers to discharge w/pt and caregiver  - Include patient/family/discharge partner in discharge planning  - Arrange for needed discharge resources and transportation as appropriate  - Identify discharge learning needs (meds, wound care, etc)  - Arrange for interpreters to assist at discharge as needed  - Consider post-discharge preferences of patient/family/discharge partner  - Complete POLST form as appropriate  - Assess patient's ability to be responsible for managing their own health  - Refer to Case Management Department for coordinating discharge planning if the patient needs post-hospital services based on  physician/LIP order or complex needs related to functional status, cognitive ability or social support system  Outcome: Progressing

## 2025-01-02 NOTE — OCCUPATIONAL THERAPY NOTE
OCCUPATIONAL THERAPY EVALUATION - INPATIENT     Room Number: 423/423-A  Evaluation Date: 1/2/2025  Type of Evaluation: Initial  Presenting Problem: pneumonia,diarrhea    Physician Order: IP Consult to Occupational Therapy  Reason for Therapy: ADL/IADL Dysfunction and Discharge Planning    OCCUPATIONAL THERAPY ASSESSMENT   Patient is a 89 year old female admitted 1/1/2025 for pneumonia,diarrhea.  Prior to admission, patient was living w/ her  at [facility] in an Independent apartment. Pt reports being mod I for ambulation w/ a rollator. Pt's spouse assists w/ adls/showering as needed.Pt ambulates to the dining room for all meals.  Patient is currently functioning below baseline with adls and functional mobility.  Patient is requiring cga/minimal assist as a result of the following impairments: decreased functional strength, decreased endurance, and increased O2 needs from baseline. Occupational Therapy will continue to follow for duration of hospitalization.    Patient will benefit from continued skilled OT Services at discharge to promote prior level of function and safety with additional support and return home with home health OT.    PLAN DURING HOSPITALIZATION  OT Device Recommendations: TBD  OT Treatment Plan: Compensatory technique education;Patient/Family training;Patient/Family education;Endurance training;Functional transfer training;Energy conservation/work simplification techniques;ADL training     OCCUPATIONAL THERAPY MEDICAL/SOCIAL HISTORY   Problem List  Principal Problem:    Community acquired pneumonia of left lower lobe of lung  Active Problems:    Hyponatremia    Leukocytosis    Azotemia    Hyperglycemia    HOME SITUATION  Type of Home: Independent living facility ([facility])  Home Layout: One level  Lives With: Spouse  Toilet and Equipment: Comfort height toilet  Shower/Tub and Equipment: Shower chair  Other Equipment: -- (rollator)  Drives: No  Patient Regularly Uses: None    Stairs  in Home: none  Use of Assistive Device(s): rollator    Prior Level of [place]: Prior to admission, patient was living w/ her  at [facility] in an Independent apartment. Pt reports being mod I for ambulation w/ a rollator. Pt's spouse assists w/ adls/showering as needed.Pt ambulates to the dining room for all meals    SUBJECTIVE  \"I know I need to move so I can get home\"    OCCUPATIONAL THERAPY EXAMINATION      OBJECTIVE  Precautions: Bed/chair alarm (contact,droplet)  Fall Risk: High fall risk    PAIN ASSESSMENT  Rating: -- (pt offering no c/o pain)    ACTIVITY TOLERANCE  fair    O2 SATURATIONS  92% w/ activity on 3L    Behavioral/Emotional/Social: cooperative,receptive to oob activity    RANGE OF MOTION   Upper extremity ROM is within functional limits     STRENGTH ASSESSMENT  Upper extremity strength is within functional limits     ACTIVITIES OF DAILY LIVING ASSESSMENT  AM-PAC ‘6-Clicks’ Inpatient Daily Activity Short Form  How much help from another person does the patient currently need…  -   Putting on and taking off regular lower body clothing?: A Little  -   Bathing (including washing, rinsing, drying)?: A Little  -   Toileting, which includes using toilet, bedpan or urinal? : A Little  -   Putting on and taking off regular upper body clothing?: A Little  -   Taking care of personal grooming such as brushing teeth?: None  -   Eating meals?: None    AM-PAC Score:  Score: 20  Approx Degree of Impairment: 38.32%  Standardized Score (AM-PAC Scale): 42.03  CMS Modifier (G-Code): CJ    FUNCTIONAL ADL ASSESSMENT  Eating: independent  Grooming: setup assist  UB Dressing: min assist  LB Dressing: min assist  Toileting: max assist    Skilled Therapy Provided: OT orders received and chart reviewed. RN contacted prior to start of care. Treatment coordinated w/ PT. Pt agreeable to participation in therapy. Gait belt used during dynamic activity. Pt received in bed, alert and oriented. On initial contact pt  required min a to transfer supine<>sit at eob. Pt required min a to pattie briefs prior to activity. Pt performing sit<>stand bed/chair w/ cga. Pt ambulating in the room w/ rw and cga x 2;pt w/ flexed posture and shuffling gait     At end of session pt remaining up in chair w/ all needs in reach and alarm on. RN aware of pt's status and performance in therapy      EDUCATION PROVIDED  Patient Education : Role of Occupational Therapy; Plan of Care; Functional Transfer Techniques; Fall Prevention; Energy Conservation (pacing strategies)  Patient's Response to Education: Verbalized Understanding    The patient's Approx Degree of Impairment: 38.32% has been calculated based on documentation in the [facility] '6 clicks' Inpatient Daily Activity Short Form.  Research supports that patients with this level of impairment may benefit from return to home w/ HH.  Final disposition will be made by interdisciplinary medical team.     Patient End of Session: Up in chair;Needs met;Call light within reach;RN aware of session/findings;All patient questions and concerns addressed;Alarm set    OT Goals  Patients self stated goal is: to return home     Patient will complete functional transfer with supervision  Comment:     Patient will complete toileting with set up  Comment:     Patient will tolerate standing for 5 minutes in prep for adls with supervision   Comment:    Patient will complete dressing task with set up/supervision  Comment:          Goals  on: 25  Frequency: 3-5x/week    Patient Evaluation Complexity Level:   Occupational Profile/Medical History LOW - Brief history including review of medical or therapy records    Specific performance deficits impacting engagement in ADL/IADL LOW  1 - 3 performance deficits    Client Assessment/Performance Deficits MODERATE - Comorbidities and min to mod modifications of tasks    Clinical Decision Making LOW - Analysis of occupational profile, problem-focused assessments, limited  treatment options    Overall Complexity LOW     Therapeutic Activity: 15 minutes

## 2025-01-03 ENCOUNTER — APPOINTMENT (OUTPATIENT)
Dept: GENERAL RADIOLOGY | Facility: HOSPITAL | Age: OVER 89
End: 2025-01-03
Attending: INTERNAL MEDICINE
Payer: MEDICARE

## 2025-01-03 LAB
ANION GAP SERPL CALC-SCNC: 5 MMOL/L (ref 0–18)
BUN BLD-MCNC: 12 MG/DL (ref 9–23)
BUN/CREAT SERPL: 19.7 (ref 10–20)
CALCIUM BLD-MCNC: 8.5 MG/DL (ref 8.7–10.4)
CHLORIDE SERPL-SCNC: 102 MMOL/L (ref 98–112)
CO2 SERPL-SCNC: 30 MMOL/L (ref 21–32)
CREAT BLD-MCNC: 0.61 MG/DL
DEPRECATED RDW RBC AUTO: 45.5 FL (ref 35.1–46.3)
EGFRCR SERPLBLD CKD-EPI 2021: 84 ML/MIN/1.73M2 (ref 60–?)
ERYTHROCYTE [DISTWIDTH] IN BLOOD BY AUTOMATED COUNT: 13.7 % (ref 11–15)
GLUCOSE BLD-MCNC: 94 MG/DL (ref 70–99)
HCT VFR BLD AUTO: 33.4 %
HGB BLD-MCNC: 11.3 G/DL
MCH RBC QN AUTO: 30.9 PG (ref 26–34)
MCHC RBC AUTO-ENTMCNC: 33.8 G/DL (ref 31–37)
MCV RBC AUTO: 91.3 FL
OSMOLALITY SERPL CALC.SUM OF ELEC: 284 MOSM/KG (ref 275–295)
PLATELET # BLD AUTO: 166 10(3)UL (ref 150–450)
POTASSIUM SERPL-SCNC: 3.4 MMOL/L (ref 3.5–5.1)
RBC # BLD AUTO: 3.66 X10(6)UL
SODIUM SERPL-SCNC: 137 MMOL/L (ref 136–145)
WBC # BLD AUTO: 8.6 X10(3) UL (ref 4–11)

## 2025-01-03 PROCEDURE — 85027 COMPLETE CBC AUTOMATED: CPT | Performed by: INTERNAL MEDICINE

## 2025-01-03 PROCEDURE — 71045 X-RAY EXAM CHEST 1 VIEW: CPT | Performed by: INTERNAL MEDICINE

## 2025-01-03 PROCEDURE — 80048 BASIC METABOLIC PNL TOTAL CA: CPT | Performed by: INTERNAL MEDICINE

## 2025-01-03 RX ORDER — LOPERAMIDE HYDROCHLORIDE 2 MG/1
4 CAPSULE ORAL 3 TIMES DAILY PRN
Status: DISCONTINUED | OUTPATIENT
Start: 2025-01-03 | End: 2025-01-04

## 2025-01-03 RX ORDER — FUROSEMIDE 10 MG/ML
20 INJECTION INTRAMUSCULAR; INTRAVENOUS ONCE
Status: COMPLETED | OUTPATIENT
Start: 2025-01-03 | End: 2025-01-03

## 2025-01-03 RX ORDER — LOPERAMIDE HYDROCHLORIDE 2 MG/1
2 CAPSULE ORAL 3 TIMES DAILY PRN
Status: DISCONTINUED | OUTPATIENT
Start: 2025-01-03 | End: 2025-01-03

## 2025-01-03 NOTE — PHYSICAL THERAPY NOTE
Attempted follow up treatment this AM, chart reviewed, RN approved participation. Pt rec'd in bed reporting feeling cold and clammy, nauseated and like she was going to be sick. RN notified, will re-attempt at later time as schedule allows.

## 2025-01-03 NOTE — PLAN OF CARE
Monitoring vital signs, stable at this time. Encouraging ambulation/ participation for PT/ OT. No acute changes at this moment. Safety and fall precautions in place, bed locked and in lowest position, call light in reach and non skid socks in place. Frequent rounding by nursing staff.      Problem: Patient Centered Care  Goal: Patient preferences are identified and integrated in the patient's plan of care  Description: Interventions:  - What would you like us to know as we care for you?   - Provide timely, complete, and accurate information to patient/family  - Incorporate patient and family knowledge, values, beliefs, and cultural backgrounds into the planning and delivery of care  - Encourage patient/family to participate in care and decision-making at the level they choose  - Honor patient and family perspectives and choices  Outcome: Progressing     Problem: Patient/Family Goals  Goal: Patient/Family Long Term Goal  Description: Patient's Long Term Goal: to be free of infection.    Interventions:  - iv and oral antibiotics given.  - See additional Care Plan goals for specific interventions  Outcome: Progressing  Goal: Patient/Family Short Term Goal  Description: Patient's Short Term Goal: to have a clear plan for discharge.    Interventions:   - case management initiated.  - See additional Care Plan goals for specific interventions  Outcome: Progressing     Problem: PAIN - ADULT  Goal: Verbalizes/displays adequate comfort level or patient's stated pain goal  Description: INTERVENTIONS:  - Encourage pt to monitor pain and request assistance  - Assess pain using appropriate pain scale  - Administer analgesics based on type and severity of pain and evaluate response  - Implement non-pharmacological measures as appropriate and evaluate response  - Consider cultural and social influences on pain and pain management  - Manage/alleviate anxiety  - Utilize distraction and/or relaxation techniques  - Monitor for opioid  side effects  - Notify MD/LIP if interventions unsuccessful or patient reports new pain  - Anticipate increased pain with activity and pre-medicate as appropriate  Outcome: Progressing     Problem: RISK FOR INFECTION - ADULT  Goal: Absence of fever/infection during anticipated neutropenic period  Description: INTERVENTIONS  - Monitor WBC  - Administer growth factors as ordered  - Implement neutropenic guidelines  Outcome: Progressing     Problem: SAFETY ADULT - FALL  Goal: Free from fall injury  Description: INTERVENTIONS:  - Assess pt frequently for physical needs  - Identify cognitive and physical deficits and behaviors that affect risk of falls.  - [place] fall precautions as indicated by assessment.  - Educate pt/family on patient safety including physical limitations  - Instruct pt to call for assistance with activity based on assessment  - Modify environment to reduce risk of injury  - Provide assistive devices as appropriate  - Consider OT/PT consult to assist with strengthening/mobility  - Encourage toileting schedule  Outcome: Progressing     Problem: DISCHARGE PLANNING  Goal: Discharge to home or other facility with appropriate resources  Description: INTERVENTIONS:  - Identify barriers to discharge w/pt and caregiver  - Include patient/family/discharge partner in discharge planning  - Arrange for needed discharge resources and transportation as appropriate  - Identify discharge learning needs (meds, wound care, etc)  - Arrange for interpreters to assist at discharge as needed  - Consider post-discharge preferences of patient/family/discharge partner  - Complete POLST form as appropriate  - Assess patient's ability to be responsible for managing their own health  - Refer to Case Management Department for coordinating discharge planning if the patient needs post-hospital services based on physician/LIP order or complex needs related to functional status, cognitive ability or social support  system  Outcome: Progressing

## 2025-01-03 NOTE — SPIRITUAL CARE NOTE
Spiritual Care Visit Note    Patient Name: Sharda Rai Date of Spiritual Care Visit: 25   : 1932 Primary Dx: Community acquired pneumonia of left lower lobe of lung       Referred By: Referral From: Family;Nurse;Patient    Spiritual Care Taxonomy:    Intended Effects: [place] affirmation    Methods: Offer spiritual/Tenriism support;Encouraging spiritual/Tenriism practices    Interventions: Acknowledge current situation;Acknowledge response to difficult experience;Active listening;Ask guided questions;Ask guided questions about tereza;[place];Provide a Tenriism item(s);Provide hospitality;Explain  role    Visit Type/Summary:     - Spiritual Care: Responded to a request via the on call phone Consulted with RN prior to visit. Offered empathic listening and emotional support. Patient and family expressed appreciation for  visit. Provided support for Patient's spiritual/Tenriism requests. Coordinated [place] Communion and verified NPO status. Offered prayer. Provided Patient with a rosary.  remains available for follow up.    Spiritual Care support can be requested via an Epic consult. For urgent/immediate needs, please contact the On Call  at: [place]: ext [phone]    Chaplain Resident, Joanne Virgen, PhD

## 2025-01-03 NOTE — DIETARY NOTE
ADULT NUTRITION INITIAL ASSESSMENT    Pt is at moderate nutrition risk.  Pt does not meet malnutrition criteria.        RECOMMENDATIONS TO MD: See Nutrition Intervention    ADMITTING DIAGNOSIS:  Community acquired pneumonia of left lower lobe of lung [J18.9]    PERTINENT PAST MEDICAL HISTORY:  has a past medical history of Blepharitis, Dermatochalasis, Dry eye syndrome of both eyes, Essential hypertension, Gallstones, Hyperlipidemia, Macular pucker, bilateral, Myasthenia gravis (HCC), Pancreatic insufficiency (HCC), and Pseudophakia, both eyes.     PATIENT STATUS: Initial 01/03/25: Seeing pt due to screened at risk by RN at admission for unintentional wt loss and eating poorly due to decreased appetite. Pt with hx of pancreatic insufficiency, myasthenia gravis and admitted with pneumonia, influenza and norovirus. Po intake limited due to diarrhea. Imodium increased so diarrhea has slowed today. Pt still hesitant to eat. Discussed nutritional supplements when her diarrhea improves but pt declines due to hx of pancreatic insufficiency--on pancrealipase with meals, but pt reports she will have diarrhea with ensure. Hold off on ONS at this time. Pt reports stable wt for the past few yrs ~135-140#.       FOOD/NUTRITION RELATED HISTORY:  Appetite: Poor, good PTA  Intake:  50 and 75% below, but not ordering much.   Intake Meeting Needs: No    Percent Meals Eaten (last 6 days)       Date/Time Percent Meals Eaten (%)    01/02/25 1404 50 %    01/03/25 0827 75 %           Food Allergies: Shellfish/shellfish-derived products  Cultural/Ethnic/Mormon Preferences: Not Obtained    GASTROINTESTINAL: diarrhea    MEDICATIONS: reviewed   amoxicillin potassium and clavulanate  875 mg Oral Q12H    dextromethorphan-guaifenesin ER  1 tablet Oral BID    azithromycin  500 mg Oral Daily    atorvastatin  40 mg Oral Nightly    aspirin  81 mg Oral Daily    gabapentin  300 mg Oral Nightly    metoprolol succinate ER  50 mg Oral QAM     mycophenolate mofetil  500 mg Oral BID    pantoprazole  40 mg Oral QAM    lipase-protease-amylase (Lip-Prot-Amyl)  40,000 Units Oral TID CC    heparin  5,000 Units Subcutaneous Q8H ANGELA       LABS: reviewed  Recent Labs     01/01/25  1023 01/02/25  0422 01/02/25  1739 01/03/25  0427   * 99  --  94   BUN 19 26*  --  12   CREATSERUM 0.77 0.81  --  0.61   CA 9.5 8.2*  --  8.5*   * 134*  --  137   K 3.6 2.8* 3.9 3.4*   CL 96* 98  --  102   CO2 29.0 31.0  --  30.0   OSMOCALC 282 283  --  284       NUTRITION RELATED PHYSICAL FINDINGS:  - Nutrition Focused Physical Exam (NFPE):  pt with mild wasting per physical exam, but likely due to aging process vs malnutrition. Legs with edema difficult to assess.      - Fluid Accumulation:  LE edema.   see RN documentation for details  - Skin Integrity: intact see RN documentation for details    ANTHROPOMETRICS:  HT: 154.9 cm (5' 1\")  WT: 62.1 kg (137 lb)   BMI: Body mass index is 25.89 kg/m².  BMI CLASSIFICATION: 25-29.9 kg/m2 - overweight  IBW: 105 lbs        130% IBW  Usual Body Wt: 135-140 lbs for the past 3-4 yrs      100% UBW    WEIGHT HISTORY:  Patient Weight(s) for the past 336 hrs:   Weight   01/01/25 1016 62.1 kg (137 lb)     Wt Readings from Last 10 Encounters:   01/01/25 62.1 kg (137 lb)   11/01/24 64.6 kg (142 lb 6.4 oz)   10/18/24 63.8 kg (140 lb 9.6 oz)   2016 176# from old records.     NUTRITION DIAGNOSIS/PROBLEM:    Inadequate oral intake related to Decreased ability to consume sufficient energy in the setting of respiratory illness and gastrointestinal illness as evidenced by reports of negligible po intake since admission and for a couple days PTA.      NUTRITION INTERVENTION:     NUTRITION PRESCRIPTION:   Estimated Nutrition needs: --dosing wt of 62 kg - wt taken on 1/1  Calories: 3310-0844 calories/day (25-28 calories per kg Dosing wt)  Protein: 74 g protein/day (1.2 g protein/kg Dosing wt)    - Diet:       Procedures    Regular/General diet Is  Patient on Accuchecks? No      - RD Care Plan: Encouraged increased PO intake and monitor acceptance of ONS if needed.     - Meals and snacks: Encouraged increased PO intake  - Medical Food Supplements-RD added Patient declined--Rational/use of oral supplements discussed.  - Vitamin and mineral supplements: none  - Feeding assistance: meal set up  - Nutrition education: Discussed importance of adequate energy and protein intake   - Coordination of nutrition care: collaboration with other providers  - Discharge and transfer of nutrition care to new setting or provider: to be determined    MONITOR AND EVALUATE/NUTRITION GOALS:  - Food and Nutrient Intake:      Monitor: adequacy of PO intake and tolerance of PO intake  - Food and Nutrient Administration:      Monitor: N/A  - Anthropometric Measurement:    Monitor weight  - Nutrition Goals:      maintain wt within 5%, PO greater than 75% of meals, labs within acceptable limits, support body systems, and improved GI status    DIETITIAN FOLLOW UP: RD to follow and monitor nutrition status      Helen Wu RD, LDN   Clinical Dietitian [phone]

## 2025-01-03 NOTE — PLAN OF CARE
Patient is alert and oriented. 3L NC. Voiding freely, using purewick. IVF infusing. IV abx. Call light within reach, bed alarm active.   Problem: Patient Centered Care  Goal: Patient preferences are identified and integrated in the patient's plan of care  Description: Interventions:  - Provide timely, complete, and accurate information to patient/family  - Incorporate patient and family knowledge, values, beliefs, and cultural backgrounds into the planning and delivery of care  - Encourage patient/family to participate in care and decision-making at the level they choose  - Honor patient and family perspectives and choices  Outcome: Progressing     Problem: PAIN - ADULT  Goal: Verbalizes/displays adequate comfort level or patient's stated pain goal  Description: INTERVENTIONS:  - Encourage pt to monitor pain and request assistance  - Assess pain using appropriate pain scale  - Administer analgesics based on type and severity of pain and evaluate response  - Implement non-pharmacological measures as appropriate and evaluate response  - Consider cultural and social influences on pain and pain management  - Manage/alleviate anxiety  - Utilize distraction and/or relaxation techniques  - Monitor for opioid side effects  - Notify MD/LIP if interventions unsuccessful or patient reports new pain  - Anticipate increased pain with activity and pre-medicate as appropriate  Outcome: Progressing     Problem: RISK FOR INFECTION - ADULT  Goal: Absence of fever/infection during anticipated neutropenic period  Description: INTERVENTIONS  - Monitor WBC  - Administer growth factors as ordered  - Implement neutropenic guidelines  Outcome: Progressing     Problem: SAFETY ADULT - FALL  Goal: Free from fall injury  Description: INTERVENTIONS:  - Assess pt frequently for physical needs  - Identify cognitive and physical deficits and behaviors that affect risk of falls.  - [place] fall precautions as indicated by assessment.  - Educate  pt/family on patient safety including physical limitations  - Instruct pt to call for assistance with activity based on assessment  - Modify environment to reduce risk of injury  - Provide assistive devices as appropriate  - Consider OT/PT consult to assist with strengthening/mobility  - Encourage toileting schedule  Outcome: Progressing     Problem: DISCHARGE PLANNING  Goal: Discharge to home or other facility with appropriate resources  Description: INTERVENTIONS:  - Identify barriers to discharge w/pt and caregiver  - Include patient/family/discharge partner in discharge planning  - Arrange for needed discharge resources and transportation as appropriate  - Identify discharge learning needs (meds, wound care, etc)  - Arrange for interpreters to assist at discharge as needed  - Consider post-discharge preferences of patient/family/discharge partner  - Complete POLST form as appropriate  - Assess patient's ability to be responsible for managing their own health  - Refer to Case Management Department for coordinating discharge planning if the patient needs post-hospital services based on physician/LIP order or complex needs related to functional status, cognitive ability or social support system  Outcome: Progressing

## 2025-01-03 NOTE — PROGRESS NOTES
[facility] Hospitalist Progress Note     CC: Hospital Follow up    PCP: No primary care provider on file.       Assessment/Plan:     Principal Problem:    Community acquired pneumonia of left lower lobe of lung  Active Problems:    Hyponatremia    Leukocytosis    Azotemia    Hyperglycemia    Patient is a 89 year old female with PMH hypertension, hyperlipidemia, pancreatic insufficiency, myasthenia gravis who presents with approximately 7 to 9 days of increasing cough and congestion difficult time expelling sputum things took a turn for the worse on Monday where she developed chills and increasing fatigue felt very tired.  Ultimately family brought her to the hospital for further evaluation she was found to have retrocardiac airspace density consistent with pneumonia.      Community-acquired pneumonia retrocardiac infiltrate noted presumed bacterial  -Will change antibiotics to Augmentin as she is having some diarrhea, added imodium PRN as well TID   Repeat CXR with worsening edema in right side, will dose 20 mg IV lasix x 1   -Also with parainfluenza   -States cough is now productive but she is feeling somewhat better today   Still tired, worried about diarrhea   -Legionella negative  - mycoplasma pending   -Started dextromethorphan guaifenesin scheduled given increased sputum viscosity  -Trend CBC- WBC improved - trend    Wean O2   IS   Up in bed     2.  Myasthenia gravis without acute flare  -Continue home mycophenolate     3.  Mild hyponatremia-resolved   PO intake is still low but given CXR results will give 20 mg IV lasix x 1   Hypokalemia likely from emesis and poor PO intake - trend and replace as needed    4.  Hyperlipidemia  -Continue home statin     5.  Essential hypertension  Continue home metoprolol     6.  Chronic weakness PT OT eval     7.  Chronic diarrhea resume home Imodium and pancrelipase  Added imodium TID PRN    Worsened inpatient   GI panel + for norovirus  Increased imodium to TID  PRN      FN:  - IVF:None  - Diet:General diet      DVT Prophy:Heparin TID   Lines:PIV      Questions/concerns were discussed with patient and/or family by bedside.    Thank You,  Hugo Lira,     Hospitalist with [facility]     Subjective:     Feels breathing is improved, some diarrhea today, feels tired but less coughing, no chest pains, some mild abd pain     OBJECTIVE:    Blood pressure 123/54, pulse 87, temperature 98 °F (36.7 °C), temperature source Temporal, resp. rate 18, height 5' 1\" (1.549 m), weight 137 lb (62.1 kg), SpO2 92%.    Temp:  [97.7 °F (36.5 °C)-99.1 °F (37.3 °C)] 98 °F (36.7 °C)  Pulse:  [77-95] 87  Resp:  [18] 18  BP: (112-128)/(54-66) 123/54  SpO2:  [92 %-94 %] 92 %      Intake/Output:    Intake/Output Summary (Last 24 hours) at 1/3/2025 0940  Last data filed at 1/3/2025 0900  Gross per 24 hour   Intake 212 ml   Output 450 ml   Net -238 ml       Last 3 Weights   01/01/25 1016 137 lb (62.1 kg)   11/01/24 0913 142 lb 6.4 oz (64.6 kg)   10/18/24 2156 140 lb 9.6 oz (63.8 kg)   10/18/24 1840 140 lb (63.5 kg)       Exam   Gen: No acute distress, alert and oriented x3  Heent: NC AT, neck supple  Pulm: Congestion in both lungs worse on right , no wheezes   CV: Heart with regular rate and rhythm, no peripheral edema  Abd: Abdomen soft, nontender, nondistended,       Data Review:       Labs:     Recent Labs   Lab 01/01/25  1023 01/02/25  0422 01/03/25  0427   RBC 4.59 3.59* 3.66*   HGB 13.6 11.2* 11.3*   HCT 41.9 32.4* 33.4*   MCV 91.3 90.3 91.3   MCH 29.6 31.2 30.9   MCHC 32.5 34.6 33.8   RDW 13.8 13.5 13.7   NEPRELIM 13.79*  --   --    WBC 14.7* 11.0 8.6   .0 167.0 166.0         Recent Labs   Lab 01/01/25  1023 01/02/25  0422 01/02/25  1739 01/03/25 0427   * 99  --  94   BUN 19 26*  --  12   CREATSERUM 0.77 0.81  --  0.61   EGFRCR 72 68  --  84   CA 9.5 8.2*  --  8.5*   * 134*  --  137   K 3.6 2.8* 3.9 3.4*   CL 96* 98  --  102   CO2 29.0 31.0  --  30.0       No  results for input(s): \"ALT\", \"AST\", \"ALB\", \"AMYLASE\", \"LIPASE\", \"LDH\" in the last 168 hours.    Invalid input(s): \"ALPHOS\", \"TBIL\", \"DBIL\", \"TPROT\"      Imaging:  XR CHEST AP PORTABLE  (CPT=71045)    Result Date: 1/1/2025  CONCLUSION:   Retrocardiac air space density which may represent atelectasis versus pneumonia. Short-term followup suggested to ensure resolution.    Dictated by (CST): Fortino Preston MD on 1/01/2025 at 11:38 AM     Finalized by (CST): Fortino Preston MD on 1/01/2025 at 11:39 AM             Meds:      potassium chloride  40 mEq Intravenous Once    amoxicillin potassium and clavulanate  875 mg Oral Q12H    furosemide  20 mg Intravenous Once    dextromethorphan-guaifenesin ER  1 tablet Oral BID    azithromycin  500 mg Oral Daily    atorvastatin  40 mg Oral Nightly    aspirin  81 mg Oral Daily    gabapentin  300 mg Oral Nightly    metoprolol succinate ER  50 mg Oral QAM    mycophenolate mofetil  500 mg Oral BID    pantoprazole  40 mg Oral QAM    lipase-protease-amylase (Lip-Prot-Amyl)  40,000 Units Oral TID CC    heparin  5,000 Units Subcutaneous Q8H ANGELA           loperamide    acetaminophen    polyethylene glycol (PEG 3350)    sennosides    bisacodyl    fleet enema    ondansetron    metoclopramide

## 2025-01-04 ENCOUNTER — APPOINTMENT (OUTPATIENT)
Dept: CT IMAGING | Facility: HOSPITAL | Age: OVER 89
End: 2025-01-04
Attending: INTERNAL MEDICINE
Payer: MEDICARE

## 2025-01-04 ENCOUNTER — APPOINTMENT (OUTPATIENT)
Dept: GENERAL RADIOLOGY | Facility: HOSPITAL | Age: OVER 89
End: 2025-01-04
Attending: INTERNAL MEDICINE
Payer: MEDICARE

## 2025-01-04 LAB
ANION GAP SERPL CALC-SCNC: 7 MMOL/L (ref 0–18)
BUN BLD-MCNC: 14 MG/DL (ref 9–23)
BUN/CREAT SERPL: 22.6 (ref 10–20)
CALCIUM BLD-MCNC: 8.8 MG/DL (ref 8.7–10.4)
CHLORIDE SERPL-SCNC: 103 MMOL/L (ref 98–112)
CO2 SERPL-SCNC: 29 MMOL/L (ref 21–32)
CREAT BLD-MCNC: 0.62 MG/DL
DEPRECATED RDW RBC AUTO: 45.3 FL (ref 35.1–46.3)
EGFRCR SERPLBLD CKD-EPI 2021: 83 ML/MIN/1.73M2 (ref 60–?)
ERYTHROCYTE [DISTWIDTH] IN BLOOD BY AUTOMATED COUNT: 13.6 % (ref 11–15)
GLUCOSE BLD-MCNC: 68 MG/DL (ref 70–99)
GLUCOSE BLDC GLUCOMTR-MCNC: 72 MG/DL (ref 70–99)
GLUCOSE BLDC GLUCOMTR-MCNC: 73 MG/DL (ref 70–99)
HCT VFR BLD AUTO: 34.2 %
HGB BLD-MCNC: 11.8 G/DL
MCH RBC QN AUTO: 31.5 PG (ref 26–34)
MCHC RBC AUTO-ENTMCNC: 34.5 G/DL (ref 31–37)
MCV RBC AUTO: 91.2 FL
OSMOLALITY SERPL CALC.SUM OF ELEC: 287 MOSM/KG (ref 275–295)
PLATELET # BLD AUTO: 185 10(3)UL (ref 150–450)
POTASSIUM SERPL-SCNC: 3.4 MMOL/L (ref 3.5–5.1)
POTASSIUM SERPL-SCNC: 3.4 MMOL/L (ref 3.5–5.1)
RBC # BLD AUTO: 3.75 X10(6)UL
SODIUM SERPL-SCNC: 139 MMOL/L (ref 136–145)
WBC # BLD AUTO: 8.7 X10(3) UL (ref 4–11)

## 2025-01-04 PROCEDURE — 97116 GAIT TRAINING THERAPY: CPT

## 2025-01-04 PROCEDURE — 85027 COMPLETE CBC AUTOMATED: CPT | Performed by: INTERNAL MEDICINE

## 2025-01-04 PROCEDURE — 74018 RADEX ABDOMEN 1 VIEW: CPT | Performed by: INTERNAL MEDICINE

## 2025-01-04 PROCEDURE — 74176 CT ABD & PELVIS W/O CONTRAST: CPT | Performed by: INTERNAL MEDICINE

## 2025-01-04 PROCEDURE — 84132 ASSAY OF SERUM POTASSIUM: CPT | Performed by: INTERNAL MEDICINE

## 2025-01-04 PROCEDURE — 97530 THERAPEUTIC ACTIVITIES: CPT

## 2025-01-04 PROCEDURE — 82962 GLUCOSE BLOOD TEST: CPT

## 2025-01-04 PROCEDURE — 80048 BASIC METABOLIC PNL TOTAL CA: CPT | Performed by: INTERNAL MEDICINE

## 2025-01-04 RX ORDER — DEXTROSE MONOHYDRATE, SODIUM CHLORIDE, AND POTASSIUM CHLORIDE 50; 1.49; 9 G/1000ML; G/1000ML; G/1000ML
INJECTION, SOLUTION INTRAVENOUS CONTINUOUS
Status: DISCONTINUED | OUTPATIENT
Start: 2025-01-04 | End: 2025-01-07

## 2025-01-04 RX ORDER — METOCLOPRAMIDE HYDROCHLORIDE 5 MG/ML
5 INJECTION INTRAMUSCULAR; INTRAVENOUS 3 TIMES DAILY
Status: DISCONTINUED | OUTPATIENT
Start: 2025-01-04 | End: 2025-01-06

## 2025-01-04 RX ORDER — POTASSIUM CHLORIDE 1.5 G/1.58G
40 POWDER, FOR SOLUTION ORAL ONCE
Status: COMPLETED | OUTPATIENT
Start: 2025-01-04 | End: 2025-01-04

## 2025-01-04 NOTE — PLAN OF CARE
Monitoring vital signs, stable at this time. Encouraging ambulation/ participation for PT/ OT. No acute changes at this moment. Safety and fall precautions in place, bed locked and in lowest position, call light in reach and non skid socks in place. Frequent rounding by nursing staff.      Problem: Patient Centered Care  Goal: Patient preferences are identified and integrated in the patient's plan of care  Description: Interventions:  - What would you like us to know as we care for you?   - Provide timely, complete, and accurate information to patient/family  - Incorporate patient and family knowledge, values, beliefs, and cultural backgrounds into the planning and delivery of care  - Encourage patient/family to participate in care and decision-making at the level they choose  - Honor patient and family perspectives and choices  Outcome: Progressing     Problem: Patient/Family Goals  Goal: Patient/Family Long Term Goal  Description: Patient's Long Term Goal: to be free of infection.    Interventions:  - iv and oral antibiotics given.  - See additional Care Plan goals for specific interventions  Outcome: Progressing  Goal: Patient/Family Short Term Goal  Description: Patient's Short Term Goal: to have a clear plan for discharge.    Interventions:   - case management initiated.  - See additional Care Plan goals for specific interventions  Outcome: Progressing     Problem: PAIN - ADULT  Goal: Verbalizes/displays adequate comfort level or patient's stated pain goal  Description: INTERVENTIONS:  - Encourage pt to monitor pain and request assistance  - Assess pain using appropriate pain scale  - Administer analgesics based on type and severity of pain and evaluate response  - Implement non-pharmacological measures as appropriate and evaluate response  - Consider cultural and social influences on pain and pain management  - Manage/alleviate anxiety  - Utilize distraction and/or relaxation techniques  - Monitor for opioid  side effects  - Notify MD/LIP if interventions unsuccessful or patient reports new pain  - Anticipate increased pain with activity and pre-medicate as appropriate  Outcome: Progressing     Problem: RISK FOR INFECTION - ADULT  Goal: Absence of fever/infection during anticipated neutropenic period  Description: INTERVENTIONS  - Monitor WBC  - Administer growth factors as ordered  - Implement neutropenic guidelines  Outcome: Progressing     Problem: SAFETY ADULT - FALL  Goal: Free from fall injury  Description: INTERVENTIONS:  - Assess pt frequently for physical needs  - Identify cognitive and physical deficits and behaviors that affect risk of falls.  - [place] fall precautions as indicated by assessment.  - Educate pt/family on patient safety including physical limitations  - Instruct pt to call for assistance with activity based on assessment  - Modify environment to reduce risk of injury  - Provide assistive devices as appropriate  - Consider OT/PT consult to assist with strengthening/mobility  - Encourage toileting schedule  Outcome: Progressing     Problem: DISCHARGE PLANNING  Goal: Discharge to home or other facility with appropriate resources  Description: INTERVENTIONS:  - Identify barriers to discharge w/pt and caregiver  - Include patient/family/discharge partner in discharge planning  - Arrange for needed discharge resources and transportation as appropriate  - Identify discharge learning needs (meds, wound care, etc)  - Arrange for interpreters to assist at discharge as needed  - Consider post-discharge preferences of patient/family/discharge partner  - Complete POLST form as appropriate  - Assess patient's ability to be responsible for managing their own health  - Refer to Case Management Department for coordinating discharge planning if the patient needs post-hospital services based on physician/LIP order or complex needs related to functional status, cognitive ability or social support  system  Outcome: Progressing

## 2025-01-04 NOTE — PROGRESS NOTES
[facility] Hospitalist Progress Note     CC: Hospital Follow up    PCP: No primary care provider on file.       Assessment/Plan:     Principal Problem:    Community acquired pneumonia of left lower lobe of lung  Active Problems:    Hyponatremia    Leukocytosis    Azotemia    Hyperglycemia    Patient is a 89 year old female with PMH Hypertension, Hyperlipidemia, Pancreatic insufficiency, Myasthenia Gravis who presents with approximately 7 to 9 days of increasing URI symptoms, chills and fatigue.  Admitted with PNA and positive for parainfluenza and norovirus.     Community-acquired pneumonia   Parainfluenza   - retrocardiac infiltrate noted on CXR, presumed superimposed bacterial  - Augmentin   - Repeat CXR with worsening edema in right side, IV lasix x 1   - Legionella negative  - mycoplasma pending   - Started dextromethorphan guaifenesin scheduled given increased sputum viscosity  - Trend wbc and fevers   - Wean O2 in 3L, IS, Up to chair     Myasthenia gravis without acute flare  - Continue home mycophenolate     Mild hyponatremia - resolved   Hypokalemia likely from emesis and poor PO intake - trend and replace as needed  - PO intake is still low with low glucose   - maintenance fluids started     Nausea   Abdominal distention   - stop imodium   - no BM today  - will check KUB and keep NPO  - try to ambulate     Hyperlipidemia  - Continue home statin     Essential hypertension  - Continue home metoprolol     Chronic weakness PT/OT eval     Chronic diarrhea   - resume pancrelipase  - Worsened inpatient   - GI panel + for norovirus  - stop imodium      FN:  - IVF: None  - Diet: General diet      DVT Prophy:Heparin TID   Lines:PIV    Questions/concerns were discussed with patient and/or family by bedside.    Thank You,  Angel Abernathy MD     Hospitalist with [facility]     Subjective:     Feels breathing is about the same  No BM today and now feels distended   feels tired but less coughing, no  chest pains  Has not ambulated today     OBJECTIVE:    Blood pressure 132/59, pulse 90, temperature 98 °F (36.7 °C), temperature source Oral, resp. rate 18, height 5' 1\" (1.549 m), weight 137 lb (62.1 kg), SpO2 92%.    Temp:  [97.5 °F (36.4 °C)-98 °F (36.7 °C)] 98 °F (36.7 °C)  Pulse:  [79-90] 90  Resp:  [16-18] 18  BP: (123-135)/(59-63) 132/59  SpO2:  [92 %-94 %] 92 %      Intake/Output:    Intake/Output Summary (Last 24 hours) at 1/4/2025 1205  Last data filed at 1/4/2025 0454  Gross per 24 hour   Intake --   Output 300 ml   Net -300 ml       Last 3 Weights   01/03/25 2359 137 lb (62.1 kg)   01/01/25 1016 137 lb (62.1 kg)   11/01/24 0913 142 lb 6.4 oz (64.6 kg)   10/18/24 2156 140 lb 9.6 oz (63.8 kg)   10/18/24 1840 140 lb (63.5 kg)       Exam   Gen: No acute distress, alert and oriented x3  Heent: NC AT, neck supple  Pulm: Congestion in both lungs worse on right , no wheezes   CV: Heart with regular rate and rhythm, no peripheral edema  Abd: Abdomen soft, distended  Ext: b/l mild edema     Data Review:       Labs:     Recent Labs   Lab 01/01/25  1023 01/02/25  0422 01/03/25  0427 01/04/25  0450   RBC 4.59 3.59* 3.66* 3.75*   HGB 13.6 11.2* 11.3* 11.8*   HCT 41.9 32.4* 33.4* 34.2*   MCV 91.3 90.3 91.3 91.2   MCH 29.6 31.2 30.9 31.5   MCHC 32.5 34.6 33.8 34.5   RDW 13.8 13.5 13.7 13.6   NEPRELIM 13.79*  --   --   --    WBC 14.7* 11.0 8.6 8.7   .0 167.0 166.0 185.0         Recent Labs   Lab 01/02/25  0422 01/02/25  1739 01/03/25  0427 01/04/25  0450   GLU 99  --  94 68*   BUN 26*  --  12 14   CREATSERUM 0.81  --  0.61 0.62   EGFRCR 68  --  84 83   CA 8.2*  --  8.5* 8.8   *  --  137 139   K 2.8* 3.9 3.4* 3.4*  3.4*   CL 98  --  102 103   CO2 31.0  --  30.0 29.0       No results for input(s): \"ALT\", \"AST\", \"ALB\", \"AMYLASE\", \"LIPASE\", \"LDH\" in the last 168 hours.    Invalid input(s): \"ALPHOS\", \"TBIL\", \"DBIL\", \"TPROT\"      Imaging:  No results found.      Meds:      amoxicillin potassium and clavulanate   875 mg Oral Q12H    dextromethorphan-guaifenesin ER  1 tablet Oral BID    azithromycin  500 mg Oral Daily    atorvastatin  40 mg Oral Nightly    aspirin  81 mg Oral Daily    gabapentin  300 mg Oral Nightly    metoprolol succinate ER  50 mg Oral QAM    mycophenolate mofetil  500 mg Oral BID    pantoprazole  40 mg Oral QAM    lipase-protease-amylase (Lip-Prot-Amyl)  40,000 Units Oral TID CC    heparin  5,000 Units Subcutaneous Q8H ANGELA      potassium chloride in dextrose 5%-sodium chloride 0.9% 50 mL/hr at 01/04/25 0859         acetaminophen    polyethylene glycol (PEG 3350)    sennosides    bisacodyl    fleet enema    ondansetron    metoclopramide

## 2025-01-04 NOTE — PLAN OF CARE
Patient Aox4 on 3L NC. 1x walker. Refusing Hep subcutaneous. Voiding via the purewick. VSS. Plan for Cx Xray then poss Rehab?    0630: Patient low BG 68 - MD Michaud notified - Orange juice and gregoria craker given     0649: Recheck BG 73 - repeated treatment, added pudding      Problem: Patient Centered Care  Goal: Patient preferences are identified and integrated in the patient's plan of care  Description: Interventions:  - What would you like us to know as we care for you?   - Provide timely, complete, and accurate information to patient/family  - Incorporate patient and family knowledge, values, beliefs, and cultural backgrounds into the planning and delivery of care  - Encourage patient/family to participate in care and decision-making at the level they choose  - Honor patient and family perspectives and choices  Outcome: Progressing     Problem: PAIN - ADULT  Goal: Verbalizes/displays adequate comfort level or patient's stated pain goal  Description: INTERVENTIONS:  - Encourage pt to monitor pain and request assistance  - Assess pain using appropriate pain scale  - Administer analgesics based on type and severity of pain and evaluate response  - Implement non-pharmacological measures as appropriate and evaluate response  - Consider cultural and social influences on pain and pain management  - Manage/alleviate anxiety  - Utilize distraction and/or relaxation techniques  - Monitor for opioid side effects  - Notify MD/LIP if interventions unsuccessful or patient reports new pain  - Anticipate increased pain with activity and pre-medicate as appropriate  Outcome: Progressing     Problem: RISK FOR INFECTION - ADULT  Goal: Absence of fever/infection during anticipated neutropenic period  Description: INTERVENTIONS  - Monitor WBC  - Administer growth factors as ordered  - Implement neutropenic guidelines  Outcome: Progressing     Problem: SAFETY ADULT - FALL  Goal: Free from fall injury  Description: INTERVENTIONS:  -  Assess pt frequently for physical needs  - Identify cognitive and physical deficits and behaviors that affect risk of falls.  - [place] fall precautions as indicated by assessment.  - Educate pt/family on patient safety including physical limitations  - Instruct pt to call for assistance with activity based on assessment  - Modify environment to reduce risk of injury  - Provide assistive devices as appropriate  - Consider OT/PT consult to assist with strengthening/mobility  - Encourage toileting schedule  Outcome: Progressing     Problem: DISCHARGE PLANNING  Goal: Discharge to home or other facility with appropriate resources  Description: INTERVENTIONS:  - Identify barriers to discharge w/pt and caregiver  - Include patient/family/discharge partner in discharge planning  - Arrange for needed discharge resources and transportation as appropriate  - Identify discharge learning needs (meds, wound care, etc)  - Arrange for interpreters to assist at discharge as needed  - Consider post-discharge preferences of patient/family/discharge partner  - Complete POLST form as appropriate  - Assess patient's ability to be responsible for managing their own health  - Refer to Case Management Department for coordinating discharge planning if the patient needs post-hospital services based on physician/LIP order or complex needs related to functional status, cognitive ability or social support system  Outcome: Progressing

## 2025-01-04 NOTE — PHYSICAL THERAPY NOTE
PHYSICAL THERAPY TREATMENT NOTE - INPATIENT     Room Number: 423/423-A       Presenting Problem:  (flu/PNA r/o cdiff)  Co-Morbidities : htn,myasthenia gravis    Problem List  Principal Problem:    Community acquired pneumonia of left lower lobe of lung  Active Problems:    Hyponatremia    Leukocytosis    Azotemia    Hyperglycemia      PHYSICAL THERAPY ASSESSMENT   Patient demonstrates limited progress this session, goals  remain in progress.      Patient is requiring minimal assist as a result of the following impairments: decreased functional strength, decreased endurance/aerobic capacity, impaired standing balance, decreased muscular endurance, medical status, and increased O2 needs from baseline.     Patient continues to function below baseline with bed mobility, transfers, gait, standing prolonged periods, and performing household tasks.  Next session anticipate patient to progress bed mobility, transfers, and gait.  Physical Therapy will continue to follow patient for duration of hospitalization.    Patient continues to benefit from continued skilled PT services: to promote return to prior level of function and safety with continuous assistance and gradual rehabilitative therapy .    PLAN DURING HOSPITALIZATION  Nursing Mobility Recommendation : 1 Assist     PT Treatment Plan: Endurance;Gait training;Body mechanics;Coordination;Transfer training;Balance training  Frequency (Obs): 5x/week     SUBJECTIVE  I feel very weak.    OBJECTIVE  Precautions: Bed/chair alarm    WEIGHT BEARING RESTRICTION       PAIN ASSESSMENT   Ratin  Location:  (low back ache during coughing)       BALANCE  Static Sitting: Fair +  Dynamic Sitting: Fair  Static Standing: Fair -  Dynamic Standing: Poor +    ACTIVITY TOLERANCE  Pulse: 90  Heart Rate Source: Monitor  Resp: 18  BP: 132/59  BP Location: Right arm  BP Method: Automatic  Patient Position: Semi-Roldan     O2 WALK  Oxygen Therapy  SPO2% on Oxygen at Rest: 93  At rest oxygen  flow (liters per minute): 3    AM-PAC '6-Clicks' INPATIENT SHORT FORM - BASIC MOBILITY  How much difficulty does the patient currently have...  Patient Difficulty: Turning over in bed (including adjusting bedclothes, sheets and blankets)?: A Little   Patient Difficulty: Sitting down on and standing up from a chair with arms (e.g., wheelchair, bedside commode, etc.): A Little   Patient Difficulty: Moving from lying on back to sitting on the side of the bed?: A Little   How much help from another person does the patient currently need...   Help from Another: Moving to and from a bed to a chair (including a wheelchair)?: A Little   Help from Another: Need to walk in hospital room?: A Little   Help from Another: Climbing 3-5 steps with a railing?: A Lot     AM-PAC Score:  Raw Score: 17   Approx Degree of Impairment: 50.57%   Standardized Score (AM-PAC Scale): 42.13   CMS Modifier (G-Code): CK    FUNCTIONAL ABILITY STATUS  Functional Mobility/Gait Assessment  Gait Assistance: Minimum assistance  Distance (ft): 5 ft side stepping  Assistive Device: Rolling walker  Pattern: Shuffle  Rolling: minimal assist  Supine to Sit: minimal assist  Sit to Supine: minimal assist  Sit to Stand: minimal assist    Skilled Therapy Provided: patient on cont isolation + droplet. Chart reviewed and RN approved session. Patient agreeable for therapy activity. Patient tc/o of abd discomfort having diarrhea. Patient able to participate with bed mobility, transfers and standing activity with breaks between d/t inc weakness.     The patient's Approx Degree of Impairment: 50.57% has been calculated based on documentation in the [facility] '6 clicks' Inpatient Daily Activity Short Form.  Research supports that patients with this level of impairment may benefit from GR.  Final disposition will be made by interdisciplinary medical team.    THERAPEUTIC EXERCISES  Lower Extremity Alternating marching  Ankle pumps  Knee extension     Position Sitting and  Supine       Patient End of Session: In bed;Alarm set;Hospital anti-slip socks;All patient questions and concerns addressed;RN aware of session/findings;Call light within reach;Needs met    CURRENT GOALS   oals to be met by:   Patient Goal Patient's self-stated goal is: to go home   Goal #1 Patient is able to demonstrate supine - sit EOB @ level: independent     Goal #1   Current Status Min A   Goal #2 Patient is able to demonstrate transfers Sit to/from Stand at assistance level: supervision with none     Goal #2  Current Status Min A   Goal #3 Patient is able to ambulate 50 feet with assist device: walker - rolling at assistance level: supervision   Goal #3   Current Status 5ft RW Min A   Goal #4    Goal #4   Current Status    Goal #5 Patient to demonstrate independence with home activity/exercise instructions provided to patient in preparation for discharge.   Goal #5   Current Status instructed   Goal #6    Goal #6  Current Status      Gait Trainin minutes  Therapeutic Activity: 15 minutes  Neuromuscular Re-education:  minutes  Therapeutic Exercise:  minutes  [place] Repositioning:  minutes  Manual Therapy:  minutes  Can add/delete any of these

## 2025-01-05 LAB
ANION GAP SERPL CALC-SCNC: 6 MMOL/L (ref 0–18)
BASOPHILS # BLD AUTO: 0.03 X10(3) UL (ref 0–0.2)
BASOPHILS NFR BLD AUTO: 0.4 %
BUN BLD-MCNC: 11 MG/DL (ref 9–23)
BUN/CREAT SERPL: 19.6 (ref 10–20)
CALCIUM BLD-MCNC: 8.8 MG/DL (ref 8.7–10.4)
CHLORIDE SERPL-SCNC: 104 MMOL/L (ref 98–112)
CO2 SERPL-SCNC: 32 MMOL/L (ref 21–32)
CREAT BLD-MCNC: 0.56 MG/DL
DEPRECATED RDW RBC AUTO: 45.3 FL (ref 35.1–46.3)
EGFRCR SERPLBLD CKD-EPI 2021: 86 ML/MIN/1.73M2 (ref 60–?)
EOSINOPHIL # BLD AUTO: 0.25 X10(3) UL (ref 0–0.7)
EOSINOPHIL NFR BLD AUTO: 3.7 %
ERYTHROCYTE [DISTWIDTH] IN BLOOD BY AUTOMATED COUNT: 13.5 % (ref 11–15)
GLUCOSE BLD-MCNC: 117 MG/DL (ref 70–99)
HCT VFR BLD AUTO: 34.1 %
HGB BLD-MCNC: 11.7 G/DL
IMM GRANULOCYTES # BLD AUTO: 0.04 X10(3) UL (ref 0–1)
IMM GRANULOCYTES NFR BLD: 0.6 %
LYMPHOCYTES # BLD AUTO: 1.17 X10(3) UL (ref 1–4)
LYMPHOCYTES NFR BLD AUTO: 17.1 %
MAGNESIUM SERPL-MCNC: 1.6 MG/DL (ref 1.6–2.6)
MCH RBC QN AUTO: 31.5 PG (ref 26–34)
MCHC RBC AUTO-ENTMCNC: 34.3 G/DL (ref 31–37)
MCV RBC AUTO: 91.7 FL
MONOCYTES # BLD AUTO: 0.54 X10(3) UL (ref 0.1–1)
MONOCYTES NFR BLD AUTO: 7.9 %
NEUTROPHILS # BLD AUTO: 4.81 X10 (3) UL (ref 1.5–7.7)
NEUTROPHILS # BLD AUTO: 4.81 X10(3) UL (ref 1.5–7.7)
NEUTROPHILS NFR BLD AUTO: 70.3 %
OSMOLALITY SERPL CALC.SUM OF ELEC: 294 MOSM/KG (ref 275–295)
PLATELET # BLD AUTO: 209 10(3)UL (ref 150–450)
POTASSIUM SERPL-SCNC: 4 MMOL/L (ref 3.5–5.1)
POTASSIUM SERPL-SCNC: 4 MMOL/L (ref 3.5–5.1)
RBC # BLD AUTO: 3.72 X10(6)UL
SODIUM SERPL-SCNC: 142 MMOL/L (ref 136–145)
WBC # BLD AUTO: 6.8 X10(3) UL (ref 4–11)

## 2025-01-05 PROCEDURE — 94640 AIRWAY INHALATION TREATMENT: CPT

## 2025-01-05 PROCEDURE — 85025 COMPLETE CBC W/AUTO DIFF WBC: CPT | Performed by: INTERNAL MEDICINE

## 2025-01-05 PROCEDURE — 83735 ASSAY OF MAGNESIUM: CPT | Performed by: INTERNAL MEDICINE

## 2025-01-05 PROCEDURE — 84132 ASSAY OF SERUM POTASSIUM: CPT | Performed by: INTERNAL MEDICINE

## 2025-01-05 PROCEDURE — 80048 BASIC METABOLIC PNL TOTAL CA: CPT | Performed by: INTERNAL MEDICINE

## 2025-01-05 RX ORDER — MAGNESIUM OXIDE 400 MG/1
400 TABLET ORAL ONCE
Status: COMPLETED | OUTPATIENT
Start: 2025-01-05 | End: 2025-01-05

## 2025-01-05 RX ORDER — IPRATROPIUM BROMIDE AND ALBUTEROL SULFATE 2.5; .5 MG/3ML; MG/3ML
3 SOLUTION RESPIRATORY (INHALATION) EVERY 6 HOURS PRN
Status: DISCONTINUED | OUTPATIENT
Start: 2025-01-05 | End: 2025-01-10

## 2025-01-05 NOTE — PROGRESS NOTES
[facility] Progress [facility]     CC: Hospital Follow up    PCP: No primary care provider on file.       Assessment/Plan:     Principal Problem:    Community acquired pneumonia of left lower lobe of lung  Active Problems:    Hyponatremia    Leukocytosis    Azotemia    Hyperglycemia    Patient is a 89 year old female with PMH Hypertension, Hyperlipidemia, Pancreatic insufficiency, Myasthenia Gravis who presents with approximately 7 to 9 days of increasing URI symptoms, chills and fatigue.  Admitted with PNA and positive for parainfluenza and norovirus.     Community-acquired pneumonia   Parainfluenza   - retrocardiac infiltrate noted on CXR, presumed superimposed bacterial  - Augmentin   - Azithro for 3 doses   - Repeat CXR with worsening edema in right side, IV lasix x 1   - Legionella negative  - mycoplasma pending   - Started dextromethorphan guaifenesin scheduled given increased sputum viscosity  - Trend wbc and fevers   - Wean O2 in 3L, IS, Up to chair     Nausea   Abdominal distention   - stop imodium   - Bms have returned   - KUB and CT a/p reviewed with no ileus or SBO seen   - ambulate  - CLD, reglan scheduled      Myasthenia gravis without acute flare  - Continue home mycophenolate     Mild hyponatremia - resolved   Hypokalemia likely from emesis and poor PO intake - trend and replace as needed  - PO intake is still low with low glucose   - maintenance fluids started     Hyperlipidemia  - Continue home statin     Essential hypertension  - Continue home metoprolol     Chronic weakness PT/OT eval     Chronic diarrhea   - resume pancrelipase  - Worsened inpatient   - GI panel + for norovirus  - stop imodium      FN:  - IVF: None  - Diet: General diet      DVT Prophy: Heparin TID   Lines:PIV    Questions/concerns were discussed with patient and/or family by bedside.    Thank You,  Angel Abernathy MD     Hospitalist with [facility]     Subjective:     Feels breathing is about the  same  Bms have resumed   feels tired but less coughing, no chest pains  Has not ambulated today     OBJECTIVE:    Blood pressure 136/69, pulse 77, temperature 98.1 °F (36.7 °C), temperature source Temporal, resp. rate 16, height 5' 1\" (1.549 m), weight 137 lb (62.1 kg), SpO2 99%.    Temp:  [97.8 °F (36.6 °C)-98.5 °F (36.9 °C)] 98.1 °F (36.7 °C)  Pulse:  [77-90] 77  Resp:  [16-18] 16  BP: (130-138)/(59-69) 136/69  SpO2:  [94 %-99 %] 99 %      Intake/Output:    Intake/Output Summary (Last 24 hours) at 1/5/2025 1200  Last data filed at 1/5/2025 0359  Gross per 24 hour   Intake --   Output 350 ml   Net -350 ml       Last 3 Weights   01/03/25 2359 137 lb (62.1 kg)   01/01/25 1016 137 lb (62.1 kg)   11/01/24 0913 142 lb 6.4 oz (64.6 kg)   10/18/24 2156 140 lb 9.6 oz (63.8 kg)   10/18/24 1840 140 lb (63.5 kg)       Exam   Gen: No acute distress, alert and oriented x3  Heent: NC AT, neck supple  Pulm: Congestion in both lungs worse on right , no wheezes   CV: Heart with regular rate and rhythm, no peripheral edema  Abd: Abdomen soft, distended  Ext: b/l mild edema     Data Review:       Labs:     Recent Labs   Lab 01/01/25  1023 01/02/25  0422 01/03/25  0427 01/04/25  0450 01/05/25  0524   RBC 4.59   < > 3.66* 3.75* 3.72*   HGB 13.6   < > 11.3* 11.8* 11.7*   HCT 41.9   < > 33.4* 34.2* 34.1*   MCV 91.3   < > 91.3 91.2 91.7   MCH 29.6   < > 30.9 31.5 31.5   MCHC 32.5   < > 33.8 34.5 34.3   RDW 13.8   < > 13.7 13.6 13.5   NEPRELIM 13.79*  --   --   --  4.81   WBC 14.7*   < > 8.6 8.7 6.8   .0   < > 166.0 185.0 209.0    < > = values in this interval not displayed.         Recent Labs   Lab 01/03/25  0427 01/04/25  0450 01/05/25  0525   GLU 94 68* 117*   BUN 12 14 11   CREATSERUM 0.61 0.62 0.56   EGFRCR 84 83 86   CA 8.5* 8.8 8.8    139 142   K 3.4* 3.4*  3.4* 4.0  4.0    103 104   CO2 30.0 29.0 32.0       No results for input(s): \"ALT\", \"AST\", \"ALB\", \"AMYLASE\", \"LIPASE\", \"LDH\" in the last 168  hours.    Invalid input(s): \"ALPHOS\", \"TBIL\", \"DBIL\", \"TPROT\"      Imaging:  CT ABDOMEN+PELVIS(CPT=74176)    Result Date: 1/4/2025  CONCLUSION:  1. Gastric distension is similar to the prior radiographs without evidence of bowel obstruction.  This finding is nonspecific but could relate to gastroparesis. 2. Multi-vessel coronary atherosclerosis and small bilateral pleural effusions, possibly related to mild CHF or fluid overload.  Multifocal consolidation at the lung bases may relate to passive atelectasis and/or pneumonia including aspiration pneumonia. 3. Distended urinary bladder, possibly relating to urinary retention.  No hydronephrosis. 4. Pancreas is diffusely atrophic with mild ductal dilatation.  The 2.5 cm complex cyst noted in the pancreatic head on the previous ultrasound is difficult to evaluate without IV contrast but may still be present.  It is uncertain if these findings relate to chronic pancreatitis with a complex pseudocyst or a cystic neoplasm such as an IPMN. 5. Uncomplicated pancolonic and duodenal diverticulosis. 6. Lesser incidental findings as above.    Dictated by (CST): Clarence Asencio MD on 1/04/2025 at 5:05 PM     Finalized by (CST): Clarence Asencio MD on 1/04/2025 at 5:13 PM          XR ABDOMEN (1 VIEW) (CPT=74018)    Result Date: 1/4/2025  CONCLUSION:   Multiple distended loops of small bowel measuring up to 3 cm within the right upper and lower quadrants.  Severely distended rounded loop of bowel within the mid abdomen suggestive of stomach.  CT of the abdomen and pelvis suggested for further characterization.  Gas seen within a distended loop of rectum.     Dictated by (CST): Fortino Preston MD on 1/04/2025 at 1:28 PM     Finalized by (CST): Fortino Preston MD on 1/04/2025 at 1:29 PM             Meds:      metoclopramide  5 mg Intravenous TID    amoxicillin potassium and clavulanate  875 mg Oral Q12H    dextromethorphan-guaifenesin ER  1 tablet Oral BID    azithromycin  500 mg Oral  Daily    atorvastatin  40 mg Oral Nightly    aspirin  81 mg Oral Daily    gabapentin  300 mg Oral Nightly    metoprolol succinate ER  50 mg Oral QAM    mycophenolate mofetil  500 mg Oral BID    pantoprazole  40 mg Oral QAM    lipase-protease-amylase (Lip-Prot-Amyl)  40,000 Units Oral TID CC    heparin  5,000 Units Subcutaneous Q8H ANGELA      potassium chloride in dextrose 5%-sodium chloride 0.9% 50 mL/hr at 01/05/25 0300         ipratropium-albuterol    acetaminophen    polyethylene glycol (PEG 3350)    sennosides    bisacodyl    fleet enema    ondansetron

## 2025-01-05 NOTE — PLAN OF CARE
Monitoring vital signs, stable at this time. Pain medication provided as needed. Encouraging ambulation/ participation for PT/ OT. No acute changes at this moment. Safety and fall precautions in place, bed locked and in lowest position, call light in reach and non skid socks in place. Frequent rounding by nursing staff.      Problem: Patient Centered Care  Goal: Patient preferences are identified and integrated in the patient's plan of care  Description: Interventions:  - What would you like us to know as we care for you?   - Provide timely, complete, and accurate information to patient/family  - Incorporate patient and family knowledge, values, beliefs, and cultural backgrounds into the planning and delivery of care  - Encourage patient/family to participate in care and decision-making at the level they choose  - Honor patient and family perspectives and choices  Outcome: Progressing     Problem: Patient/Family Goals  Goal: Patient/Family Long Term Goal  Description: Patient's Long Term Goal: to be free of infection.    Interventions:  - iv and oral antibiotics given.  - See additional Care Plan goals for specific interventions  Outcome: Progressing  Goal: Patient/Family Short Term Goal  Description: Patient's Short Term Goal: to have a clear plan for discharge.    Interventions:   - case management initiated.  - See additional Care Plan goals for specific interventions  Outcome: Progressing     Problem: PAIN - ADULT  Goal: Verbalizes/displays adequate comfort level or patient's stated pain goal  Description: INTERVENTIONS:  - Encourage pt to monitor pain and request assistance  - Assess pain using appropriate pain scale  - Administer analgesics based on type and severity of pain and evaluate response  - Implement non-pharmacological measures as appropriate and evaluate response  - Consider cultural and social influences on pain and pain management  - Manage/alleviate anxiety  - Utilize distraction and/or  relaxation techniques  - Monitor for opioid side effects  - Notify MD/LIP if interventions unsuccessful or patient reports new pain  - Anticipate increased pain with activity and pre-medicate as appropriate  Outcome: Progressing     Problem: RISK FOR INFECTION - ADULT  Goal: Absence of fever/infection during anticipated neutropenic period  Description: INTERVENTIONS  - Monitor WBC  - Administer growth factors as ordered  - Implement neutropenic guidelines  Outcome: Progressing     Problem: SAFETY ADULT - FALL  Goal: Free from fall injury  Description: INTERVENTIONS:  - Assess pt frequently for physical needs  - Identify cognitive and physical deficits and behaviors that affect risk of falls.  - [place] fall precautions as indicated by assessment.  - Educate pt/family on patient safety including physical limitations  - Instruct pt to call for assistance with activity based on assessment  - Modify environment to reduce risk of injury  - Provide assistive devices as appropriate  - Consider OT/PT consult to assist with strengthening/mobility  - Encourage toileting schedule  Outcome: Progressing     Problem: DISCHARGE PLANNING  Goal: Discharge to home or other facility with appropriate resources  Description: INTERVENTIONS:  - Identify barriers to discharge w/pt and caregiver  - Include patient/family/discharge partner in discharge planning  - Arrange for needed discharge resources and transportation as appropriate  - Identify discharge learning needs (meds, wound care, etc)  - Arrange for interpreters to assist at discharge as needed  - Consider post-discharge preferences of patient/family/discharge partner  - Complete POLST form as appropriate  - Assess patient's ability to be responsible for managing their own health  - Refer to Case Management Department for coordinating discharge planning if the patient needs post-hospital services based on physician/LIP order or complex needs related to functional status,  cognitive ability or social support system  Outcome: Progressing

## 2025-01-05 NOTE — PLAN OF CARE
Pt is A&O x4. Breathing on 3L O2 via NC. Refused Heparin subcutaneous and SCDs. Voiding via Purewick. NPO. Continuous IV fluid infusing. Call light within reach. Safety precaution in place.    Problem: Patient Centered Care  Goal: Patient preferences are identified and integrated in the patient's plan of care  Description: Interventions:  - What would you like us to know as we care for you?   - Provide timely, complete, and accurate information to patient/family  - Incorporate patient and family knowledge, values, beliefs, and cultural backgrounds into the planning and delivery of care  - Encourage patient/family to participate in care and decision-making at the level they choose  - Honor patient and family perspectives and choices  Outcome: Progressing     Problem: Patient/Family Goals  Goal: Patient/Family Long Term Goal  Description: Patient's Long Term Goal: to be free of infection.    Interventions:  - iv and oral antibiotics given.  - See additional Care Plan goals for specific interventions  Outcome: Progressing  Goal: Patient/Family Short Term Goal  Description: Patient's Short Term Goal: to have a clear plan for discharge.    Interventions:   - case management initiated.  - See additional Care Plan goals for specific interventions  Outcome: Progressing     Problem: PAIN - ADULT  Goal: Verbalizes/displays adequate comfort level or patient's stated pain goal  Description: INTERVENTIONS:  - Encourage pt to monitor pain and request assistance  - Assess pain using appropriate pain scale  - Administer analgesics based on type and severity of pain and evaluate response  - Implement non-pharmacological measures as appropriate and evaluate response  - Consider cultural and social influences on pain and pain management  - Manage/alleviate anxiety  - Utilize distraction and/or relaxation techniques  - Monitor for opioid side effects  - Notify MD/LIP if interventions unsuccessful or patient reports new pain  -  Anticipate increased pain with activity and pre-medicate as appropriate  Outcome: Progressing     Problem: RISK FOR INFECTION - ADULT  Goal: Absence of fever/infection during anticipated neutropenic period  Description: INTERVENTIONS  - Monitor WBC  - Administer growth factors as ordered  - Implement neutropenic guidelines  Outcome: Progressing     Problem: SAFETY ADULT - FALL  Goal: Free from fall injury  Description: INTERVENTIONS:  - Assess pt frequently for physical needs  - Identify cognitive and physical deficits and behaviors that affect risk of falls.  - [place] fall precautions as indicated by assessment.  - Educate pt/family on patient safety including physical limitations  - Instruct pt to call for assistance with activity based on assessment  - Modify environment to reduce risk of injury  - Provide assistive devices as appropriate  - Consider OT/PT consult to assist with strengthening/mobility  - Encourage toileting schedule  Outcome: Progressing     Problem: DISCHARGE PLANNING  Goal: Discharge to home or other facility with appropriate resources  Description: INTERVENTIONS:  - Identify barriers to discharge w/pt and caregiver  - Include patient/family/discharge partner in discharge planning  - Arrange for needed discharge resources and transportation as appropriate  - Identify discharge learning needs (meds, wound care, etc)  - Arrange for interpreters to assist at discharge as needed  - Consider post-discharge preferences of patient/family/discharge partner  - Complete POLST form as appropriate  - Assess patient's ability to be responsible for managing their own health  - Refer to Case Management Department for coordinating discharge planning if the patient needs post-hospital services based on physician/LIP order or complex needs related to functional status, cognitive ability or social support system  Outcome: Progressing

## 2025-01-06 LAB
ANION GAP SERPL CALC-SCNC: 6 MMOL/L (ref 0–18)
BASOPHILS # BLD AUTO: 0.05 X10(3) UL (ref 0–0.2)
BASOPHILS NFR BLD AUTO: 0.7 %
BUN BLD-MCNC: 5 MG/DL (ref 9–23)
BUN/CREAT SERPL: 10.9 (ref 10–20)
CALCIUM BLD-MCNC: 8.9 MG/DL (ref 8.7–10.4)
CHLORIDE SERPL-SCNC: 103 MMOL/L (ref 98–112)
CO2 SERPL-SCNC: 31 MMOL/L (ref 21–32)
CREAT BLD-MCNC: 0.46 MG/DL
DEPRECATED RDW RBC AUTO: 46.1 FL (ref 35.1–46.3)
EGFRCR SERPLBLD CKD-EPI 2021: 90 ML/MIN/1.73M2 (ref 60–?)
EOSINOPHIL # BLD AUTO: 0.53 X10(3) UL (ref 0–0.7)
EOSINOPHIL NFR BLD AUTO: 7.7 %
ERYTHROCYTE [DISTWIDTH] IN BLOOD BY AUTOMATED COUNT: 13.5 % (ref 11–15)
GLUCOSE BLD-MCNC: 110 MG/DL (ref 70–99)
HCT VFR BLD AUTO: 36.3 %
HGB BLD-MCNC: 11.5 G/DL
IMM GRANULOCYTES # BLD AUTO: 0.05 X10(3) UL (ref 0–1)
IMM GRANULOCYTES NFR BLD: 0.7 %
LYMPHOCYTES # BLD AUTO: 1.21 X10(3) UL (ref 1–4)
LYMPHOCYTES NFR BLD AUTO: 17.5 %
M PNEUMO PCR: NEGATIVE
M PNEUMO PCR: NEGATIVE
MAGNESIUM SERPL-MCNC: 1.4 MG/DL (ref 1.6–2.6)
MCH RBC QN AUTO: 29.6 PG (ref 26–34)
MCHC RBC AUTO-ENTMCNC: 31.7 G/DL (ref 31–37)
MCV RBC AUTO: 93.3 FL
MONOCYTES # BLD AUTO: 0.62 X10(3) UL (ref 0.1–1)
MONOCYTES NFR BLD AUTO: 9 %
NEUTROPHILS # BLD AUTO: 4.46 X10 (3) UL (ref 1.5–7.7)
NEUTROPHILS # BLD AUTO: 4.46 X10(3) UL (ref 1.5–7.7)
NEUTROPHILS NFR BLD AUTO: 64.4 %
OSMOLALITY SERPL CALC.SUM OF ELEC: 288 MOSM/KG (ref 275–295)
PLATELET # BLD AUTO: 227 10(3)UL (ref 150–450)
POTASSIUM SERPL-SCNC: 3.7 MMOL/L (ref 3.5–5.1)
RBC # BLD AUTO: 3.89 X10(6)UL
SODIUM SERPL-SCNC: 140 MMOL/L (ref 136–145)
WBC # BLD AUTO: 6.9 X10(3) UL (ref 4–11)

## 2025-01-06 PROCEDURE — 80048 BASIC METABOLIC PNL TOTAL CA: CPT | Performed by: INTERNAL MEDICINE

## 2025-01-06 PROCEDURE — 97116 GAIT TRAINING THERAPY: CPT

## 2025-01-06 PROCEDURE — 83735 ASSAY OF MAGNESIUM: CPT | Performed by: INTERNAL MEDICINE

## 2025-01-06 PROCEDURE — 97530 THERAPEUTIC ACTIVITIES: CPT

## 2025-01-06 PROCEDURE — 85025 COMPLETE CBC W/AUTO DIFF WBC: CPT | Performed by: INTERNAL MEDICINE

## 2025-01-06 RX ORDER — METOCLOPRAMIDE HYDROCHLORIDE 5 MG/ML
5 INJECTION INTRAMUSCULAR; INTRAVENOUS 3 TIMES DAILY PRN
Status: DISCONTINUED | OUTPATIENT
Start: 2025-01-06 | End: 2025-01-10

## 2025-01-06 RX ORDER — MAGNESIUM OXIDE 400 MG/1
800 TABLET ORAL ONCE
Status: COMPLETED | OUTPATIENT
Start: 2025-01-06 | End: 2025-01-06

## 2025-01-06 NOTE — PHYSICAL THERAPY NOTE
PHYSICAL THERAPY TREATMENT NOTE - INPATIENT     Room Number: 423/423-A       Presenting Problem: pneumonia  Co-Morbidities : htn,myasthenia gravis    Problem List  Principal Problem:    Community acquired pneumonia of left lower lobe of lung  Active Problems:    Hyponatremia    Leukocytosis    Azotemia    Hyperglycemia      PHYSICAL THERAPY ASSESSMENT   Patient demonstrates fair progress this session, goals  remain in progress.      Patient is requiring minimal assist and moderate assist as a result of the following impairments: decreased functional strength, decreased endurance/aerobic capacity, impaired sitting and standing balance, decreased muscular endurance, medical status, and increased O2 needs from baseline.     Patient continues to function below baseline with bed mobility, transfers, gait, maintaining seated position, standing prolonged periods, and performing household tasks.  Next session anticipate patient to progress bed mobility, transfers, gait, maintaining seated position, and standing prolonged periods.  Physical Therapy will continue to follow patient for duration of hospitalization.    Patient continues to benefit from continued skilled PT services: to promote return to prior level of function and safety with continuous assistance and gradual rehabilitative therapy .    PLAN DURING HOSPITALIZATION  Nursing Mobility Recommendation : 1 Assist     PT Treatment Plan: Endurance;Gait training;Body mechanics;Coordination;Transfer training;Balance training  Frequency (Obs): 3-5x/week     SUBJECTIVE  \"I can't get up by myself\"     OBJECTIVE  Precautions: Bed/chair alarm (enteric/contact + and droplet)      PAIN ASSESSMENT   Ratin  Location:  (low back ache during coughing)       BALANCE  Static Sitting: Fair -  Dynamic Sitting: Poor +  Static Standing: Poor +  Dynamic Standing: Poor    ACTIVITY TOLERANCE  Pulse: 86  Heart Rate Source: Monitor         O2 WALK  Oxygen Therapy  SPO2% on Oxygen at Rest:  94  At rest oxygen flow (liters per minute): 3  SPO2% Ambulation on Oxygen: 95  Ambulation oxygen flow (liters per minute): 3    AM-PAC '6-Clicks' INPATIENT SHORT FORM - BASIC MOBILITY  How much difficulty does the patient currently have...  Patient Difficulty: Turning over in bed (including adjusting bedclothes, sheets and blankets)?: A Little   Patient Difficulty: Sitting down on and standing up from a chair with arms (e.g., wheelchair, bedside commode, etc.): A Lot   Patient Difficulty: Moving from lying on back to sitting on the side of the bed?: A Lot   How much help from another person does the patient currently need...   Help from Another: Moving to and from a bed to a chair (including a wheelchair)?: A Little   Help from Another: Need to walk in hospital room?: A Lot   Help from Another: Climbing 3-5 steps with a railing?: A Lot     AM-PAC Score:  Raw Score: 14   Approx Degree of Impairment: 61.29%   Standardized Score (AM-PAC Scale): 38.1   CMS Modifier (G-Code): CL    FUNCTIONAL ABILITY STATUS  Functional Mobility/Gait Assessment  Gait Assistance: Minimum assistance;Moderate assistance  Distance (ft): 20ft with chair follow  Assistive Device: Rolling walker  Pattern: Shuffle (flexed posture, decreased juan speed, decreased step length. Min A initially progressing to Mod A as pt fatigued.)  Rolling: minimal assist  Supine to Sit: moderate assist. Task was labored requiring increased time to complete.   Sit to Stand: minimal assist from EOB. Mod A sit<>stand from chair. Cues for appropriate UE positioning with transitional movements. Instruction on upright posture in standing. CGA to maintain static standing balance with bilateral UE support on RW.     Skilled Therapy Provided: Patient received supine in bed. RN approved activity. Therapist educated pt on POC and physiological benefits of mobilization. Patient agreeable to participate. Primary complaint is weakness. Denies pain. Fatigued quickly with  activity with c/o shortness of breath with exertion. *SpO2 on 3L/min supplemental O2 at rest: 94% with activity: 95%.     The patient's Approx Degree of Impairment: 61.29% has been calculated based on documentation in the [facility] '6 clicks' Inpatient Daily Activity Short Form.  Research supports that patients with this level of impairment may benefit from rehab.  Final disposition will be made by interdisciplinary medical team.    THERAPEUTIC EXERCISES  Lower Extremity Ankle pumps     Position Supine       Patient End of Session: Up in chair;Needs met;Call light within reach;RN aware of session/findings;Alarm set;With  staff    CURRENT GOALS   Goals to be met by: 1/16/25  Patient Goal Patient's self-stated goal is: to go home   Goal #1 Patient is able to demonstrate supine - sit EOB @ level: independent     Goal #1   Current Status [place]/ModA   Goal #2 Patient is able to demonstrate transfers Sit to/from Stand at assistance level: supervision with none     Goal #2  Current Status Min A/Mod A with RW   Goal #3 Patient is able to ambulate 50 feet with assist device: walker - rolling at assistance level: supervision   Goal #3   Current Status Min/Mod A with RW 20ft with chair follow   Goal #4 Patient to demonstrate independence with home activity/exercise instructions provided to patient in preparation for discharge.   Goal #4   Current Status instructed     Gait Training: 10 minutes  Therapeutic Activity: 15 minutes

## 2025-01-06 NOTE — PLAN OF CARE
IV fluids continued. Diet advance to full liquids. On room air. Plan to discharge to [facility] when cleared.    Problem: Patient Centered Care  Goal: Patient preferences are identified and integrated in the patient's plan of care  Description: Interventions:  - What would you like us to know as we care for you?   - Provide timely, complete, and accurate information to patient/family  - Incorporate patient and family knowledge, values, beliefs, and cultural backgrounds into the planning and delivery of care  - Encourage patient/family to participate in care and decision-making at the level they choose  - Honor patient and family perspectives and choices  Outcome: Progressing     Problem: Patient/Family Goals  Goal: Patient/Family Long Term Goal  Description: Patient's Long Term Goal: to be free of infection.    Interventions:  - iv and oral antibiotics given.  - See additional Care Plan goals for specific interventions  Outcome: Progressing  Goal: Patient/Family Short Term Goal  Description: Patient's Short Term Goal: to have a clear plan for discharge.    Interventions:   - case management initiated.  - See additional Care Plan goals for specific interventions  Outcome: Progressing     Problem: PAIN - ADULT  Goal: Verbalizes/displays adequate comfort level or patient's stated pain goal  Description: INTERVENTIONS:  - Encourage pt to monitor pain and request assistance  - Assess pain using appropriate pain scale  - Administer analgesics based on type and severity of pain and evaluate response  - Implement non-pharmacological measures as appropriate and evaluate response  - Consider cultural and social influences on pain and pain management  - Manage/alleviate anxiety  - Utilize distraction and/or relaxation techniques  - Monitor for opioid side effects  - Notify MD/LIP if interventions unsuccessful or patient reports new pain  - Anticipate increased pain with activity and pre-medicate as appropriate  Outcome: Progressing      Problem: RISK FOR INFECTION - ADULT  Goal: Absence of fever/infection during anticipated neutropenic period  Description: INTERVENTIONS  - Monitor WBC  - Administer growth factors as ordered  - Implement neutropenic guidelines  Outcome: Progressing     Problem: SAFETY ADULT - FALL  Goal: Free from fall injury  Description: INTERVENTIONS:  - Assess pt frequently for physical needs  - Identify cognitive and physical deficits and behaviors that affect risk of falls.  - [place] fall precautions as indicated by assessment.  - Educate pt/family on patient safety including physical limitations  - Instruct pt to call for assistance with activity based on assessment  - Modify environment to reduce risk of injury  - Provide assistive devices as appropriate  - Consider OT/PT consult to assist with strengthening/mobility  - Encourage toileting schedule  Outcome: Progressing     Problem: DISCHARGE PLANNING  Goal: Discharge to home or other facility with appropriate resources  Description: INTERVENTIONS:  - Identify barriers to discharge w/pt and caregiver  - Include patient/family/discharge partner in discharge planning  - Arrange for needed discharge resources and transportation as appropriate  - Identify discharge learning needs (meds, wound care, etc)  - Arrange for interpreters to assist at discharge as needed  - Consider post-discharge preferences of patient/family/discharge partner  - Complete POLST form as appropriate  - Assess patient's ability to be responsible for managing their own health  - Refer to Case Management Department for coordinating discharge planning if the patient needs post-hospital services based on physician/LIP order or complex needs related to functional status, cognitive ability or social support system  Outcome: Progressing

## 2025-01-06 NOTE — PROGRESS NOTES
[facility] Hospitalist Progress Note     CC: Hospital Follow up    PCP: No primary care provider on file.       Assessment/Plan:     Principal Problem:    Community acquired pneumonia of left lower lobe of lung  Active Problems:    Hyponatremia    Leukocytosis    Azotemia    Hyperglycemia    Patient is a 89 year old female with PMH Hypertension, Hyperlipidemia, Pancreatic insufficiency, Myasthenia Gravis who presents with approximately 7 to 9 days of increasing URI symptoms, chills and fatigue.  Admitted with PNA and positive for parainfluenza and norovirus.     Community-acquired pneumonia   Parainfluenza   - retrocardiac infiltrate noted on CXR, presumed superimposed bacterial  - Augmentin day 4  - Azithro 5 days complete   - Repeat CXR with worsening edema in right side, IV lasix x 1   - Legionella negative  - mycoplasma pending   - Started dextromethorphan guaifenesin scheduled given increased sputum viscosity  - Trend wbc and fevers   - Wean O2 in 3L, IS, Up to chair     Nausea   Abdominal distention   - stop imodium   - Bms have returned   - KUB and CT a/p reviewed with no ileus or SBO seen   - ambulate  - CLD, reglan scheduled, full liquids today      Myasthenia gravis without acute flare  - Continue home mycophenolate     Mild hyponatremia - resolved   Hypokalemia likely from emesis and poor PO intake - trend and replace as needed  - PO intake is still low with low glucose   - maintenance fluids started     Hyperlipidemia  - Continue home statin     Essential hypertension  - Continue home metoprolol     Chronic weakness PT/OT eval     Chronic diarrhea   - resume pancrelipase  - Worsened inpatient   - GI panel + for norovirus  - stop imodium      FN:  - IVF: None  - Diet: General diet   - PT/OT     DVT Prophy: Heparin TID   Lines:PIV    Questions/concerns were discussed with patient and/or family by bedside.    Thank You,  Angel Abernathy MD     Hospitalist with [facility]     Subjective:      Feels breathing is about the same  Bms have resumed   feels tired but less coughing, no chest pains  Has not ambulated in 2 days.     OBJECTIVE:    Blood pressure 152/72, pulse 86, temperature 98 °F (36.7 °C), temperature source Oral, resp. rate 16, height 5' 1\" (1.549 m), weight 137 lb (62.1 kg), SpO2 93%.    Temp:  [97.8 °F (36.6 °C)-98 °F (36.7 °C)] 98 °F (36.7 °C)  Pulse:  [81-88] 86  Resp:  [16] 16  BP: (132-152)/(63-80) 152/72  SpO2:  [92 %-97 %] 93 %      Intake/Output:    Intake/Output Summary (Last 24 hours) at 1/6/2025 1136  Last data filed at 1/6/2025 1059  Gross per 24 hour   Intake 490 ml   Output 300 ml   Net 190 ml       Last 3 Weights   01/03/25 2359 137 lb (62.1 kg)   01/01/25 1016 137 lb (62.1 kg)   11/01/24 0913 142 lb 6.4 oz (64.6 kg)   10/18/24 2156 140 lb 9.6 oz (63.8 kg)   10/18/24 1840 140 lb (63.5 kg)       Exam   Gen: No acute distress, alert and oriented x3  Heent: NC AT, neck supple  Pulm: Congestion in both lungs worse on right , no wheezes   CV: Heart with regular rate and rhythm, no peripheral edema  Abd: Abdomen soft, distended  Ext: b/l mild edema     Data Review:       Labs:     Recent Labs   Lab 01/01/25  1023 01/02/25  0422 01/04/25  0450 01/05/25  0524 01/06/25  0514   RBC 4.59   < > 3.75* 3.72* 3.89   HGB 13.6   < > 11.8* 11.7* 11.5*   HCT 41.9   < > 34.2* 34.1* 36.3   MCV 91.3   < > 91.2 91.7 93.3   MCH 29.6   < > 31.5 31.5 29.6   MCHC 32.5   < > 34.5 34.3 31.7   RDW 13.8   < > 13.6 13.5 13.5   NEPRELIM 13.79*  --   --  4.81 4.46   WBC 14.7*   < > 8.7 6.8 6.9   .0   < > 185.0 209.0 227.0    < > = values in this interval not displayed.         Recent Labs   Lab 01/04/25  0450 01/05/25  0525 01/06/25  0514   GLU 68* 117* 110*   BUN 14 11 5*   CREATSERUM 0.62 0.56 0.46*   EGFRCR 83 86 90   CA 8.8 8.8 8.9    142 140   K 3.4*  3.4* 4.0  4.0 3.7    104 103   CO2 29.0 32.0 31.0       No results for input(s): \"ALT\", \"AST\", \"ALB\", \"AMYLASE\", \"LIPASE\", \"LDH\" in  the last 168 hours.    Invalid input(s): \"ALPHOS\", \"TBIL\", \"DBIL\", \"TPROT\"      Imaging:  CT ABDOMEN+PELVIS(CPT=74176)    Result Date: 1/4/2025  CONCLUSION:  1. Gastric distension is similar to the prior radiographs without evidence of bowel obstruction.  This finding is nonspecific but could relate to gastroparesis. 2. Multi-vessel coronary atherosclerosis and small bilateral pleural effusions, possibly related to mild CHF or fluid overload.  Multifocal consolidation at the lung bases may relate to passive atelectasis and/or pneumonia including aspiration pneumonia. 3. Distended urinary bladder, possibly relating to urinary retention.  No hydronephrosis. 4. Pancreas is diffusely atrophic with mild ductal dilatation.  The 2.5 cm complex cyst noted in the pancreatic head on the previous ultrasound is difficult to evaluate without IV contrast but may still be present.  It is uncertain if these findings relate to chronic pancreatitis with a complex pseudocyst or a cystic neoplasm such as an IPMN. 5. Uncomplicated pancolonic and duodenal diverticulosis. 6. Lesser incidental findings as above.    Dictated by (CST): Clarence Asencio MD on 1/04/2025 at 5:05 PM     Finalized by (CST): Clarence Asencio MD on 1/04/2025 at 5:13 PM          XR ABDOMEN (1 VIEW) (CPT=74018)    Result Date: 1/4/2025  CONCLUSION:   Multiple distended loops of small bowel measuring up to 3 cm within the right upper and lower quadrants.  Severely distended rounded loop of bowel within the mid abdomen suggestive of stomach.  CT of the abdomen and pelvis suggested for further characterization.  Gas seen within a distended loop of rectum.     Dictated by (CST): Fortino Preston MD on 1/04/2025 at 1:28 PM     Finalized by (CST): Fortino Preston MD on 1/04/2025 at 1:29 PM             Meds:      metoclopramide  5 mg Intravenous TID    amoxicillin potassium and clavulanate  875 mg Oral Q12H    dextromethorphan-guaifenesin ER  1 tablet Oral BID    azithromycin   500 mg Oral Daily    atorvastatin  40 mg Oral Nightly    aspirin  81 mg Oral Daily    gabapentin  300 mg Oral Nightly    metoprolol succinate ER  50 mg Oral QAM    mycophenolate mofetil  500 mg Oral BID    pantoprazole  40 mg Oral QAM    lipase-protease-amylase (Lip-Prot-Amyl)  40,000 Units Oral TID CC    heparin  5,000 Units Subcutaneous Q8H ANGELA      potassium chloride in dextrose 5%-sodium chloride 0.9% 50 mL/hr at 01/05/25 8699         ipratropium-albuterol    acetaminophen    polyethylene glycol (PEG 3350)    sennosides    bisacodyl    fleet enema    ondansetron

## 2025-01-07 LAB
ANION GAP SERPL CALC-SCNC: 6 MMOL/L (ref 0–18)
BASOPHILS # BLD AUTO: 0.04 X10(3) UL (ref 0–0.2)
BASOPHILS NFR BLD AUTO: 0.5 %
BUN BLD-MCNC: 6 MG/DL (ref 9–23)
BUN/CREAT SERPL: 12 (ref 10–20)
CALCIUM BLD-MCNC: 9.2 MG/DL (ref 8.7–10.4)
CHLORIDE SERPL-SCNC: 100 MMOL/L (ref 98–112)
CO2 SERPL-SCNC: 31 MMOL/L (ref 21–32)
CREAT BLD-MCNC: 0.5 MG/DL
DEPRECATED RDW RBC AUTO: 43.7 FL (ref 35.1–46.3)
EGFRCR SERPLBLD CKD-EPI 2021: 88 ML/MIN/1.73M2 (ref 60–?)
EOSINOPHIL # BLD AUTO: 0.31 X10(3) UL (ref 0–0.7)
EOSINOPHIL NFR BLD AUTO: 4.3 %
ERYTHROCYTE [DISTWIDTH] IN BLOOD BY AUTOMATED COUNT: 13.2 % (ref 11–15)
GLUCOSE BLD-MCNC: 123 MG/DL (ref 70–99)
HCT VFR BLD AUTO: 36.8 %
HGB BLD-MCNC: 12.3 G/DL
IMM GRANULOCYTES # BLD AUTO: 0.05 X10(3) UL (ref 0–1)
IMM GRANULOCYTES NFR BLD: 0.7 %
LYMPHOCYTES # BLD AUTO: 1.4 X10(3) UL (ref 1–4)
LYMPHOCYTES NFR BLD AUTO: 19.2 %
MAGNESIUM SERPL-MCNC: 1.4 MG/DL (ref 1.6–2.6)
MCH RBC QN AUTO: 29.8 PG (ref 26–34)
MCHC RBC AUTO-ENTMCNC: 33.4 G/DL (ref 31–37)
MCV RBC AUTO: 89.1 FL
MONOCYTES # BLD AUTO: 0.56 X10(3) UL (ref 0.1–1)
MONOCYTES NFR BLD AUTO: 7.7 %
NEUTROPHILS # BLD AUTO: 4.93 X10 (3) UL (ref 1.5–7.7)
NEUTROPHILS # BLD AUTO: 4.93 X10(3) UL (ref 1.5–7.7)
NEUTROPHILS NFR BLD AUTO: 67.6 %
OSMOLALITY SERPL CALC.SUM OF ELEC: 283 MOSM/KG (ref 275–295)
PLATELET # BLD AUTO: 238 10(3)UL (ref 150–450)
POTASSIUM SERPL-SCNC: 3.7 MMOL/L (ref 3.5–5.1)
RBC # BLD AUTO: 4.13 X10(6)UL
SODIUM SERPL-SCNC: 137 MMOL/L (ref 136–145)
WBC # BLD AUTO: 7.3 X10(3) UL (ref 4–11)

## 2025-01-07 PROCEDURE — 80048 BASIC METABOLIC PNL TOTAL CA: CPT | Performed by: INTERNAL MEDICINE

## 2025-01-07 PROCEDURE — 83735 ASSAY OF MAGNESIUM: CPT | Performed by: INTERNAL MEDICINE

## 2025-01-07 PROCEDURE — 97116 GAIT TRAINING THERAPY: CPT

## 2025-01-07 PROCEDURE — 85025 COMPLETE CBC W/AUTO DIFF WBC: CPT | Performed by: INTERNAL MEDICINE

## 2025-01-07 PROCEDURE — 97530 THERAPEUTIC ACTIVITIES: CPT

## 2025-01-07 RX ORDER — MAGNESIUM OXIDE 400 MG/1
800 TABLET ORAL ONCE
Status: COMPLETED | OUTPATIENT
Start: 2025-01-07 | End: 2025-01-07

## 2025-01-07 RX ORDER — CALCIUM CARBONATE 500 MG/1
500 TABLET, CHEWABLE ORAL 3 TIMES DAILY
Status: DISCONTINUED | OUTPATIENT
Start: 2025-01-07 | End: 2025-01-10

## 2025-01-07 NOTE — PHYSICAL THERAPY NOTE
PHYSICAL THERAPY TREATMENT NOTE - INPATIENT     Room Number: 423/423-A       Presenting Problem: pneumonia  Co-Morbidities : htn,myasthenia gravis    Problem List  Principal Problem:    Community acquired pneumonia of left lower lobe of lung  Active Problems:    Hyponatremia    Leukocytosis    Azotemia    Hyperglycemia      PHYSICAL THERAPY ASSESSMENT   Patient demonstrates good  progress this session, goals  remain in progress.      Patient is requiring minimal assist as a result of the following impairments: decreased functional strength, decreased endurance/aerobic capacity, decreased muscular endurance, and medical status.     Patient continues to function below baseline with transfers, gait, and standing prolonged periods.  Next session anticipate patient to progress transfers, gait, and standing prolonged periods.  Physical Therapy will continue to follow patient for duration of hospitalization.    Patient continues to benefit from continued skilled PT services: to promote return to prior level of function and safety with continuous assistance and gradual rehabilitative therapy .    PLAN DURING HOSPITALIZATION  Nursing Mobility Recommendation : 1 Assist     PT Treatment Plan: Endurance;Coordination;Body mechanics;Balance training;Transfer training;Gait training;Family education  Frequency (Obs): 3-5x/week     SUBJECTIVE  I need help with clean me up.    OBJECTIVE  Precautions: Bed/chair alarm    WEIGHT BEARING RESTRICTION       PAIN ASSESSMENT   Ratin  Location:  (low back ache during coughing)       BALANCE  Static Sitting: Good  Dynamic Sitting: Fair +  Static Standing: Fair -  Dynamic Standing: Poor +    ACTIVITY TOLERANCE  Pulse: 97  Heart Rate Source: Monitor  Resp: 18  BP: 150/76  BP Location: Right arm  BP Method: Automatic  Patient Position: Semi-Roldan     O2 WALK  Oxygen Therapy  SPO2% on Room Air at Rest: 96    AM-PAC '6-Clicks' INPATIENT SHORT FORM - BASIC MOBILITY  How much difficulty does the  patient currently have...  Patient Difficulty: Turning over in bed (including adjusting bedclothes, sheets and blankets)?: A Little   Patient Difficulty: Sitting down on and standing up from a chair with arms (e.g., wheelchair, bedside commode, etc.): A Little   Patient Difficulty: Moving from lying on back to sitting on the side of the bed?: A Little   How much help from another person does the patient currently need...   Help from Another: Moving to and from a bed to a chair (including a wheelchair)?: A Little   Help from Another: Need to walk in hospital room?: A Little   Help from Another: Climbing 3-5 steps with a railing?: A Lot     AM-PAC Score:  Raw Score: 17   Approx Degree of Impairment: 50.57%   Standardized Score (AM-PAC Scale): 42.13   CMS Modifier (G-Code): CK    FUNCTIONAL ABILITY STATUS  Functional Mobility/Gait Assessment  Gait Assistance: Minimum assistance  Distance (ft): 25  Assistive Device: Rolling walker  Pattern: Shuffle  Rolling:  NT  Supine to Sit:  NT  Sit to Supine: moderate assist  Sit to Stand: minimal assist    Skilled Therapy Provided: RN approved session. Chart reviewed. patient in chair with loose stool on her legs, gown and diapers. RN arrived and patient was cleaned, changing gowns, socks and diapers. Patient is incontinent of stool and urine. She stood ~ 2 min with RW with Min A. Patient amb in her room with RW with Min A ~ 30 ft with slow and shuffling steps.  She wants to back to bed after 4 hrs sitting in chair.     The patient's Approx Degree of Impairment: 50.57% has been calculated based on documentation in the [facility] '6 clicks' Inpatient Daily Activity Short Form.  Research supports that patients with this level of impairment may benefit from GR.  Final disposition will be made by interdisciplinary medical team.    THERAPEUTIC EXERCISES  Lower Extremity Ankle pumps  Heel raises  Quad sets     Position Supine       Patient End of Session: In bed;Restraints;Call light within  reach;Needs met;All patient questions and concerns addressed    CURRENT GOALS   Goals to be met by: 25  Patient Goal Patient's self-stated goal is: to go home   Goal #1 Patient is able to demonstrate supine - sit EOB @ level: independent     Goal #1   Current Status Min A    Goal #2 Patient is able to demonstrate transfers Sit to/from Stand at assistance level: supervision with none     Goal #2  Current Status Min A/Mod A with RW   Goal #3 Patient is able to ambulate 50 feet with assist device: walker - rolling at assistance level: supervision   Goal #3   Current Status Min/Mod A with RW 25ft    Goal #4 Patient to demonstrate independence with home activity/exercise instructions provided to patient in preparation for discharge.   Goal #4   Current Status instructed     Gait Trainin minutes  Therapeutic Activity: 20 minutes  Neuromuscular Re-education:  minutes  Therapeutic Exercise: 7 minutes  Canalith Repositioning:  minutes  Manual Therapy:  minutes  Can add/delete any of these

## 2025-01-07 NOTE — PROGRESS NOTES
[facility] Hospitalist Progress Note     CC: Hospital Follow up    PCP: No primary care provider on file.       Assessment/Plan:     Principal Problem:    Community acquired pneumonia of left lower lobe of lung  Active Problems:    Hyponatremia    Leukocytosis    Azotemia    Hyperglycemia    Patient is a 89 year old female with PMH Hypertension, Hyperlipidemia, Pancreatic insufficiency, Myasthenia Gravis who presents with approximately 7 to 9 days of increasing URI symptoms, chills and fatigue.  Admitted with PNA and positive for parainfluenza and norovirus.  Course complicated with nausea and abdominal distention.     Community-acquired pneumonia   Parainfluenza   - retrocardiac infiltrate noted on CXR, presumed superimposed bacterial  - Repeat CXR with worsening edema in right side, IV lasix x 1   - Legionella negative  - mycoplasma negative   - Started dextromethorphan guaifenesin scheduled given increased sputum viscosity  - Trend wbc and fevers   - Augmentin 5 days complete   - Azithro 5 days complete   - off O2 now on RA  - IS, Up to chair     Nausea   Abdominal distention   - stop imodium   - Bms have returned   - KUB and CT a/p reviewed with no ileus or SBO seen   - possible gastroparesis  - scheduled reglan complete    - ambulate  - advancing diet   - IVF complete       Acute on Chronic diarrhea   Pancreatic insuffiencey   - likely exacerbated with GI panel + for norovirus  - stop imodium 2/2 abd distention   - resume pancrelipase    Myasthenia gravis without acute flare  - Continue home mycophenolate     Mild hyponatremia - resolved   Hypokalemia likely from emesis and poor PO intake - trend and replace as needed  - PO intake is still low with low glucose   - maintenance fluids complete     Hyperlipidemia  - Continue home statin     Essential hypertension  - Continue home metoprolol     Chronic weakness PT/OT eval  - NEGRITA on dc      FN:  - IVF: None  - Diet: General diet   - PT/OT     DVT Prophy:  Heparin TID   Lines:PIV    Questions/concerns were discussed with patient and/or family by bedside.    Thank You,  Angel Abernathy MD     Hospitalist with [facility]     Subjective:     Feels breathing is better   Bms have resumed   feels tired but less coughing, no chest pains  Wants to sit up as she is now getting heartburn     OBJECTIVE:    Blood pressure 150/76, pulse 97, temperature 97.9 °F (36.6 °C), temperature source Temporal, resp. rate 18, height 5' 1\" (1.549 m), weight 137 lb (62.1 kg), SpO2 90%.    Temp:  [96.8 °F (36 °C)-98.4 °F (36.9 °C)] 97.9 °F (36.6 °C)  Pulse:  [86-97] 97  Resp:  [17-18] 18  BP: (142-161)/(58-80) 150/76  SpO2:  [90 %-97 %] 90 %      Intake/Output:    Intake/Output Summary (Last 24 hours) at 1/7/2025 0958  Last data filed at 1/7/2025 0403  Gross per 24 hour   Intake 480 ml   Output 825 ml   Net -345 ml       Last 3 Weights   01/03/25 2359 137 lb (62.1 kg)   01/01/25 1016 137 lb (62.1 kg)   11/01/24 0913 142 lb 6.4 oz (64.6 kg)   10/18/24 2156 140 lb 9.6 oz (63.8 kg)   10/18/24 1840 140 lb (63.5 kg)       Exam   Gen: No acute distress, alert and oriented x3  Heent: NC AT, neck supple  Pulm: Congestion in both lungs worse on right , no wheezes   CV: Heart with regular rate and rhythm, no peripheral edema  Abd: Abdomen soft, distention improved, + BS   Ext: b/l mild edema     Data Review:       Labs:     Recent Labs   Lab 01/05/25  0524 01/06/25  0514 01/07/25  0436   RBC 3.72* 3.89 4.13   HGB 11.7* 11.5* 12.3   HCT 34.1* 36.3 36.8   MCV 91.7 93.3 89.1   MCH 31.5 29.6 29.8   MCHC 34.3 31.7 33.4   RDW 13.5 13.5 13.2   NEPRELIM 4.81 4.46 4.93   WBC 6.8 6.9 7.3   .0 227.0 238.0         Recent Labs   Lab 01/05/25  0525 01/06/25  0514 01/07/25  0436   * 110* 123*   BUN 11 5* 6*   CREATSERUM 0.56 0.46* 0.50*   EGFRCR 86 90 88   CA 8.8 8.9 9.2    140 137   K 4.0  4.0 3.7 3.7    103 100   CO2 32.0 31.0 31.0       No results for input(s): \"ALT\", \"AST\", \"ALB\",  \"AMYLASE\", \"LIPASE\", \"LDH\" in the last 168 hours.    Invalid input(s): \"ALPHOS\", \"TBIL\", \"DBIL\", \"TPROT\"      Imaging:  CT ABDOMEN+PELVIS(CPT=74176)    Result Date: 1/4/2025  CONCLUSION:  1. Gastric distension is similar to the prior radiographs without evidence of bowel obstruction.  This finding is nonspecific but could relate to gastroparesis. 2. Multi-vessel coronary atherosclerosis and small bilateral pleural effusions, possibly related to mild CHF or fluid overload.  Multifocal consolidation at the lung bases may relate to passive atelectasis and/or pneumonia including aspiration pneumonia. 3. Distended urinary bladder, possibly relating to urinary retention.  No hydronephrosis. 4. Pancreas is diffusely atrophic with mild ductal dilatation.  The 2.5 cm complex cyst noted in the pancreatic head on the previous ultrasound is difficult to evaluate without IV contrast but may still be present.  It is uncertain if these findings relate to chronic pancreatitis with a complex pseudocyst or a cystic neoplasm such as an IPMN. 5. Uncomplicated pancolonic and duodenal diverticulosis. 6. Lesser incidental findings as above.    Dictated by (CST): Clarence Asencio MD on 1/04/2025 at 5:05 PM     Finalized by (CST): Clarence Asencio MD on 1/04/2025 at 5:13 PM          XR ABDOMEN (1 VIEW) (CPT=74018)    Result Date: 1/4/2025  CONCLUSION:   Multiple distended loops of small bowel measuring up to 3 cm within the right upper and lower quadrants.  Severely distended rounded loop of bowel within the mid abdomen suggestive of stomach.  CT of the abdomen and pelvis suggested for further characterization.  Gas seen within a distended loop of rectum.     Dictated by (CST): Fortino Preston MD on 1/04/2025 at 1:28 PM     Finalized by (CST): Fortino Preston MD on 1/04/2025 at 1:29 PM             Meds:      calcium carbonate  500 mg Oral TID    dextromethorphan-guaifenesin ER  1 tablet Oral BID    atorvastatin  40 mg Oral Nightly    aspirin   81 mg Oral Daily    gabapentin  300 mg Oral Nightly    metoprolol succinate ER  50 mg Oral QAM    mycophenolate mofetil  500 mg Oral BID    pantoprazole  40 mg Oral QAM    lipase-protease-amylase (Lip-Prot-Amyl)  40,000 Units Oral TID CC    heparin  5,000 Units Subcutaneous Q8H ANGELA             metoclopramide    ipratropium-albuterol    acetaminophen    polyethylene glycol (PEG 3350)    sennosides    bisacodyl    fleet enema    ondansetron

## 2025-01-07 NOTE — PLAN OF CARE
Patient has been a stand and pivot transfer. Was able to sit in the chair today. Mild pain this afternoon to right shoulder and PO Tylenol given. Has been advanced to soft/low-fat diet. Tolerating it well. Has had bowel movements on shift, has purewick in place and is incontinent. DC plan pending rehab facility.     Problem: Patient Centered Care  Goal: Patient preferences are identified and integrated in the patient's plan of care  Description: Interventions:  - What would you like us to know as we care for you? I am feeling better   - Provide timely, complete, and accurate information to patient/family  - Incorporate patient and family knowledge, values, beliefs, and cultural backgrounds into the planning and delivery of care  - Encourage patient/family to participate in care and decision-making at the level they choose  - Honor patient and family perspectives and choices  Outcome: Progressing     Problem: Patient/Family Goals  Goal: Patient/Family Long Term Goal  Description: Patient's Long Term Goal: to be free of infection.    Interventions:  - iv and oral antibiotics given.  - See additional Care Plan goals for specific interventions  Outcome: Progressing     Problem: PAIN - ADULT  Goal: Verbalizes/displays adequate comfort level or patient's stated pain goal  Description: INTERVENTIONS:  - Encourage pt to monitor pain and request assistance  - Assess pain using appropriate pain scale  - Administer analgesics based on type and severity of pain and evaluate response  - Implement non-pharmacological measures as appropriate and evaluate response  - Consider cultural and social influences on pain and pain management  - Manage/alleviate anxiety  - Utilize distraction and/or relaxation techniques  - Monitor for opioid side effects  - Notify MD/LIP if interventions unsuccessful or patient reports new pain  - Anticipate increased pain with activity and pre-medicate as appropriate  Outcome: Progressing     Problem:  RISK FOR INFECTION - ADULT  Goal: Absence of fever/infection during anticipated neutropenic period  Description: INTERVENTIONS  - Monitor WBC  - Administer growth factors as ordered  - Implement neutropenic guidelines  Outcome: Progressing     Problem: SAFETY ADULT - FALL  Goal: Free from fall injury  Description: INTERVENTIONS:  - Assess pt frequently for physical needs  - Identify cognitive and physical deficits and behaviors that affect risk of falls.  - [place] fall precautions as indicated by assessment.  - Educate pt/family on patient safety including physical limitations  - Instruct pt to call for assistance with activity based on assessment  - Modify environment to reduce risk of injury  - Provide assistive devices as appropriate  - Consider OT/PT consult to assist with strengthening/mobility  - Encourage toileting schedule  Outcome: Progressing     Problem: DISCHARGE PLANNING  Goal: Discharge to home or other facility with appropriate resources  Description: INTERVENTIONS:  - Identify barriers to discharge w/pt and caregiver  - Include patient/family/discharge partner in discharge planning  - Arrange for needed discharge resources and transportation as appropriate  - Identify discharge learning needs (meds, wound care, etc)  - Arrange for interpreters to assist at discharge as needed  - Consider post-discharge preferences of patient/family/discharge partner  - Complete POLST form as appropriate  - Assess patient's ability to be responsible for managing their own health  - Refer to Case Management Department for coordinating discharge planning if the patient needs post-hospital services based on physician/LIP order or complex needs related to functional status, cognitive ability or social support system  Outcome: Progressing

## 2025-01-07 NOTE — PLAN OF CARE
Patient Aox4 on RA. 1x walker. Refusing Hep subcutaneous. Voiding via the purewick. VSS. Plan for Rehab pend choice.      Problem: Patient Centered Care  Goal: Patient preferences are identified and integrated in the patient's plan of care  Description: Interventions:  - What would you like us to know as we care for you?   - Provide timely, complete, and accurate information to patient/family  - Incorporate patient and family knowledge, values, beliefs, and cultural backgrounds into the planning and delivery of care  - Encourage patient/family to participate in care and decision-making at the level they choose  - Honor patient and family perspectives and choices  Outcome: Progressing     Problem: PAIN - ADULT  Goal: Verbalizes/displays adequate comfort level or patient's stated pain goal  Description: INTERVENTIONS:  - Encourage pt to monitor pain and request assistance  - Assess pain using appropriate pain scale  - Administer analgesics based on type and severity of pain and evaluate response  - Implement non-pharmacological measures as appropriate and evaluate response  - Consider cultural and social influences on pain and pain management  - Manage/alleviate anxiety  - Utilize distraction and/or relaxation techniques  - Monitor for opioid side effects  - Notify MD/LIP if interventions unsuccessful or patient reports new pain  - Anticipate increased pain with activity and pre-medicate as appropriate  Outcome: Progressing     Problem: RISK FOR INFECTION - ADULT  Goal: Absence of fever/infection during anticipated neutropenic period  Description: INTERVENTIONS  - Monitor WBC  - Administer growth factors as ordered  - Implement neutropenic guidelines  Outcome: Progressing     Problem: SAFETY ADULT - FALL  Goal: Free from fall injury  Description: INTERVENTIONS:  - Assess pt frequently for physical needs  - Identify cognitive and physical deficits and behaviors that affect risk of falls.  - [place] fall precautions  as indicated by assessment.  - Educate pt/family on patient safety including physical limitations  - Instruct pt to call for assistance with activity based on assessment  - Modify environment to reduce risk of injury  - Provide assistive devices as appropriate  - Consider OT/PT consult to assist with strengthening/mobility  - Encourage toileting schedule  Outcome: Progressing     Problem: DISCHARGE PLANNING  Goal: Discharge to home or other facility with appropriate resources  Description: INTERVENTIONS:  - Identify barriers to discharge w/pt and caregiver  - Include patient/family/discharge partner in discharge planning  - Arrange for needed discharge resources and transportation as appropriate  - Identify discharge learning needs (meds, wound care, etc)  - Arrange for interpreters to assist at discharge as needed  - Consider post-discharge preferences of patient/family/discharge partner  - Complete POLST form as appropriate  - Assess patient's ability to be responsible for managing their own health  - Refer to Case Management Department for coordinating discharge planning if the patient needs post-hospital services based on physician/LIP order or complex needs related to functional status, cognitive ability or social support system  Outcome: Progressing

## 2025-01-08 LAB
ANION GAP SERPL CALC-SCNC: 7 MMOL/L (ref 0–18)
BASOPHILS # BLD AUTO: 0.05 X10(3) UL (ref 0–0.2)
BASOPHILS NFR BLD AUTO: 0.8 %
BUN BLD-MCNC: 7 MG/DL (ref 9–23)
BUN/CREAT SERPL: 11.9 (ref 10–20)
CALCIUM BLD-MCNC: 9.3 MG/DL (ref 8.7–10.4)
CHLORIDE SERPL-SCNC: 100 MMOL/L (ref 98–112)
CO2 SERPL-SCNC: 29 MMOL/L (ref 21–32)
CREAT BLD-MCNC: 0.59 MG/DL
DEPRECATED RDW RBC AUTO: 44.4 FL (ref 35.1–46.3)
EGFRCR SERPLBLD CKD-EPI 2021: 85 ML/MIN/1.73M2 (ref 60–?)
EOSINOPHIL # BLD AUTO: 0.36 X10(3) UL (ref 0–0.7)
EOSINOPHIL NFR BLD AUTO: 5.7 %
ERYTHROCYTE [DISTWIDTH] IN BLOOD BY AUTOMATED COUNT: 13.4 % (ref 11–15)
GLUCOSE BLD-MCNC: 103 MG/DL (ref 70–99)
HCT VFR BLD AUTO: 37.4 %
HGB BLD-MCNC: 12.8 G/DL
IMM GRANULOCYTES # BLD AUTO: 0.06 X10(3) UL (ref 0–1)
IMM GRANULOCYTES NFR BLD: 0.9 %
LYMPHOCYTES # BLD AUTO: 1.4 X10(3) UL (ref 1–4)
LYMPHOCYTES NFR BLD AUTO: 22 %
MAGNESIUM SERPL-MCNC: 1.5 MG/DL (ref 1.6–2.6)
MCH RBC QN AUTO: 31.4 PG (ref 26–34)
MCHC RBC AUTO-ENTMCNC: 34.2 G/DL (ref 31–37)
MCV RBC AUTO: 91.7 FL
MONOCYTES # BLD AUTO: 0.58 X10(3) UL (ref 0.1–1)
MONOCYTES NFR BLD AUTO: 9.1 %
NEUTROPHILS # BLD AUTO: 3.92 X10 (3) UL (ref 1.5–7.7)
NEUTROPHILS # BLD AUTO: 3.92 X10(3) UL (ref 1.5–7.7)
NEUTROPHILS NFR BLD AUTO: 61.5 %
OSMOLALITY SERPL CALC.SUM OF ELEC: 280 MOSM/KG (ref 275–295)
PLATELET # BLD AUTO: 227 10(3)UL (ref 150–450)
POTASSIUM SERPL-SCNC: 4.4 MMOL/L (ref 3.5–5.1)
RBC # BLD AUTO: 4.08 X10(6)UL
SODIUM SERPL-SCNC: 136 MMOL/L (ref 136–145)
WBC # BLD AUTO: 6.4 X10(3) UL (ref 4–11)

## 2025-01-08 PROCEDURE — 83735 ASSAY OF MAGNESIUM: CPT | Performed by: INTERNAL MEDICINE

## 2025-01-08 PROCEDURE — 97110 THERAPEUTIC EXERCISES: CPT

## 2025-01-08 PROCEDURE — 80048 BASIC METABOLIC PNL TOTAL CA: CPT | Performed by: INTERNAL MEDICINE

## 2025-01-08 PROCEDURE — 85025 COMPLETE CBC W/AUTO DIFF WBC: CPT | Performed by: INTERNAL MEDICINE

## 2025-01-08 RX ORDER — MAGNESIUM OXIDE 400 MG/1
800 TABLET ORAL ONCE
Status: COMPLETED | OUTPATIENT
Start: 2025-01-08 | End: 2025-01-08

## 2025-01-08 RX ORDER — LACTOBACILLUS ACIDOPHILUS 500MM CELL
1 CAPSULE ORAL DAILY
Status: DISCONTINUED | OUTPATIENT
Start: 2025-01-08 | End: 2025-01-10

## 2025-01-08 NOTE — PLAN OF CARE
Pt is A&O x4. Breathing on room air. Voiding via Purewick. Having BM overnight. D.C. Plan is [facility] pending for choice and insurance autho. Call light within reach. Safety precaution in place.    Problem: Patient Centered Care  Goal: Patient preferences are identified and integrated in the patient's plan of care  Description: Interventions:  - What would you like us to know as we care for you?   - Provide timely, complete, and accurate information to patient/family  - Incorporate patient and family knowledge, values, beliefs, and cultural backgrounds into the planning and delivery of care  - Encourage patient/family to participate in care and decision-making at the level they choose  - Honor patient and family perspectives and choices  Outcome: Progressing     Problem: Patient/Family Goals  Goal: Patient/Family Long Term Goal  Description: Patient's Long Term Goal: to be free of infection.    Interventions:  - iv and oral antibiotics given.  - See additional Care Plan goals for specific interventions  Outcome: Progressing  Goal: Patient/Family Short Term Goal  Description: Patient's Short Term Goal: to have a clear plan for discharge.    Interventions:   - case management initiated.  - See additional Care Plan goals for specific interventions  Outcome: Progressing     Problem: PAIN - ADULT  Goal: Verbalizes/displays adequate comfort level or patient's stated pain goal  Description: INTERVENTIONS:  - Encourage pt to monitor pain and request assistance  - Assess pain using appropriate pain scale  - Administer analgesics based on type and severity of pain and evaluate response  - Implement non-pharmacological measures as appropriate and evaluate response  - Consider cultural and social influences on pain and pain management  - Manage/alleviate anxiety  - Utilize distraction and/or relaxation techniques  - Monitor for opioid side effects  - Notify MD/LIP if interventions unsuccessful or patient reports new pain  -  Anticipate increased pain with activity and pre-medicate as appropriate  Outcome: Progressing     Problem: RISK FOR INFECTION - ADULT  Goal: Absence of fever/infection during anticipated neutropenic period  Description: INTERVENTIONS  - Monitor WBC  - Administer growth factors as ordered  - Implement neutropenic guidelines  Outcome: Progressing     Problem: SAFETY ADULT - FALL  Goal: Free from fall injury  Description: INTERVENTIONS:  - Assess pt frequently for physical needs  - Identify cognitive and physical deficits and behaviors that affect risk of falls.  - [place] fall precautions as indicated by assessment.  - Educate pt/family on patient safety including physical limitations  - Instruct pt to call for assistance with activity based on assessment  - Modify environment to reduce risk of injury  - Provide assistive devices as appropriate  - Consider OT/PT consult to assist with strengthening/mobility  - Encourage toileting schedule  Outcome: Progressing     Problem: DISCHARGE PLANNING  Goal: Discharge to home or other facility with appropriate resources  Description: INTERVENTIONS:  - Identify barriers to discharge w/pt and caregiver  - Include patient/family/discharge partner in discharge planning  - Arrange for needed discharge resources and transportation as appropriate  - Identify discharge learning needs (meds, wound care, etc)  - Arrange for interpreters to assist at discharge as needed  - Consider post-discharge preferences of patient/family/discharge partner  - Complete POLST form as appropriate  - Assess patient's ability to be responsible for managing their own health  - Refer to Case Management Department for coordinating discharge planning if the patient needs post-hospital services based on physician/LIP order or complex needs related to functional status, cognitive ability or social support system  Outcome: Progressing

## 2025-01-08 NOTE — PHYSICAL THERAPY NOTE
PHYSICAL THERAPY TREATMENT NOTE - INPATIENT     Room Number: 423/423-A       Presenting Problem: pneumonia  Co-Morbidities : htn,myasthenia gravis    Problem List  Principal Problem:    Community acquired pneumonia of left lower lobe of lung  Active Problems:    Hyponatremia    Leukocytosis    Azotemia    Hyperglycemia      PHYSICAL THERAPY ASSESSMENT   Patient demonstrates fair progress this session, goals  remain in progress.      Patient is requiring minimal assist as a result of the following impairments: decreased functional strength, decreased endurance/aerobic capacity, impaired standing balance, impaired motor planning, decreased muscular endurance, and medical status.     Patient continues to function below baseline with bed mobility, transfers, gait, maintaining seated position, standing prolonged periods, and performing household tasks.  Next session anticipate patient to progress bed mobility, transfers, gait, maintaining seated position, standing prolonged periods, and performing household tasks.  Physical Therapy will continue to follow patient for duration of hospitalization.    Patient continues to benefit from continued skilled PT services: to promote return to prior level of function and safety with continuous assistance and gradual rehabilitative therapy .    PLAN DURING HOSPITALIZATION  Nursing Mobility Recommendation : 1 Assist     PT Treatment Plan: Bed mobility;Body mechanics;Endurance;Energy conservation;Patient education;Gait training;Strengthening;Transfer training;Balance training  Frequency (Obs): 3-5x/week     SUBJECTIVE  I feel like after I eat I have problems with diarrhea if I stand trying to walk so I prefer to rest back in bed for a while. I will ask for help back to chair a little later before my dinner.     OBJECTIVE  Precautions: Bed/chair alarm    WEIGHT BEARING RESTRICTION       PAIN ASSESSMENT   Ratin  Location:  (low back ache during coughing)       BALANCE  Static  Sitting: Fair +  Dynamic Sitting: Fair  Static Standing: Fair -  Dynamic Standing: Poor +    ACTIVITY TOLERANCE                          O2 WALK       AM-PAC '6-Clicks' INPATIENT SHORT FORM - BASIC MOBILITY  How much difficulty does the patient currently have...  Patient Difficulty: Turning over in bed (including adjusting bedclothes, sheets and blankets)?: A Little   Patient Difficulty: Sitting down on and standing up from a chair with arms (e.g., wheelchair, bedside commode, etc.): A Little   Patient Difficulty: Moving from lying on back to sitting on the side of the bed?: A Little   How much help from another person does the patient currently need...   Help from Another: Moving to and from a bed to a chair (including a wheelchair)?: A Little   Help from Another: Need to walk in hospital room?: A Little   Help from Another: Climbing 3-5 steps with a railing?: A Lot     AM-PAC Score:  Raw Score: 17   Approx Degree of Impairment: 50.57%   Standardized Score (AM-PAC Scale): 42.13   CMS Modifier (G-Code): CK    FUNCTIONAL ABILITY STATUS  Functional Mobility/Gait Assessment  Gait Assistance: Minimum assistance  Distance (ft): SPT  Assistive Device: Rolling walker  Pattern: Shuffle  Rolling: supervision  Supine to Sit: minimal assist  Sit to Supine: minimal assist  Sit to Stand: minimal assist    Skilled Therapy Provided: Pt ed with a review of LE therex in bed for strength and ROM. Pt assist with min A for SPT from chair to bed to allow rest after eating as per pt request. Pt reports feeling limited by diarrhea after means with movement. Pt is on track for GR with PT as medical progress allows.     The patient's Approx Degree of Impairment: 50.57% has been calculated based on documentation in the [facility] '6 clicks' Inpatient Daily Activity Short Form.  Research supports that patients with this level of impairment may benefit from IP rehab with PT, OT.    Final disposition will be made by interdisciplinary medical  team.    THERAPEUTIC EXERCISES  Lower Extremity Ankle pumps  Heel slides  LAQ     Position Sitting and Supine       Patient End of Session: In bed;With  staff;Call light within reach;Needs met;RN aware of session/findings;All patient questions and concerns addressed;Alarm set    CURRENT GOALS   Goals to be met by: 1/16/25  Patient Goal Patient's self-stated goal is: to go home   Goal #1 Patient is able to demonstrate supine - sit EOB @ level: independent      Goal #1   Current Status [place]/ModA   Goal #2 Patient is able to demonstrate transfers Sit to/from Stand at assistance level: supervision with none      Goal #2  Current Status Min A/Mod A with RW   Goal #3 Patient is able to ambulate 50 feet with assist device: walker - rolling at assistance level: supervision   Goal #3   Current Status Min/Mod A with RW 20ft with chair follow   Goal #4 Patient to demonstrate independence with home activity/exercise instructions provided to patient in preparation for discharge.   Goal #4   Current Status instructed     Therapeutic Activity: 10 minutes

## 2025-01-08 NOTE — PLAN OF CARE
Patient has been a stand and pivot transfer. Was able to sit in the chair today. No complaints of pain today. Has been advanced to soft/low-fat diet. Tolerating it well. Has had bowel movements on shift, has purewick in place and is incontinent. DC plan pending rehab facility.     Problem: Patient Centered Care  Goal: Patient preferences are identified and integrated in the patient's plan of care  Description: Interventions:  - What would you like us to know as we care for you? I am feeling a bit better   - Provide timely, complete, and accurate information to patient/family  - Incorporate patient and family knowledge, values, beliefs, and cultural backgrounds into the planning and delivery of care  - Encourage patient/family to participate in care and decision-making at the level they choose  - Honor patient and family perspectives and choices  Outcome: Progressing     Problem: Patient/Family Goals  Goal: Patient/Family Long Term Goal  Description: Patient's Long Term Goal: to be free of infection.    Interventions:  - iv and oral antibiotics given.  - See additional Care Plan goals for specific interventions  Outcome: Progressing  Goal: Patient/Family Short Term Goal  Description: Patient's Short Term Goal: to have a clear plan for discharge.    Interventions:   - case management initiated.  - See additional Care Plan goals for specific interventions  Outcome: Progressing     Problem: PAIN - ADULT  Goal: Verbalizes/displays adequate comfort level or patient's stated pain goal  Description: INTERVENTIONS:  - Encourage pt to monitor pain and request assistance  - Assess pain using appropriate pain scale  - Administer analgesics based on type and severity of pain and evaluate response  - Implement non-pharmacological measures as appropriate and evaluate response  - Consider cultural and social influences on pain and pain management  - Manage/alleviate anxiety  - Utilize distraction and/or relaxation techniques  -  Monitor for opioid side effects  - Notify MD/LIP if interventions unsuccessful or patient reports new pain  - Anticipate increased pain with activity and pre-medicate as appropriate  Outcome: Progressing     Problem: RISK FOR INFECTION - ADULT  Goal: Absence of fever/infection during anticipated neutropenic period  Description: INTERVENTIONS  - Monitor WBC  - Administer growth factors as ordered  - Implement neutropenic guidelines  Outcome: Progressing     Problem: SAFETY ADULT - FALL  Goal: Free from fall injury  Description: INTERVENTIONS:  - Assess pt frequently for physical needs  - Identify cognitive and physical deficits and behaviors that affect risk of falls.  - [place] fall precautions as indicated by assessment.  - Educate pt/family on patient safety including physical limitations  - Instruct pt to call for assistance with activity based on assessment  - Modify environment to reduce risk of injury  - Provide assistive devices as appropriate  - Consider OT/PT consult to assist with strengthening/mobility  - Encourage toileting schedule  Outcome: Progressing     Problem: DISCHARGE PLANNING  Goal: Discharge to home or other facility with appropriate resources  Description: INTERVENTIONS:  - Identify barriers to discharge w/pt and caregiver  - Include patient/family/discharge partner in discharge planning  - Arrange for needed discharge resources and transportation as appropriate  - Identify discharge learning needs (meds, wound care, etc)  - Arrange for interpreters to assist at discharge as needed  - Consider post-discharge preferences of patient/family/discharge partner  - Complete POLST form as appropriate  - Assess patient's ability to be responsible for managing their own health  - Refer to Case Management Department for coordinating discharge planning if the patient needs post-hospital services based on physician/LIP order or complex needs related to functional status, cognitive ability or social  support system  Outcome: Progressing

## 2025-01-08 NOTE — PROGRESS NOTES
[facility] Hospitalist Progress Note     CC: Hospital Follow up    PCP: No primary care provider on file.       Assessment/Plan:     Principal Problem:    Community acquired pneumonia of left lower lobe of lung  Active Problems:    Hyponatremia    Leukocytosis    Azotemia    Hyperglycemia    Patient is a 89 year old female with PMH Hypertension, Hyperlipidemia, Pancreatic insufficiency, Myasthenia Gravis who presents with approximately 7 to 9 days of increasing URI symptoms, chills and fatigue.  Admitted with PNA and positive for parainfluenza and norovirus.  Course complicated with nausea and abdominal distention now resolved and off antibiotics.     Community-acquired pneumonia   Parainfluenza   - retrocardiac infiltrate noted on CXR, presumed superimposed bacterial  - Repeat CXR with worsening edema in right side, IV lasix x 1   - Legionella negative  - mycoplasma negative   - Started dextromethorphan guaifenesin scheduled given increased sputum viscosity  - Trend wbc and fevers   - Augmentin 5 days complete   - Azithro 5 days complete   - off O2 now on RA  - IS, Up to chair     Nausea   Abdominal distention resolved   - stop imodium   - Bms have returned   - KUB and CT a/p reviewed with no ileus or SBO seen   - possible gastroparesis  - scheduled reglan complete    - ambulate  - advancing diet   - IVF complete       Acute on Chronic diarrhea   Pancreatic insuffiencey   - likely exacerbated with GI panel + for norovirus  - stop imodium 2/2 abd distention   - resume pancrelipase  - probiotic added     Myasthenia gravis without acute flare  - Continue home mycophenolate     Mild hyponatremia - resolved   Hypokalemia likely from emesis and poor PO intake - trend and replace as needed  - PO intake is still low with low glucose   - maintenance fluids complete     Hyperlipidemia  - Continue home statin     Essential hypertension  - Continue home metoprolol     Chronic weakness PT/OT eval  - [facility] on dc       FN:  - IVF: None  - Diet: General diet   - PT/OT     DVT Prophy: Heparin TID refusing. Understands the risk of VTE  Lines:PIV    Questions/concerns were discussed with patient and/or family by bedside.    Dispo: [facility] in the next 1-2 days     Thank You,  Angel Abernathy MD     Hospitalist with [facility]     Subjective:     Feels breathing is better   Bms have resumed, feels her belly is normal for her   feels tired but wants more activity   Wants to sit up as she is now getting heartburn     OBJECTIVE:    Blood pressure 145/67, pulse 90, temperature 97.6 °F (36.4 °C), temperature source Temporal, resp. rate 16, height 5' 1\" (1.549 m), weight 137 lb (62.1 kg), SpO2 90%.    Temp:  [97.4 °F (36.3 °C)-98.8 °F (37.1 °C)] 97.6 °F (36.4 °C)  Pulse:  [82-97] 90  Resp:  [16-18] 16  BP: (123-150)/(57-76) 145/67  SpO2:  [90 %-96 %] 90 %      Intake/Output:    Intake/Output Summary (Last 24 hours) at 1/8/2025 1205  Last data filed at 1/8/2025 0617  Gross per 24 hour   Intake 420 ml   Output 300 ml   Net 120 ml       Last 3 Weights   01/03/25 2359 137 lb (62.1 kg)   01/01/25 1016 137 lb (62.1 kg)   11/01/24 0913 142 lb 6.4 oz (64.6 kg)   10/18/24 2156 140 lb 9.6 oz (63.8 kg)   10/18/24 1840 140 lb (63.5 kg)       Exam   Gen: No acute distress, alert and oriented x3  Heent: NC AT, neck supple  Pulm: good air movement, no wheezes   CV: Heart with regular rate and rhythm, no peripheral edema  Abd: Abdomen soft, distention improved, + BS   Ext: b/l mild edema     Data Review:       Labs:     Recent Labs   Lab 01/06/25  0514 01/07/25  0436 01/08/25  0426   RBC 3.89 4.13 4.08   HGB 11.5* 12.3 12.8   HCT 36.3 36.8 37.4   MCV 93.3 89.1 91.7   MCH 29.6 29.8 31.4   MCHC 31.7 33.4 34.2   RDW 13.5 13.2 13.4   NEPRELIM 4.46 4.93 3.92   WBC 6.9 7.3 6.4   .0 238.0 227.0         Recent Labs   Lab 01/06/25  0514 01/07/25  0436 01/08/25  0426   * 123* 103*   BUN 5* 6* 7*   CREATSERUM 0.46* 0.50* 0.59   EGFRCR 90 88 85   CA  8.9 9.2 9.3    137 136   K 3.7 3.7 4.4    100 100   CO2 31.0 31.0 29.0       No results for input(s): \"ALT\", \"AST\", \"ALB\", \"AMYLASE\", \"LIPASE\", \"LDH\" in the last 168 hours.    Invalid input(s): \"ALPHOS\", \"TBIL\", \"DBIL\", \"TPROT\"      Imaging:  No results found.      Meds:      calcium carbonate  500 mg Oral TID    dextromethorphan-guaifenesin ER  1 tablet Oral BID    atorvastatin  40 mg Oral Nightly    aspirin  81 mg Oral Daily    gabapentin  300 mg Oral Nightly    metoprolol succinate ER  50 mg Oral QAM    mycophenolate mofetil  500 mg Oral BID    pantoprazole  40 mg Oral QAM    lipase-protease-amylase (Lip-Prot-Amyl)  40,000 Units Oral TID CC    heparin  5,000 Units Subcutaneous Q8H ANGELA             metoclopramide    ipratropium-albuterol    acetaminophen    polyethylene glycol (PEG 3350)    sennosides    bisacodyl    fleet enema    ondansetron

## 2025-01-09 LAB
ANION GAP SERPL CALC-SCNC: 7 MMOL/L (ref 0–18)
BASOPHILS # BLD AUTO: 0.05 X10(3) UL (ref 0–0.2)
BASOPHILS NFR BLD AUTO: 0.4 %
BUN BLD-MCNC: 14 MG/DL (ref 9–23)
BUN/CREAT SERPL: 20 (ref 10–20)
CALCIUM BLD-MCNC: 9 MG/DL (ref 8.7–10.4)
CHLORIDE SERPL-SCNC: 100 MMOL/L (ref 98–112)
CO2 SERPL-SCNC: 30 MMOL/L (ref 21–32)
CREAT BLD-MCNC: 0.7 MG/DL
DEPRECATED RDW RBC AUTO: 43.8 FL (ref 35.1–46.3)
EGFRCR SERPLBLD CKD-EPI 2021: 81 ML/MIN/1.73M2 (ref 60–?)
EOSINOPHIL # BLD AUTO: 0.18 X10(3) UL (ref 0–0.7)
EOSINOPHIL NFR BLD AUTO: 1.6 %
ERYTHROCYTE [DISTWIDTH] IN BLOOD BY AUTOMATED COUNT: 13.4 % (ref 11–15)
GLUCOSE BLD-MCNC: 102 MG/DL (ref 70–99)
HCT VFR BLD AUTO: 36 %
HGB BLD-MCNC: 12 G/DL
IMM GRANULOCYTES # BLD AUTO: 0.1 X10(3) UL (ref 0–1)
IMM GRANULOCYTES NFR BLD: 0.9 %
LYMPHOCYTES # BLD AUTO: 1.24 X10(3) UL (ref 1–4)
LYMPHOCYTES NFR BLD AUTO: 10.9 %
MAGNESIUM SERPL-MCNC: 1.6 MG/DL (ref 1.6–2.6)
MAGNESIUM SERPL-MCNC: 1.6 MG/DL (ref 1.6–2.6)
MCH RBC QN AUTO: 29.5 PG (ref 26–34)
MCHC RBC AUTO-ENTMCNC: 33.3 G/DL (ref 31–37)
MCV RBC AUTO: 88.5 FL
MONOCYTES # BLD AUTO: 0.8 X10(3) UL (ref 0.1–1)
MONOCYTES NFR BLD AUTO: 7.1 %
NEUTROPHILS # BLD AUTO: 8.97 X10 (3) UL (ref 1.5–7.7)
NEUTROPHILS # BLD AUTO: 8.97 X10(3) UL (ref 1.5–7.7)
NEUTROPHILS NFR BLD AUTO: 79.1 %
OSMOLALITY SERPL CALC.SUM OF ELEC: 285 MOSM/KG (ref 275–295)
PLATELET # BLD AUTO: 255 10(3)UL (ref 150–450)
POTASSIUM SERPL-SCNC: 4.2 MMOL/L (ref 3.5–5.1)
RBC # BLD AUTO: 4.07 X10(6)UL
SODIUM SERPL-SCNC: 137 MMOL/L (ref 136–145)
WBC # BLD AUTO: 11.3 X10(3) UL (ref 4–11)

## 2025-01-09 PROCEDURE — 80048 BASIC METABOLIC PNL TOTAL CA: CPT | Performed by: INTERNAL MEDICINE

## 2025-01-09 PROCEDURE — 85025 COMPLETE CBC W/AUTO DIFF WBC: CPT | Performed by: INTERNAL MEDICINE

## 2025-01-09 PROCEDURE — 97110 THERAPEUTIC EXERCISES: CPT

## 2025-01-09 PROCEDURE — 83735 ASSAY OF MAGNESIUM: CPT | Performed by: INTERNAL MEDICINE

## 2025-01-09 PROCEDURE — 94760 N-INVAS EAR/PLS OXIMETRY 1: CPT

## 2025-01-09 NOTE — PROGRESS NOTES
[facility] Hospitalist Progress Note     CC: Hospital Follow up    PCP: No primary care provider on file.       Assessment/Plan:     Principal Problem:    Community acquired pneumonia of left lower lobe of lung  Active Problems:    Hyponatremia    Leukocytosis    Azotemia    Hyperglycemia    Patient is a 89 year old female with PMH Hypertension, Hyperlipidemia, Pancreatic insufficiency, Myasthenia Gravis who presents with approximately 7 to 9 days of increasing URI symptoms, chills and fatigue.  Admitted with PNA and positive for parainfluenza and norovirus.  Course complicated with nausea and abdominal distention now resolved and off antibiotics.     Community-acquired pneumonia   Parainfluenza   - retrocardiac infiltrate noted on CXR, presumed superimposed bacterial  - Repeat CXR with worsening edema in right side, IV lasix x 1   - Legionella negative  - mycoplasma negative   - Started dextromethorphan guaifenesin scheduled given increased sputum viscosity  - Trend wbc and fevers   - Augmentin 5 days complete   - Azithro 5 days complete   - off O2 now on RA  - IS, Up to chair   -Doing much better     Nausea   Abdominal distention resolved   - stop imodium   - Bms have returned   - KUB and CT a/p reviewed with no ileus or SBO seen   - possible gastroparesis  - scheduled reglan complete    - ambulate  - advancing diet   - IVF complete     -Maybe medication related, as she seems to be describing reflux  Was to go to [facility] today but no Iso bed     Acute on Chronic diarrhea   Pancreatic insuffiencey   - likely exacerbated with GI panel + for norovirus  - stop imodium 2/2 abd distention   - resume pancrelipase  - probiotic added - maybe a cause of her belching ?    Myasthenia gravis without acute flare  - Continue home mycophenolate     Mild hyponatremia - resolved   Hypokalemia likely from emesis and poor PO intake - trend and replace as needed  - PO intake is still low with low glucose   - maintenance fluids  complete     Hyperlipidemia  - Continue home statin     Essential hypertension  - Continue home metoprolol     Chronic weakness PT/OT eval  - [facility] on dc      FN:  - IVF: None  - Diet: General diet   - PT/OT     DVT Prophy: Heparin TID refusing. Understands the risk of VTE  Lines:PIV    Questions/concerns were discussed with patient and/or family by bedside.    Dispo: [facility] pending bed availability     Thank You,  Hugo Lira DO     Hospitalist with [facility]     Subjective:     Feels well, leg edema better, no SOB, mild coughing still, feels some nausea at night and gas, no abd pain, diarrhea is better     OBJECTIVE:    Blood pressure 125/82, pulse 85, temperature 97.8 °F (36.6 °C), temperature source Temporal, resp. rate 18, height 5' 1\" (1.549 m), weight 137 lb (62.1 kg), SpO2 94%.    Temp:  [97.6 °F (36.4 °C)-97.8 °F (36.6 °C)] 97.8 °F (36.6 °C)  Pulse:  [85-93] 85  Resp:  [16-18] 18  BP: (125-155)/(64-88) 125/82  SpO2:  [90 %-94 %] 94 %      Intake/Output:    Intake/Output Summary (Last 24 hours) at 1/9/2025 1438  Last data filed at 1/9/2025 1345  Gross per 24 hour   Intake --   Output 1000 ml   Net -1000 ml       Last 3 Weights   01/03/25 2359 137 lb (62.1 kg)   01/01/25 1016 137 lb (62.1 kg)   11/01/24 0913 142 lb 6.4 oz (64.6 kg)   10/18/24 2156 140 lb 9.6 oz (63.8 kg)   10/18/24 1840 140 lb (63.5 kg)       Exam   Gen: No acute distress, alert and oriented x3  Pulm: good air movement, no wheezes   CV: Heart with regular rate and rhythm, no peripheral edema  Abd: Abdomen soft, distention improved, + BS   Ext: trace LE edema     Data Review:       Labs:     Recent Labs   Lab 01/07/25  0436 01/08/25  0426 01/09/25  0513   RBC 4.13 4.08 4.07   HGB 12.3 12.8 12.0   HCT 36.8 37.4 36.0   MCV 89.1 91.7 88.5   MCH 29.8 31.4 29.5   MCHC 33.4 34.2 33.3   RDW 13.2 13.4 13.4   NEPRELIM 4.93 3.92 8.97*   WBC 7.3 6.4 11.3*   .0 227.0 255.0         Recent Labs   Lab 01/07/25  0436 01/08/25  0422  01/09/25  0512   * 103* 102*   BUN 6* 7* 14   CREATSERUM 0.50* 0.59 0.70   EGFRCR 88 85 81   CA 9.2 9.3 9.0    136 137   K 3.7 4.4 4.2    100 100   CO2 31.0 29.0 30.0       No results for input(s): \"ALT\", \"AST\", \"ALB\", \"AMYLASE\", \"LIPASE\", \"LDH\" in the last 168 hours.    Invalid input(s): \"ALPHOS\", \"TBIL\", \"DBIL\", \"TPROT\"      Imaging:  No results found.      Meds:      acidophilus  1 each Oral Daily    calcium carbonate  500 mg Oral TID    dextromethorphan-guaifenesin ER  1 tablet Oral BID    atorvastatin  40 mg Oral Nightly    aspirin  81 mg Oral Daily    gabapentin  300 mg Oral Nightly    metoprolol succinate ER  50 mg Oral QAM    mycophenolate mofetil  500 mg Oral BID    pantoprazole  40 mg Oral QAM    lipase-protease-amylase (Lip-Prot-Amyl)  40,000 Units Oral TID CC    heparin  5,000 Units Subcutaneous Q8H ANGELA             metoclopramide    ipratropium-albuterol    acetaminophen    polyethylene glycol (PEG 3350)    sennosides    bisacodyl    fleet enema    ondansetron

## 2025-01-09 NOTE — PLAN OF CARE
Patient has been a stand and pivot transfer. Was able to sit in the chair today. No complaints of pain today. Is on soft/low-fat diet. Tolerating it well. Has had bowel movements on shift, has purewick in place and is incontinent. DC plan pending rehab facility.     Problem: Patient Centered Care  Goal: Patient preferences are identified and integrated in the patient's plan of care  Description: Interventions:  - What would you like us to know as we care for you? I am ready to go to rehab   - Provide timely, complete, and accurate information to patient/family  - Incorporate patient and family knowledge, values, beliefs, and cultural backgrounds into the planning and delivery of care  - Encourage patient/family to participate in care and decision-making at the level they choose  - Honor patient and family perspectives and choices  Outcome: Progressing     Problem: Patient/Family Goals  Goal: Patient/Family Long Term Goal  Description: Patient's Long Term Goal: to be free of infection.    Interventions:  - iv and oral antibiotics given.  - See additional Care Plan goals for specific interventions  Outcome: Progressing  Goal: Patient/Family Short Term Goal  Description: Patient's Short Term Goal: to have a clear plan for discharge.    Interventions:   - case management initiated.  - See additional Care Plan goals for specific interventions  Outcome: Progressing     Problem: PAIN - ADULT  Goal: Verbalizes/displays adequate comfort level or patient's stated pain goal  Description: INTERVENTIONS:  - Encourage pt to monitor pain and request assistance  - Assess pain using appropriate pain scale  - Administer analgesics based on type and severity of pain and evaluate response  - Implement non-pharmacological measures as appropriate and evaluate response  - Consider cultural and social influences on pain and pain management  - Manage/alleviate anxiety  - Utilize distraction and/or relaxation techniques  - Monitor for opioid  side effects  - Notify MD/LIP if interventions unsuccessful or patient reports new pain  - Anticipate increased pain with activity and pre-medicate as appropriate  Outcome: Progressing     Problem: RISK FOR INFECTION - ADULT  Goal: Absence of fever/infection during anticipated neutropenic period  Description: INTERVENTIONS  - Monitor WBC  - Administer growth factors as ordered  - Implement neutropenic guidelines  Outcome: Progressing     Problem: SAFETY ADULT - FALL  Goal: Free from fall injury  Description: INTERVENTIONS:  - Assess pt frequently for physical needs  - Identify cognitive and physical deficits and behaviors that affect risk of falls.  - [place] fall precautions as indicated by assessment.  - Educate pt/family on patient safety including physical limitations  - Instruct pt to call for assistance with activity based on assessment  - Modify environment to reduce risk of injury  - Provide assistive devices as appropriate  - Consider OT/PT consult to assist with strengthening/mobility  - Encourage toileting schedule  Outcome: Progressing     Problem: DISCHARGE PLANNING  Goal: Discharge to home or other facility with appropriate resources  Description: INTERVENTIONS:  - Identify barriers to discharge w/pt and caregiver  - Include patient/family/discharge partner in discharge planning  - Arrange for needed discharge resources and transportation as appropriate  - Identify discharge learning needs (meds, wound care, etc)  - Arrange for interpreters to assist at discharge as needed  - Consider post-discharge preferences of patient/family/discharge partner  - Complete POLST form as appropriate  - Assess patient's ability to be responsible for managing their own health  - Refer to Case Management Department for coordinating discharge planning if the patient needs post-hospital services based on physician/LIP order or complex needs related to functional status, cognitive ability or social support  system  Outcome: Progressing

## 2025-01-09 NOTE — CONGREGATE LIVING REVIEW
[facility] Authorization    The [facility] Review Committee has reviewed this case and the patient IS APPROVED for discharge to a facility for Short Term Skilled once the following procedure is followed:     - The physician discharge instructions (contained within the SAL note for SNF) must inlcude the below appropriate and approved COVID instructions to the facility    For questions regarding CLRC approval process, please contact the CM assigned to the case.  For questions regarding RN discharge workflow, please contact the unit Clinical Leader.

## 2025-01-09 NOTE — CM/SW NOTE
01/07/25 1300   CM/SW Referral Data   Referral Source Physician;Social Work (self-referral)   Reason for Referral Discharge planning   Informant Patient   Medical Hx   Does patient have an established PCP? Yes   Patient Info   Patient's Current Mental Status at Time of Assessment Alert;Oriented   Patient's Home Environment Independent Living   Number of Levels in Home 1   Patient lives with Spouse/Significant other   Patient Status Prior to Admission   Independent with ADLs and Mobility Yes   Discharge Needs   Anticipated D/C needs Subacute rehab   Services Requested   Submitted to [facility] Yes   PASRR Level 1 Submitted Yes   PMR Consult Requested Not clinically appropriate at this time   Choice of Post-Acute Provider   Informed patient of right to choose their preferred provider Yes   List of appropriate post-acute services provided to patient/family with quality data Yes     SW spoke with pt and confirmed pt lives over at the [facility] in [place] with spouse    Pt is aware and agreeable to SNF - SW asked if pt is agreeable to [facility] since that is right next to the building however pt stating she would like to look at a list instead as she does not want her  to be walking over to the SNF building if possible    However pt also doesn't want a facility too far that her  cannot visit.     SNF list provided at bedside - will need final choice    PLAN: SNF - pending choice - list provided    Sendy VEE LSW, MSW ext. [phone]    
EFFIE followed up on DC planning.     EFFIE spoke with pt's first, second and third choice - [facility], [facility], and [facility]    All state they do not have an isolation room avail    [facility] of [facility] also stating they have no beds    EFFIE expanded the referral to see which facility is able to accept for an isolation room     Currently pending as no facilities have a private room avail for the pt    Addendum, 1/10/2025    EFFIE confirmed that E has an isolation room for the pt today     SW received confirmation of insurance authorization. EFFIE confirmed with RN that pt is medically ready for discharge today. EFFIE discussed with efren to arrange a time for discharge. RN is aware of discharge time and location and will inform patient/ family. RN to attach IP Transfer Report to After Visit Summary packet for transfer to [facility].     EFFIE ordered transportation of a [facility] through [facility]. [facility] will transport the pt at 5:00 PM to [facility] form/ flow sheet completed. RN to attach and print out with After Visit Summary Packet.     [facility] [phone]    EFFIE confirmed PASR screen was completed. SW/CM to remain available for support and/or discharge planning.     PLAN: DC to [facility] via [facility] at 5:00 PM    RN to call report to BT/ELM at [phone].    Sendy VEE LSW, MSW ext. [phone]    
SW followed up on DC planning.     SW received message from therapy stating Anticipated therapy need: Gradual Rehabilitative Therapy    SW sent referral in aidin - pending accepting facility - will open and discuss with the pt once avail     PLAN: [facility] pending facility and pt choice    Sendy VEE LSW, MSW ext. [phone]      
Spoke with  patient re: SNF choice- list reviewed.  Patient believes her  [facility]st choice [facility]- however, she requested CM call [facility] [phone] with decision.  left requesting call back.     Pepper MARTINEZ, 01/08/25, 2:24 PM      Update:    Met with Daniel 1st. Choice is [facility]- updated they do not have ISO bed.  2nd Choice- [facility]- pending medical clearance.     [facility] notified of tentative choice by patient/family. Pepper MARTINEZ, 01/08/25, 3:03 PM    
verbal instruction/written material

## 2025-01-09 NOTE — PHYSICAL THERAPY NOTE
PHYSICAL THERAPY TREATMENT NOTE - INPATIENT     Room Number: 423/423-A       Presenting Problem: pneumonia  Co-Morbidities : htn,myasthenia gravis    Problem List  Principal Problem:    Community acquired pneumonia of left lower lobe of lung  Active Problems:    Hyponatremia    Leukocytosis    Azotemia    Hyperglycemia      PHYSICAL THERAPY ASSESSMENT   Patient demonstrates fair progress this session, goals  remain in progress.      Patient is requiring minimal assist as a result of the following impairments: decreased functional strength, decreased endurance/aerobic capacity, pain, decreased muscular endurance, and medical status.     Patient continues to function below baseline with bed mobility, transfers, gait, maintaining seated position, standing prolonged periods, and performing household tasks.  Next session anticipate patient to progress bed mobility, transfers, gait, maintaining seated position, standing prolonged periods, and performing household tasks.  Physical Therapy will continue to follow patient for duration of hospitalization.  `  Patient continues to benefit from continued skilled PT services: to promote return to prior level of function and safety with continuous assistance and gradual rehabilitative therapy .    PLAN DURING HOSPITALIZATION  Nursing Mobility Recommendation : 1 Assist     PT Treatment Plan: Endurance;Energy conservation;Bed mobility;Body mechanics;Patient education;Gait training;Range of motion;Strengthening;Transfer training;Balance training  Frequency (Obs): 3-5x/week     SUBJECTIVE  I will get up out of bed a little later after I get cleaned up. I have been having some bowel incontinence. I only like to sit up right after getting cleaned up so I don't have any irritation down there. I can do my exercises right in bed.     OBJECTIVE  Precautions: Bed/chair alarm    WEIGHT BEARING RESTRICTION       PAIN ASSESSMENT   Ratin  Location: buttock c/o incontinence with iritation in  the rectal region  Management Techniques: Activity promotion;Body mechanics;Breathing techniques;Relaxation;Repositioning    BALANCE  Static Sitting: Fair +  Dynamic Sitting: Fair  Static Standing: Fair -  Dynamic Standing: Fair -    ACTIVITY TOLERANCE                          O2 WALK       AM-PAC '6-Clicks' INPATIENT SHORT FORM - BASIC MOBILITY  How much difficulty does the patient currently have...  Patient Difficulty: Turning over in bed (including adjusting bedclothes, sheets and blankets)?: A Little   Patient Difficulty: Sitting down on and standing up from a chair with arms (e.g., wheelchair, bedside commode, etc.): A Little   Patient Difficulty: Moving from lying on back to sitting on the side of the bed?: A Little   How much help from another person does the patient currently need...   Help from Another: Moving to and from a bed to a chair (including a wheelchair)?: A Little   Help from Another: Need to walk in hospital room?: A Little   Help from Another: Climbing 3-5 steps with a railing?: A Lot     AM-PAC Score:  Raw Score: 17   Approx Degree of Impairment: 50.57%   Standardized Score (AM-PAC Scale): 42.13   CMS Modifier (G-Code): CK    FUNCTIONAL ABILITY STATUS  Functional Mobility/Gait Assessment  Gait Assistance: Not tested (pt requesting rest in bed to get cleaned up prior to sitting in the chair)  Distance (ft): SPT  Assistive Device: Rolling walker  Pattern: Shuffle  Rolling: minimal assist  Supine to Sit: minimal assist  Sit to Supine: minimal assist  Sit to Stand: minimal assist    Skilled Therapy Provided: Pt ed with therex in the bed for LE strengthening and AROM. Pt ed with repositioning in bed to avoid shearing on buttock to protect skin and to improve comfort with mobility. Pt self limits what she will do during PT session d/t bowel incontinence. Pt is on track for GR with PT, OT as medical progress allows tor regain her maximal PLOF and safety.     The patient's Approx Degree of Impairment:  50.57% has been calculated based on documentation in the [facility] '6 clicks' Inpatient Daily Activity Short Form.  Research supports that patients with this level of impairment may benefit from Ip rehab with PT, OT.    Final disposition will be made by interdisciplinary medical team.    THERAPEUTIC EXERCISES  Lower Extremity Alternating marching  Ankle pumps  LAQ     Position Sitting & Standing       Patient End of Session: Up in chair;With  staff;Needs met;Call light within reach;RN aware of session/findings;All patient questions and concerns addressed;Alarm set    CURRENT GOALS   CURRENT GOALS   Goals to be met by: 1/16/25  Patient Goal Patient's self-stated goal is: to go home   Goal #1 Patient is able to demonstrate supine - sit EOB @ level: independent      Goal #1   Current Status Min A   Goal #2 Patient is able to demonstrate transfers Sit to/from Stand at assistance level: supervision with none      Goal #2  Current Status Min A with RW   Goal #3 Patient is able to ambulate 50 feet with assist device: walker - rolling at assistance level: supervision   Goal #3   Current Status Min/Mod A with RW 20ft with chair follow   Goal #4 Patient to demonstrate independence with home activity/exercise instructions provided to patient in preparation for discharge.   Goal #4   Current Status instructed     Therapeutic Exercise: 10 minutes

## 2025-01-09 NOTE — PLAN OF CARE
Patient has safety precautions in place bed in the lowest position, bed alarm on, and call light within reach. Plan of care ongoing. No further concerns as of present.    Problem: Patient Centered Care  Goal: Patient preferences are identified and integrated in the patient's plan of care  Description: Interventions:  - Provide timely, complete, and accurate information to patient/family  - Incorporate patient and family knowledge, values, beliefs, and cultural backgrounds into the planning and delivery of care  - Encourage patient/family to participate in care and decision-making at the level they choose  - Honor patient and family perspectives and choices  Outcome: Progressing     Problem: Patient/Family Goals  Goal: Patient/Family Long Term Goal  Description: Patient's Long Term Goal: to be free of infection.    Interventions:  - iv and oral antibiotics given.  - See additional Care Plan goals for specific interventions  Outcome: Progressing  Goal: Patient/Family Short Term Goal  Description: Patient's Short Term Goal: to have a clear plan for discharge.    Interventions:   - case management initiated.  - See additional Care Plan goals for specific interventions  Outcome: Progressing     Problem: PAIN - ADULT  Goal: Verbalizes/displays adequate comfort level or patient's stated pain goal  Description: INTERVENTIONS:  - Encourage pt to monitor pain and request assistance  - Assess pain using appropriate pain scale  - Administer analgesics based on type and severity of pain and evaluate response  - Implement non-pharmacological measures as appropriate and evaluate response  - Consider cultural and social influences on pain and pain management  - Manage/alleviate anxiety  - Utilize distraction and/or relaxation techniques  - Monitor for opioid side effects  - Notify MD/LIP if interventions unsuccessful or patient reports new pain  - Anticipate increased pain with activity and pre-medicate as appropriate  Outcome:  Progressing     Problem: RISK FOR INFECTION - ADULT  Goal: Absence of fever/infection during anticipated neutropenic period  Description: INTERVENTIONS  - Monitor WBC  - Administer growth factors as ordered  - Implement neutropenic guidelines  Outcome: Progressing     Problem: SAFETY ADULT - FALL  Goal: Free from fall injury  Description: INTERVENTIONS:  - Assess pt frequently for physical needs  - Identify cognitive and physical deficits and behaviors that affect risk of falls.  - [place] fall precautions as indicated by assessment.  - Educate pt/family on patient safety including physical limitations  - Instruct pt to call for assistance with activity based on assessment  - Modify environment to reduce risk of injury  - Provide assistive devices as appropriate  - Consider OT/PT consult to assist with strengthening/mobility  - Encourage toileting schedule  Outcome: Progressing     Problem: DISCHARGE PLANNING  Goal: Discharge to home or other facility with appropriate resources  Description: INTERVENTIONS:  - Identify barriers to discharge w/pt and caregiver  - Include patient/family/discharge partner in discharge planning  - Arrange for needed discharge resources and transportation as appropriate  - Identify discharge learning needs (meds, wound care, etc)  - Arrange for interpreters to assist at discharge as needed  - Consider post-discharge preferences of patient/family/discharge partner  - Complete POLST form as appropriate  - Assess patient's ability to be responsible for managing their own health  - Refer to Case Management Department for coordinating discharge planning if the patient needs post-hospital services based on physician/LIP order or complex needs related to functional status, cognitive ability or social support system  Outcome: Progressing

## 2025-01-09 NOTE — OCCUPATIONAL THERAPY NOTE
Attempted to see pt for OT today. Pt is refusing because of fatigue and reported was already in the chair earlier. Despite rationale for therapy and importance, pt continued to refuse. Will re-attempt when pt is agreeable.    Pepper Cruz, OTR/L

## 2025-01-10 VITALS
BODY MASS INDEX: 25.86 KG/M2 | SYSTOLIC BLOOD PRESSURE: 126 MMHG | HEIGHT: 61 IN | RESPIRATION RATE: 18 BRPM | TEMPERATURE: 98 F | OXYGEN SATURATION: 95 % | HEART RATE: 95 BPM | WEIGHT: 137 LBS | DIASTOLIC BLOOD PRESSURE: 74 MMHG

## 2025-01-10 LAB
ANION GAP SERPL CALC-SCNC: 8 MMOL/L (ref 0–18)
BASOPHILS # BLD AUTO: 0.04 X10(3) UL (ref 0–0.2)
BASOPHILS NFR BLD AUTO: 0.6 %
BUN BLD-MCNC: 14 MG/DL (ref 9–23)
BUN/CREAT SERPL: 21.9 (ref 10–20)
CALCIUM BLD-MCNC: 9 MG/DL (ref 8.7–10.4)
CHLORIDE SERPL-SCNC: 100 MMOL/L (ref 98–112)
CO2 SERPL-SCNC: 28 MMOL/L (ref 21–32)
CREAT BLD-MCNC: 0.64 MG/DL
DEPRECATED RDW RBC AUTO: 43.9 FL (ref 35.1–46.3)
EGFRCR SERPLBLD CKD-EPI 2021: 83 ML/MIN/1.73M2 (ref 60–?)
EOSINOPHIL # BLD AUTO: 0.21 X10(3) UL (ref 0–0.7)
EOSINOPHIL NFR BLD AUTO: 2.9 %
ERYTHROCYTE [DISTWIDTH] IN BLOOD BY AUTOMATED COUNT: 13.4 % (ref 11–15)
GLUCOSE BLD-MCNC: 98 MG/DL (ref 70–99)
HCT VFR BLD AUTO: 37.9 %
HGB BLD-MCNC: 12.5 G/DL
IMM GRANULOCYTES # BLD AUTO: 0.06 X10(3) UL (ref 0–1)
IMM GRANULOCYTES NFR BLD: 0.8 %
LYMPHOCYTES # BLD AUTO: 1.21 X10(3) UL (ref 1–4)
LYMPHOCYTES NFR BLD AUTO: 16.9 %
MAGNESIUM SERPL-MCNC: 1.8 MG/DL (ref 1.6–2.6)
MCH RBC QN AUTO: 29.8 PG (ref 26–34)
MCHC RBC AUTO-ENTMCNC: 33 G/DL (ref 31–37)
MCV RBC AUTO: 90.2 FL
MONOCYTES # BLD AUTO: 0.75 X10(3) UL (ref 0.1–1)
MONOCYTES NFR BLD AUTO: 10.5 %
NEUTROPHILS # BLD AUTO: 4.87 X10 (3) UL (ref 1.5–7.7)
NEUTROPHILS # BLD AUTO: 4.87 X10(3) UL (ref 1.5–7.7)
NEUTROPHILS NFR BLD AUTO: 68.3 %
OSMOLALITY SERPL CALC.SUM OF ELEC: 282 MOSM/KG (ref 275–295)
PLATELET # BLD AUTO: 281 10(3)UL (ref 150–450)
POTASSIUM SERPL-SCNC: 3.9 MMOL/L (ref 3.5–5.1)
RBC # BLD AUTO: 4.2 X10(6)UL
SODIUM SERPL-SCNC: 136 MMOL/L (ref 136–145)
WBC # BLD AUTO: 7.1 X10(3) UL (ref 4–11)

## 2025-01-10 PROCEDURE — 97530 THERAPEUTIC ACTIVITIES: CPT

## 2025-01-10 PROCEDURE — 80048 BASIC METABOLIC PNL TOTAL CA: CPT | Performed by: INTERNAL MEDICINE

## 2025-01-10 PROCEDURE — 85025 COMPLETE CBC W/AUTO DIFF WBC: CPT | Performed by: INTERNAL MEDICINE

## 2025-01-10 PROCEDURE — 83735 ASSAY OF MAGNESIUM: CPT | Performed by: INTERNAL MEDICINE

## 2025-01-10 RX ORDER — MAGNESIUM SULFATE HEPTAHYDRATE 40 MG/ML
2 INJECTION, SOLUTION INTRAVENOUS ONCE
Status: DISCONTINUED | OUTPATIENT
Start: 2025-01-10 | End: 2025-01-10

## 2025-01-10 NOTE — PROGRESS NOTES
[facility] Hospitalist Progress Note     CC: Hospital Follow up    PCP: No primary care provider on file.       Assessment/Plan:     Principal Problem:    Community acquired pneumonia of left lower lobe of lung  Active Problems:    Hyponatremia    Leukocytosis    Azotemia    Hyperglycemia    Patient is a 89 year old female with PMH Hypertension, Hyperlipidemia, Pancreatic insufficiency, Myasthenia Gravis who presents with approximately 7 to 9 days of increasing URI symptoms, chills and fatigue.  Admitted with PNA and positive for parainfluenza and norovirus.  Course complicated with nausea and abdominal distention now resolved and off antibiotics.     Community-acquired pneumonia   Parainfluenza   - retrocardiac infiltrate noted on CXR, presumed superimposed bacterial  - Repeat CXR with worsening edema in right side, IV lasix x 1 completed   - Legionella negative  - mycoplasma negative   - Started dextromethorphan guaifenesin scheduled given increased sputum viscosity  - Trend wbc and fevers   - Augmentin 5 days complete   - Azithro 5 days complete   - off O2 now on RA  - IS, Up to chair   -Doing much better     Nausea   Abdominal distention resolved   - stop imodium   - Bms have returned   - KUB and CT a/p reviewed with no ileus or SBO seen   - possible gastroparesis  - scheduled reglan complete    - ambulate  - advancing diet   - IVF complete     -Maybe medication related, as she seems to be describing reflux, will stop probiotic for now   Was to go to [facility] pending ISO bed needs, is ISO bed required? If so when is ISO required until     Acute on Chronic diarrhea   Pancreatic insuffiencey   - likely exacerbated with GI panel + for norovirus  - stop imodium 2/2 abd distention   - resume pancrelipase  - probiotic added - maybe a cause of her belching ?, stopped     Myasthenia gravis without acute flare  - Continue home mycophenolate     Mild hyponatremia - resolved   Hypokalemia likely from emesis and poor  PO intake - trend and replace as needed  - PO intake is still low with low glucose   - maintenance fluids complete     Hyperlipidemia  - Continue home statin     Essential hypertension  - Continue home metoprolol     Chronic weakness PT/OT eval  - [facility] on dc      FN:  - IVF: None  - Diet: General diet   - PT/OT     DVT Prophy: Heparin TID refusing. Understands the risk of VTE  Lines:PIV    Questions/concerns were discussed with patient and/or family by bedside.    Dispo: [facility] pending bed availability     Thank You,  Hugo Lira DO     Hospitalist with [facility]     Subjective:     Feels well, still some bloating, no fevers or chills, wants to get to rehab,. No fevers or chills     OBJECTIVE:    Blood pressure 148/68, pulse 86, temperature 97.8 °F (36.6 °C), temperature source Temporal, resp. rate 18, height 5' 1\" (1.549 m), weight 137 lb (62.1 kg), SpO2 93%.    Temp:  [97.8 °F (36.6 °C)-98.5 °F (36.9 °C)] 97.8 °F (36.6 °C)  Pulse:  [84-95] 86  Resp:  [17-18] 18  BP: (125-148)/(68-82) 148/68  SpO2:  [91 %-94 %] 93 %      Intake/Output:    Intake/Output Summary (Last 24 hours) at 1/10/2025 1319  Last data filed at 1/10/2025 0945  Gross per 24 hour   Intake 240 ml   Output 100 ml   Net 140 ml       Last 3 Weights   01/03/25 2359 137 lb (62.1 kg)   01/01/25 1016 137 lb (62.1 kg)   11/01/24 0913 142 lb 6.4 oz (64.6 kg)   10/18/24 2156 140 lb 9.6 oz (63.8 kg)   10/18/24 1840 140 lb (63.5 kg)       Exam   Gen: No acute distress, alert and oriented x3  Pulm: good air movement, no wheezes   CV: Heart with regular rate and rhythm, no peripheral edema  Abd: Abdomen soft, distention improved, + BS   Ext: trace LE edema     Data Review:       Labs:     Recent Labs   Lab 01/08/25  0426 01/09/25  0513 01/10/25  0507   RBC 4.08 4.07 4.20   HGB 12.8 12.0 12.5   HCT 37.4 36.0 37.9   MCV 91.7 88.5 90.2   MCH 31.4 29.5 29.8   MCHC 34.2 33.3 33.0   RDW 13.4 13.4 13.4   NEPRELIM 3.92 8.97* 4.87   WBC 6.4 11.3* 7.1   .0  255.0 281.0         Recent Labs   Lab 01/08/25  0426 01/09/25  0512 01/10/25  0507   * 102* 98   BUN 7* 14 14   CREATSERUM 0.59 0.70 0.64   EGFRCR 85 81 83   CA 9.3 9.0 9.0    137 136   K 4.4 4.2 3.9    100 100   CO2 29.0 30.0 28.0       No results for input(s): \"ALT\", \"AST\", \"ALB\", \"AMYLASE\", \"LIPASE\", \"LDH\" in the last 168 hours.    Invalid input(s): \"ALPHOS\", \"TBIL\", \"DBIL\", \"TPROT\"      Imaging:  No results found.      Meds:      acidophilus  1 each Oral Daily    calcium carbonate  500 mg Oral TID    dextromethorphan-guaifenesin ER  1 tablet Oral BID    atorvastatin  40 mg Oral Nightly    aspirin  81 mg Oral Daily    gabapentin  300 mg Oral Nightly    metoprolol succinate ER  50 mg Oral QAM    mycophenolate mofetil  500 mg Oral BID    pantoprazole  40 mg Oral QAM    lipase-protease-amylase (Lip-Prot-Amyl)  40,000 Units Oral TID CC    heparin  5,000 Units Subcutaneous Q8H ANGELA             metoclopramide    ipratropium-albuterol    acetaminophen    polyethylene glycol (PEG 3350)    sennosides    bisacodyl    fleet enema    ondansetron

## 2025-01-10 NOTE — PLAN OF CARE
Progress adequate for discharge to rehab today per Sw and MDs. Spouse is aware.     Report given to rehab RN. Rehab notified medicar running late and will come soon.

## 2025-01-10 NOTE — PHYSICAL THERAPY NOTE
PHYSICAL THERAPY TREATMENT NOTE - INPATIENT     Room Number: 423/423-A       Presenting Problem: pneumonia  Co-Morbidities : htn,myasthenia gravis    Problem List  Principal Problem:    Community acquired pneumonia of left lower lobe of lung  Active Problems:    Hyponatremia    Leukocytosis    Azotemia    Hyperglycemia      PHYSICAL THERAPY ASSESSMENT   Patient demonstrates limited progress this session, goals  remain in progress.      Patient is requiring minimal assist as a result of the following impairments: decreased functional strength, decreased endurance/aerobic capacity, pain, and decreased muscular endurance.     Patient continues to function below baseline with bed mobility, transfers, gait, and standing prolonged periods.  Next session anticipate patient to progress bed mobility, transfers, gait, and standing prolonged periods.  Physical Therapy will continue to follow patient for duration of hospitalization.    Patient continues to benefit from continued skilled PT services: to promote return to prior level of function and safety with continuous assistance and gradual rehabilitative therapy .    PLAN DURING HOSPITALIZATION  Nursing Mobility Recommendation : 1 Assist     PT Treatment Plan: Bed mobility;Body mechanics;Coordination;Endurance;Energy conservation;Patient education;Gait training;Strengthening;Transfer training;Balance training  Frequency (Obs): 3-5x/week     SUBJECTIVE  Pt was agreeable to therapy session.         OBJECTIVE  Precautions: Bed/chair alarm    WEIGHT BEARING RESTRICTION       PAIN ASSESSMENT   Ratin  Location: buttock c/o incontinence with iritation in the rectal region  Management Techniques: Activity promotion;Body mechanics;Relaxation;Repositioning    BALANCE  Static Sitting: Fair +  Dynamic Sitting: Fair  Static Standing: Poor +  Dynamic Standing: Poor +    ACTIVITY TOLERANCE                          O2 WALK       AM-PAC '6-Clicks' INPATIENT SHORT FORM - BASIC  MOBILITY  How much difficulty does the patient currently have...  Patient Difficulty: Turning over in bed (including adjusting bedclothes, sheets and blankets)?: A Little   Patient Difficulty: Sitting down on and standing up from a chair with arms (e.g., wheelchair, bedside commode, etc.): A Little   Patient Difficulty: Moving from lying on back to sitting on the side of the bed?: A Little   How much help from another person does the patient currently need...   Help from Another: Moving to and from a bed to a chair (including a wheelchair)?: A Little   Help from Another: Need to walk in hospital room?: A Little   Help from Another: Climbing 3-5 steps with a railing?: A Lot     AM-PAC Score:  Raw Score: 17   Approx Degree of Impairment: 50.57%   Standardized Score (AM-PAC Scale): 42.13   CMS Modifier (G-Code): CK    FUNCTIONAL ABILITY STATUS  Functional Mobility/Gait Assessment  Gait Assistance: Minimum assistance  Distance (ft): Few steps to the chair  Assistive Device: Rolling walker  Pattern: Shuffle  Rolling: minimal assist  Supine to Sit: minimal assist  Sit to Supine:  NT  Sit to Stand: minimal assist    Skilled Therapy Provided: Pt is received in the bed and was cleared for therapy session. PCT was also present and assisted as needed. Pt is min A with bed mobility and to transfer to the EOB. Pt required some assist with her B LE's to transfer to the EOB. Pt Pt sat EOB for a few minutes and denied any dizziness and light headedness. Pt is min A with sit<>stand transfers with the RW from the height of the bed. Pt was able to take a few slow shuffling steps to the chair with the RW min A for balance and safety. Pt fatigues quickly with activity. Pt then was repositioned and left in the chair with all needs within reach and alarm system activated. Reported to the RN on the status of the pt.     The patient's Approx Degree of Impairment: 50.57% has been calculated based on documentation in the [facility] '6 clicks'  Inpatient Daily Activity Short Form.  Research supports that patients with this level of impairment may benefit from Rehab.  Final disposition will be made by interdisciplinary medical team.        Patient End of Session: Up in chair;Needs met;Call light within reach;RN aware of session/findings;All patient questions and concerns addressed;Hospital anti-slip socks;Alarm set    CURRENT GOALS   Goals to be met by: 1/16/25  Patient Goal Patient's self-stated goal is: to go home   Goal #1 Patient is able to demonstrate supine - sit EOB @ level: independent      Goal #1   Current Status Min A   Goal #2 Patient is able to demonstrate transfers Sit to/from Stand at assistance level: supervision with none      Goal #2  Current Status Min A with RW   Goal #3 Patient is able to ambulate 50 feet with assist device: walker - rolling at assistance level: supervision   Goal #3   Current Status Few steps to the chair with the RW min A    Goal #4 Patient to demonstrate independence with home activity/exercise instructions provided to patient in preparation for discharge.   Goal #4   Current Status instructed       Therapeutic Activity: 25 minutes

## 2025-01-11 NOTE — DISCHARGE SUMMARY
General Medicine Discharge Summary     Patient ID:  Sharda Rai  89 year old  12/11/1932    Admit date: 1/1/2025    Discharge date and time: 1/10/2025  6:59 PM     Attending Physician: Hugo Lira DO     Consults: NURSING CONSULT TO DIETITIAN  IP CONSULT TO SPIRITUAL CARE    Primary Care Physician: No primary care provider on file.     Reason for admission: SOB, diarrhea , confusion     Risk For Readmission: Low     Discharge Diagnoses: Community acquired pneumonia of left lower lobe of lung [J18.9]  See Additional Discharge Diagnoses in Hospital Course    Discharged Condition: good    Follow-up with labs/images appointments:   Close follow-up with PCP suggested     Exam  Gen: No acute distress  Pulm: Lungs clear, normal respiratory effort  CV: Heart with regular rate and rhythm  Abd: Abdomen soft,   EXT: no edema     HPI:    Patient is a 89 year old female with PMH hypertension, hyperlipidemia, pancreatic insufficiency, myasthenia gravis who presents with approximately 7 to 9 days of increasing cough and congestion difficult time expelling sputum things took a turn for the worse on Monday where she developed chills and increasing fatigue felt very tired.  Ultimately family brought her to the hospital for further evaluation she was found to have retrocardiac airspace density consistent with pneumonia on chest x-ray she was started on Zosyn in the emergency department and admitted to the hospital for further management.   Upon my evaluation patient denies any history of aspiration events she states she does not feel shortness of breath currently but still feels somewhat congested has a hard time managing her sputum as it has been thicker than typical she has been compliant with her medications at home including her mycophenolate for myasthenia.   According to daughter at bedside patient's  was also recently sick they live in an assisted living facility.  His symptoms included diarrhea however.                Hospital Course: Please see chart for full details of admission     Patient was admitted for 8 to 9 days of cough congestion and shortness of breath ultimately found to have parainfluenza she was also having diarrhea and was found to have norovirus, as well as a retrocardiac infiltrate treated for bacterial pneumonia.  Throughout the hospital stay she improved with antibiotics and supportive care she was eventually weaned to room air her diarrhea improved however she does have chronic diarrhea from a known pancreatic insufficiency she also has known myasthenia gravis Hospital course was complicated by mild hyponatremia which also resolved at the time of discharge she eventually was discharged to subacute rehab.  Close follow-up with primary care provider was recommended    Operative Procedures:      Imaging: No results found.    Disposition: home    Activity: activity as tolerated  Diet: regular diet  Wound Care: none needed  Code Status: Full Code  O2: None    Home Medication Changes: See list below     Med list     Medication List        START taking these medications      dextromethorphan-guaifenesin ER  MG Tb12  Commonly known as: Mucinex DM            CONTINUE taking these medications      acetaminophen 325 MG Tabs  Commonly known as: Tylenol     aspirin 81 MG Tbec     atorvastatin 40 MG Tabs  Commonly known as: Lipitor     cholecalciferol 25 MCG (1000 UT) Tabs  Commonly known as: Vitamin D3     Creon [id number] units Cpep  Generic drug: Pancrelipase (Lip-Prot-Amyl)     gabapentin 300 MG Caps  Commonly known as: Neurontin  Take 1 capsule (300 mg total) by mouth nightly.     hydroCHLOROthiazide 25 MG Tabs     Imodium A-D 2 MG Caps  Generic drug: loperamide     Meloxicam 15 MG Tabs     metoprolol succinate ER 50 MG Tb24  Commonly known as: Toprol XL     multivitamin Tabs     Mycophenolate Mofetil 500 MG Tabs  Commonly known as: CELLCEPT  Take 1 tablet (500 mg total) by mouth 2 (two) times daily.      pantoprazole 40 MG Tbec  Commonly known as: Protonix     VITAMIN B COMPLEX OR              FU   Follow-up Information       Lars Mancera. Schedule an appointment as soon as possible for a visit in 1 week(s).    Specialty: Internal Medicine  Contact information:  [address]  [facility] [address]  [facility] [address] [phone]                             DC instructions:      Other Discharge Instructions:         Follow up with Primary MD.           I reconciled current and discharge medications on day of discharge, discussed changes with patient and noted changes above.       Total Time Coordinating Care: 35 minutes    Patient had opportunity to ask questions and state understand and agree with therapeutic plan as outlined    Thank You,    Hugo Lira, DO   Hospitalist with [facility]

## 2025-01-26 ENCOUNTER — PATIENT MESSAGE (OUTPATIENT)
Dept: NEUROLOGY | Facility: CLINIC | Age: OVER 89
End: 2025-01-26

## 2025-01-27 NOTE — TELEPHONE ENCOUNTER
Pt called in is following up on [employer] message he sent 1/26. Informed pt that [facility] has not contacted office, nothing documented on chart.  Pt is currently [facility] located over at [place]. Pt  doesn't know name of doctor where pt is being treated. But would like some clarify regarding urinary incontinence for the past 7 day. Pt  wondering if it could be related to her myasthenia gravis diagnosis. Pls advise.

## 2025-01-27 NOTE — TELEPHONE ENCOUNTER
Phone call returned to pt , Daniel. Daniel is very concerned in regards to the pt urinary status. The patient over the last 7 days has become incontinent of urine and would like to know if the doctor has any resolution for this. Pt is already being assessed for a UTI.

## 2025-01-27 NOTE — TELEPHONE ENCOUNTER
Urinary incontinence would not be a typical side effect of the myasthenia, but it be a side effect of the mycophenolate.  The doctors there probably need to keep working it up for other causes such as infection, etc..

## 2025-01-29 ENCOUNTER — HOSPITAL ENCOUNTER (INPATIENT)
Facility: HOSPITAL | Age: OVER 89
LOS: 6 days | Discharge: NURSING, REHAB OR LONG-TERM CARE | End: 2025-02-04
Attending: EMERGENCY MEDICINE | Admitting: INTERNAL MEDICINE
Payer: MEDICARE

## 2025-01-29 ENCOUNTER — APPOINTMENT (OUTPATIENT)
Dept: CT IMAGING | Facility: HOSPITAL | Age: OVER 89
End: 2025-01-29
Attending: EMERGENCY MEDICINE
Payer: MEDICARE

## 2025-01-29 DIAGNOSIS — R31.9 HEMATURIA, UNSPECIFIED TYPE: Primary | ICD-10-CM

## 2025-01-29 DIAGNOSIS — R33.9 URINARY RETENTION: ICD-10-CM

## 2025-01-29 DIAGNOSIS — N30.01 ACUTE CYSTITIS WITH HEMATURIA: ICD-10-CM

## 2025-01-29 PROBLEM — N17.9 ACUTE RENAL FAILURE (ARF): Status: ACTIVE | Noted: 2025-01-29

## 2025-01-29 PROBLEM — D64.9 ANEMIA: Status: ACTIVE | Noted: 2025-01-29

## 2025-01-29 PROBLEM — N17.9 ACUTE KIDNEY INJURY: Status: ACTIVE | Noted: 2025-01-29

## 2025-01-29 LAB
ALBUMIN SERPL-MCNC: 3.5 G/DL (ref 3.2–4.8)
ALBUMIN/GLOB SERPL: 1.4 {RATIO} (ref 1–2)
ALP LIVER SERPL-CCNC: 97 U/L
ALT SERPL-CCNC: 17 U/L
ANION GAP SERPL CALC-SCNC: 11 MMOL/L (ref 0–18)
ANION GAP SERPL CALC-SCNC: 8 MMOL/L (ref 0–18)
ANTIBODY SCREEN: NEGATIVE
AST SERPL-CCNC: 22 U/L (ref ?–34)
ATRIAL RATE: 80 BPM
BASOPHILS # BLD AUTO: 0.03 X10(3) UL (ref 0–0.2)
BASOPHILS NFR BLD AUTO: 0.2 %
BILIRUB SERPL-MCNC: 0.7 MG/DL (ref 0.2–0.9)
BUN BLD-MCNC: 41 MG/DL (ref 9–23)
BUN BLD-MCNC: 47 MG/DL (ref 9–23)
BUN/CREAT SERPL: 28.3 (ref 10–20)
BUN/CREAT SERPL: 32.3 (ref 10–20)
CALCIUM BLD-MCNC: 8.9 MG/DL (ref 8.7–10.4)
CALCIUM BLD-MCNC: 9 MG/DL (ref 8.7–10.4)
CHLORIDE SERPL-SCNC: 92 MMOL/L (ref 98–112)
CHLORIDE SERPL-SCNC: 98 MMOL/L (ref 98–112)
CO2 SERPL-SCNC: 25 MMOL/L (ref 21–32)
CO2 SERPL-SCNC: 26 MMOL/L (ref 21–32)
CREAT BLD-MCNC: 1.27 MG/DL
CREAT BLD-MCNC: 1.66 MG/DL
CREAT UR-SCNC: 30.3 MG/DL
DEPRECATED RDW RBC AUTO: 43.7 FL (ref 35.1–46.3)
EGFRCR SERPLBLD CKD-EPI 2021: 29 ML/MIN/1.73M2 (ref 60–?)
EGFRCR SERPLBLD CKD-EPI 2021: 40 ML/MIN/1.73M2 (ref 60–?)
EOSINOPHIL # BLD AUTO: 0.05 X10(3) UL (ref 0–0.7)
EOSINOPHIL NFR BLD AUTO: 0.4 %
ERYTHROCYTE [DISTWIDTH] IN BLOOD BY AUTOMATED COUNT: 13.6 % (ref 11–15)
GLOBULIN PLAS-MCNC: 2.5 G/DL (ref 2–3.5)
GLUCOSE BLD-MCNC: 131 MG/DL (ref 70–99)
GLUCOSE BLD-MCNC: 145 MG/DL (ref 70–99)
HCT VFR BLD AUTO: 32.2 %
HGB BLD-MCNC: 11.2 G/DL
IMM GRANULOCYTES # BLD AUTO: 0.12 X10(3) UL (ref 0–1)
IMM GRANULOCYTES NFR BLD: 0.9 %
LACTATE SERPL-SCNC: 1.8 MMOL/L (ref 0.5–2)
LYMPHOCYTES # BLD AUTO: 0.81 X10(3) UL (ref 1–4)
LYMPHOCYTES NFR BLD AUTO: 5.9 %
MCH RBC QN AUTO: 30.4 PG (ref 26–34)
MCHC RBC AUTO-ENTMCNC: 34.8 G/DL (ref 31–37)
MCV RBC AUTO: 87.5 FL
MONOCYTES # BLD AUTO: 0.77 X10(3) UL (ref 0.1–1)
MONOCYTES NFR BLD AUTO: 5.6 %
NEUTROPHILS # BLD AUTO: 12.03 X10 (3) UL (ref 1.5–7.7)
NEUTROPHILS # BLD AUTO: 12.03 X10(3) UL (ref 1.5–7.7)
NEUTROPHILS NFR BLD AUTO: 87 %
OSMOLALITY SERPL CALC.SUM OF ELEC: 283 MOSM/KG (ref 275–295)
OSMOLALITY SERPL CALC.SUM OF ELEC: 284 MOSM/KG (ref 275–295)
P AXIS: 56 DEGREES
P-R INTERVAL: 128 MS
PLATELET # BLD AUTO: 265 10(3)UL (ref 150–450)
POTASSIUM SERPL-SCNC: 3.6 MMOL/L (ref 3.5–5.1)
POTASSIUM SERPL-SCNC: 3.6 MMOL/L (ref 3.5–5.1)
PROT SERPL-MCNC: 6 G/DL (ref 5.7–8.2)
Q-T INTERVAL: 376 MS
QRS DURATION: 86 MS
QTC CALCULATION (BEZET): 433 MS
R AXIS: 61 DEGREES
RBC # BLD AUTO: 3.68 X10(6)UL
RBC #/AREA URNS AUTO: >10 /HPF
RBC #/AREA URNS AUTO: >10 /HPF
RH BLOOD TYPE: POSITIVE
RH BLOOD TYPE: POSITIVE
SODIUM SERPL-SCNC: 129 MMOL/L (ref 136–145)
SODIUM SERPL-SCNC: 131 MMOL/L (ref 136–145)
SODIUM SERPL-SCNC: 54 MMOL/L
SP GR UR STRIP: 1.02 (ref 1–1.03)
T AXIS: 63 DEGREES
VENTRICULAR RATE: 80 BPM
WBC # BLD AUTO: 13.8 X10(3) UL (ref 4–11)

## 2025-01-29 PROCEDURE — 86900 BLOOD TYPING SEROLOGIC ABO: CPT | Performed by: EMERGENCY MEDICINE

## 2025-01-29 PROCEDURE — 93005 ELECTROCARDIOGRAM TRACING: CPT

## 2025-01-29 PROCEDURE — 85025 COMPLETE CBC W/AUTO DIFF WBC: CPT | Performed by: EMERGENCY MEDICINE

## 2025-01-29 PROCEDURE — 87077 CULTURE AEROBIC IDENTIFY: CPT | Performed by: EMERGENCY MEDICINE

## 2025-01-29 PROCEDURE — 82570 ASSAY OF URINE CREATININE: CPT | Performed by: INTERNAL MEDICINE

## 2025-01-29 PROCEDURE — 87186 SC STD MICRODIL/AGAR DIL: CPT | Performed by: EMERGENCY MEDICINE

## 2025-01-29 PROCEDURE — 51700 IRRIGATION OF BLADDER: CPT

## 2025-01-29 PROCEDURE — 86901 BLOOD TYPING SEROLOGIC RH(D): CPT | Performed by: EMERGENCY MEDICINE

## 2025-01-29 PROCEDURE — 87040 BLOOD CULTURE FOR BACTERIA: CPT | Performed by: EMERGENCY MEDICINE

## 2025-01-29 PROCEDURE — 83605 ASSAY OF LACTIC ACID: CPT | Performed by: EMERGENCY MEDICINE

## 2025-01-29 PROCEDURE — 87086 URINE CULTURE/COLONY COUNT: CPT | Performed by: EMERGENCY MEDICINE

## 2025-01-29 PROCEDURE — 36415 COLL VENOUS BLD VENIPUNCTURE: CPT

## 2025-01-29 PROCEDURE — 86850 RBC ANTIBODY SCREEN: CPT | Performed by: EMERGENCY MEDICINE

## 2025-01-29 PROCEDURE — 84300 ASSAY OF URINE SODIUM: CPT | Performed by: INTERNAL MEDICINE

## 2025-01-29 PROCEDURE — 74176 CT ABD & PELVIS W/O CONTRAST: CPT | Performed by: EMERGENCY MEDICINE

## 2025-01-29 PROCEDURE — 81015 MICROSCOPIC EXAM OF URINE: CPT | Performed by: EMERGENCY MEDICINE

## 2025-01-29 PROCEDURE — 96365 THER/PROPH/DIAG IV INF INIT: CPT

## 2025-01-29 PROCEDURE — 93010 ELECTROCARDIOGRAM REPORT: CPT

## 2025-01-29 PROCEDURE — 80053 COMPREHEN METABOLIC PANEL: CPT | Performed by: EMERGENCY MEDICINE

## 2025-01-29 PROCEDURE — 83935 ASSAY OF URINE OSMOLALITY: CPT | Performed by: INTERNAL MEDICINE

## 2025-01-29 PROCEDURE — 99285 EMERGENCY DEPT VISIT HI MDM: CPT

## 2025-01-29 PROCEDURE — 81001 URINALYSIS AUTO W/SCOPE: CPT | Performed by: EMERGENCY MEDICINE

## 2025-01-29 RX ORDER — POLYETHYLENE GLYCOL 3350 17 G/17G
17 POWDER, FOR SOLUTION ORAL AS NEEDED
COMMUNITY

## 2025-01-29 RX ORDER — ATORVASTATIN CALCIUM 40 MG/1
40 TABLET, FILM COATED ORAL NIGHTLY
Status: DISCONTINUED | OUTPATIENT
Start: 2025-01-29 | End: 2025-02-04

## 2025-01-29 RX ORDER — PANTOPRAZOLE SODIUM 40 MG/1
40 TABLET, DELAYED RELEASE ORAL EVERY MORNING
Status: DISCONTINUED | OUTPATIENT
Start: 2025-01-30 | End: 2025-02-04

## 2025-01-29 RX ORDER — HYDROCORTISONE 10 MG/G
1 CREAM TOPICAL 2 TIMES DAILY
COMMUNITY

## 2025-01-29 RX ORDER — PANCRELIPASE 36000; 180000; 114000 [USP'U]/1; [USP'U]/1; [USP'U]/1
1 CAPSULE, DELAYED RELEASE PELLETS ORAL
COMMUNITY

## 2025-01-29 RX ORDER — ACETAMINOPHEN 500 MG
1000 TABLET ORAL ONCE
Status: COMPLETED | OUTPATIENT
Start: 2025-01-29 | End: 2025-01-29

## 2025-01-29 RX ORDER — MAGNESIUM HYDROXIDE 1200 MG/15ML
3000 LIQUID ORAL CONTINUOUS
Status: DISCONTINUED | OUTPATIENT
Start: 2025-01-29 | End: 2025-02-01

## 2025-01-29 RX ORDER — ONDANSETRON 2 MG/ML
4 INJECTION INTRAMUSCULAR; INTRAVENOUS EVERY 6 HOURS PRN
Status: DISCONTINUED | OUTPATIENT
Start: 2025-01-29 | End: 2025-02-04

## 2025-01-29 RX ORDER — PANCRELIPASE 36000; 180000; 114000 [USP'U]/1; [USP'U]/1; [USP'U]/1
2 CAPSULE, DELAYED RELEASE PELLETS ORAL 2 TIMES DAILY WITH MEALS
COMMUNITY

## 2025-01-29 RX ORDER — ACETAMINOPHEN 325 MG/1
650 TABLET ORAL EVERY 6 HOURS PRN
Status: DISCONTINUED | OUTPATIENT
Start: 2025-01-29 | End: 2025-02-04

## 2025-01-29 RX ORDER — GABAPENTIN 300 MG/1
300 CAPSULE ORAL NIGHTLY
Status: DISCONTINUED | OUTPATIENT
Start: 2025-01-29 | End: 2025-02-04

## 2025-01-29 RX ORDER — SODIUM CHLORIDE 9 MG/ML
INJECTION, SOLUTION INTRAVENOUS CONTINUOUS
Status: DISCONTINUED | OUTPATIENT
Start: 2025-01-29 | End: 2025-01-30

## 2025-01-29 NOTE — ED QUICK NOTES
Triple lumen sky inserted for CBI. Bloody, cloudy urine return. Inially drained ~1400cc urine prior to starting CBI

## 2025-01-29 NOTE — ED INITIAL ASSESSMENT (HPI)
Pt to the ed via ems for complaints of vaginal bleeding and hypotension  [facility] staff reports blood in the urine that was first noted yesterday, which then increased today  Bp 80s systolic on arrival  Pt a/ox4

## 2025-01-29 NOTE — ED QUICK NOTES
Report received from RN. Rounding Completed    Plan of Care reviewed. Waiting for room assignment.  Elimination needs assessed.  Provided update.    Bed is locked and in lowest position. Call light within reach.

## 2025-01-29 NOTE — ED PROVIDER NOTES
Patient Seen in: [facility] Emergency Department      History     Chief Complaint   Patient presents with    Bleeding    Hypotension     Stated Complaint: bleeding    Subjective:   HPI      Patient presents the emergency department from a skilled nursing facility where she was found to have blood in her urine and hypotension.  Patient states that she has some mild aches and pains but no specific areas of pain.  There is no vomiting or diarrhea.  There is no other aggravating relieving factors.  There is no fever.    Objective:     Past Medical History:    Blepharitis    Dermatochalasis    Dry eye syndrome of both eyes    Essential hypertension    Gallstones    Hyperlipidemia    Macular pucker, bilateral    Myasthenia gravis (HCC)    Pancreatic insufficiency (HCC)    Pseudophakia, both eyes              Past Surgical History:   Procedure Laterality Date    Cataract extraction w/  intraocular lens implant Left 02/20/2013    Dr. Roseann leo     Cataract extraction w/  intraocular lens implant Right     Laparoscopic cholecystectomy  10/19/2024    XI ROBOT-ASSISTED LAPAROSCOPIC CHOLECYSTECTOMY    Yag capsulotomy - od - right eye Right 04/10/2007                Social History     Socioeconomic History    Marital status:    Tobacco Use    Smoking status: Never    Smokeless tobacco: Never   Vaping Use    Vaping status: Never Used   Substance and Sexual Activity    Alcohol use: Never    Drug use: Never     Social Drivers of Health     Food Insecurity: No Food Insecurity (1/1/2025)    Food Insecurity     Food Insecurity: Never true   Transportation Needs: No Transportation Needs (1/1/2025)    Transportation Needs     Lack of Transportation: No    [facility]    Social Connections   Housing Stability: Low Risk  (1/1/2025)    Housing Stability     Housing Instability: No                  Physical Exam     ED Triage Vitals [01/29/25 1156]   BP  (!) 81/49   Pulse 79   Resp 18   Temp 97.4 °F (36.3 °C)   Temp src Temporal   SpO2 95 %   O2 Device None (Room air)       Current Vitals:   Vital Signs  BP: 105/47  Pulse: 76  Resp: 15  Temp: 97.4 °F (36.3 °C)  Temp src: Temporal  MAP (mmHg): 65    Oxygen Therapy  SpO2: 94 %  O2 Device: None (Room air)        Physical Exam  Vitals and nursing note reviewed.   Constitutional:       General: She is not in acute distress.     Appearance: She is well-developed.   HENT:      Head: Normocephalic.      Nose: Nose normal.      Mouth/Throat:      Mouth: Mucous membranes are moist.   Eyes:      Conjunctiva/sclera: Conjunctivae normal.   Cardiovascular:      Rate and Rhythm: Normal rate and regular rhythm.      Heart sounds: No murmur heard.  Pulmonary:      Effort: Pulmonary effort is normal. No respiratory distress.      Breath sounds: Normal breath sounds.   Abdominal:      General: There is no distension.      Palpations: Abdomen is soft.      Tenderness: There is no abdominal tenderness.   Musculoskeletal:         General: No tenderness. Normal range of motion.      Cervical back: Normal range of motion and neck supple.   Skin:     General: Skin is warm and dry.      Capillary Refill: Capillary refill takes less than 2 seconds.      Findings: No rash.   Neurological:      General: No focal deficit present.      Mental Status: She is alert and oriented to person, place, and time.      Cranial Nerves: No cranial nerve deficit.      Sensory: No sensory deficit.      Motor: No weakness.      Coordination: Coordination normal.             ED Course     Labs Reviewed   URINALYSIS WITH CULTURE REFLEX - Abnormal; Notable for the following components:       Result Value    Urine Color Red (*)     Clarity Urine Cloudy (*)     WBC Urine 21-50 (*)     RBC Urine >10 (*)     All other components within normal limits   CBC WITH DIFFERENTIAL WITH PLATELET - Abnormal; Notable for the following components:    WBC 13.8 (*)     RBC 3.68 (*)      HGB 11.2 (*)     HCT 32.2 (*)     Neutrophil Absolute Prelim 12.03 (*)     Neutrophil Absolute 12.03 (*)     Lymphocyte Absolute 0.81 (*)     All other components within normal limits   COMP METABOLIC PANEL (14) - Abnormal; Notable for the following components:    Glucose 145 (*)     Sodium 129 (*)     Chloride 92 (*)     BUN 47 (*)     Creatinine 1.66 (*)     BUN/CREA Ratio 28.3 (*)     eGFR-Cr 29 (*)     All other components within normal limits   UA MICROSCOPIC ONLY, URINE - Abnormal; Notable for the following components:    WBC Urine 21-50 (*)     RBC Urine >10 (*)     All other components within normal limits   LACTIC ACID, PLASMA - Normal   TYPE AND SCREEN    Narrative:     The following orders were created for panel order Type and screen.  Procedure                               Abnormality         Status                     ---------                               -----------         ------                     ABORH (Blood Type)[[id number]]                               Final result               Antibody Screen[[id number]]                                  Final result                 Please view results for these tests on the individual orders.   ABORH (BLOOD TYPE)   ANTIBODY SCREEN   ABORH CONFIRMATION   RAINBOW DRAW LAVENDER   RAINBOW DRAW LIGHT GREEN   RAINBOW DRAW BLUE   URINE CULTURE, ROUTINE   BLOOD CULTURE   BLOOD CULTURE   URINE CULTURE, ROUTINE     EKG    Rate, intervals and axes as noted on EKG Report.  Rate: 80 bpm  Rhythm: Sinus Rhythm  Reading: Normal EKG                       [facility]          Admission disposition: 1/29/2025  1:53 PM           Medical Decision Making  Differential diagnosis considered for urinary retention, cystitis, hematuria.    Problems Addressed:  Acute cystitis with hematuria: acute illness or injury  Hematuria, unspecified type: acute illness or injury  Urinary retention: acute illness or injury    Amount and/or Complexity of Data Reviewed  Labs: ordered. Decision-making  details documented in ED Course.     Details: Urine appears infected with evidence of gross hematuria as well.  Hemoglobin normal, elevated white blood cell count.  Kidney function mildly elevated.  Radiology: ordered and independent interpretation performed. Decision-making details documented in ED Course.     Details: CT scan shows urinary retention.  ECG/medicine tests: ordered and independent interpretation performed. Decision-making details documented in ED Course.  Discussion of management or test interpretation with external provider(s): Patient was given IV fluids, three-way Guevara catheter placed for continuous bladder irrigation.  Hospitalist and urology notified of admission.  Antibiotics initiated in the emergency department.    Risk  Prescription drug management.  Decision regarding hospitalization.        Disposition and Plan     Clinical Impression:  1. Hematuria, unspecified type    2. Acute cystitis with hematuria    3. Urinary retention         Disposition:  Admit  1/29/2025  1:53 pm    Follow-up:  No follow-up provider specified.  We recommend that you schedule follow up care with a primary care provider within the next three months to obtain basic health screening including reassessment of your blood pressure.      Medications Prescribed:  Current Discharge Medication List              Supplementary Documentation:         Hospital Problems       Present on Admission  Date Reviewed: 11/1/2024            ICD-10-CM Noted POA    * (Principal) Hematuria, unspecified type R31.9 1/29/2025 Unknown    Acute kidney injury (HCC) N17.9 1/29/2025 Yes    Acute renal failure (ARF) (HCC) N17.9 1/29/2025 Yes    Anemia D64.9 1/29/2025 Yes

## 2025-01-29 NOTE — H&P
[facility] Hospitalist H&P       CC: dark urine     PCP: None Pcp    History of Present Illness:   89 year old female with PMH Hypertension, Hyperlipidemia, Pancreatic insufficiency, Myasthenia Gravis, who presents from her nursing facility due to concern of possible hematuria/dark urine/and possible UTI. Of note patient was recently discharged from the hospital on 1/11/2025, during that admission she was treated for pneumonia, positive for parainfluenza, as well as norovirus.  During that admission the patient did not have any urinary symptoms or issues with retention.     Review of Systems  Comprehensive ROS reviewed and negative except for what's stated above.     PMH  Hypertension, Hyperlipidemia, Pancreatic insufficiency, Myasthenia Gravis    PSH  Past Surgical History:   Procedure Laterality Date    Cataract extraction w/  intraocular lens implant Left 02/20/2013    Dr. Roseann frost trad     Cataract extraction w/  intraocular lens implant Right     Laparoscopic cholecystectomy  10/19/2024    XI ROBOT-ASSISTED LAPAROSCOPIC CHOLECYSTECTOMY    Yag capsulotomy - od - right eye Right 04/10/2007        ALL:  Allergies[1]     Home Medications:  CellCept 500 mg twice daily  Creon 1-2 tabs 3 times daily  Protonix 40 mg daily  Toprol 50 mg daily  Hydrochlorothiazide 25 mg daily  Gabapentin 300 mg nightly  Meloxicam 15 mg daily  Aspirin 81 mg daily  Atorvastatin 40 mg nightly    Soc Hx  Social History     Tobacco Use    Smoking status: Never    Smokeless tobacco: Never   Substance Use Topics    Alcohol use: Never      Fam Hx  Family History   Problem Relation Age of Onset    Retinal detachment Father     Glaucoma Mother      OBJECTIVE:  /47   Pulse 76   Temp 97.4 °F (36.3 °C) (Temporal)   Resp 15   Wt 136 lb 11 oz (62 kg)   LMP  (LMP Unknown)   SpO2 94%   BMI 25.83 kg/m²   General: Alert, no acute distress  Lungs: clear to ausculation bilaterally  Heart: Regular rate and rhythm  Abdomen: soft, non  tender  Extremities: No edema  Skin: no new rash, normal color    Diagnostic Data:    CBC/Chem  Recent Labs   Lab 01/29/25  1200   WBC 13.8*   HGB 11.2*   MCV 87.5   .0       Recent Labs   Lab 01/29/25  1200   *   K 3.6   CL 92*   CO2 26.0   BUN 47*   CREATSERUM 1.66*   *   CA 8.9       Recent Labs   Lab 01/29/25  1200   ALT 17   AST 22   ALB 3.5     Radiology:     CT ABDOMEN+PELVIS KIDNEYSTONE 2D RNDR(NO IV,NO ORAL)(CPT=74176)  Result Date: 1/29/2025  CONCLUSION:  1.  Prominent distention urinary bladder containing 1300 mL of urine.  Correlate for voluntary versus involuntary urinary retention.  Moderate bilateral hydroureteronephrosis extending from the renal calices to the urinary bladder which may represent changes of vascular ureteral reflux/central obstruction. 2.  Colonic diverticulosis. 3.  Coronary atherosclerosis. 4.  Post cholecystectomy.  No significant biliary ductal dilatation. 5.  Scattered large duodenal diverticula.    Dictated by (CST): Tone Saldana MD on 1/29/2025 at 1:32 PM     Finalized by (CST): Tone Saldana MD on 1/29/2025 at 1:37 PM           ASSESSMENT / PLAN:   89 year old female with PMH Hypertension, Hyperlipidemia, Pancreatic insufficiency, Myasthenia Gravis, who presents from her nursing facility due to concern of possible hematuria/dark urine/and possible UTI.     Hematuria  Urinary retention   Possible UTI  LISANDRO  -sky placed with CBI  -urology consult  -IV antibiotic with rocephin IV given in ER  -follow urine and blood cultures     LISANDRO  hyponatremia  -start IV fluids   -hold cellcept today incase contributing to hematuria and LISANDRO  -sky placed  -will ask nephrology to follow, although LISANDRO could be from retention    Myasthenia gravis  -hold cellcept at least today, hopefully will resume asap     Pancreatic insufficiency  -resume her creon     HTN  -hold hydrochlorothiazide   -hold metoprolol until vitals reviewed in AM  -avoid hypotension given her LISANDRO    DVT  prophylaxis: hold chemical prophy given possible hematuria   Code status: full code    Tejinder Feliciano [facility] Hospitalist            [1]   Allergies  Allergen Reactions    Shellfish DIARRHEA

## 2025-01-29 NOTE — ED QUICK NOTES
Orders for admission, patient is aware of plan and ready to go upstairs. Any questions, please call ED RN benigno at extension [phone].     Patient Covid vaccination status: Unvaccinated     COVID Test Ordered in ED: None    COVID Suspicion at Admission: N/A    Running Infusions:    sodium chloride      None    Mental Status/LOC at time of transport: aox4    Other pertinent information:   CIWA score: N/A   NIH score:  N/A     CBI started

## 2025-01-29 NOTE — PLAN OF CARE
Pt is alert and oriented x4. On room air. Tele ordered. Vital signs stable. Rolling side to side. Guevara 3 way in place. Moderate rate. Cherry color urine in bag. On general diet. Tolerating well. Denies pain and nausea. Aquacel placed on the sacrum. Small tear noted. Safety measures in place. Will continue to monitor     Problem: Patient Centered Care  Goal: Patient preferences are identified and integrated in the patient's plan of care  Description: Interventions:  - Provide timely, complete, and accurate information to patient/family  - Incorporate patient and family knowledge, values, beliefs, and cultural backgrounds into the planning and delivery of care  - Encourage patient/family to participate in care and decision-making at the level they choose  - Honor patient and family perspectives and choices  Outcome: Progressing

## 2025-01-30 LAB
ANION GAP SERPL CALC-SCNC: 7 MMOL/L (ref 0–18)
BASOPHILS # BLD AUTO: 0.04 X10(3) UL (ref 0–0.2)
BASOPHILS NFR BLD AUTO: 0.5 %
BUN BLD-MCNC: 31 MG/DL (ref 9–23)
BUN/CREAT SERPL: 33.7 (ref 10–20)
CALCIUM BLD-MCNC: 8.9 MG/DL (ref 8.7–10.4)
CHLORIDE SERPL-SCNC: 103 MMOL/L (ref 98–112)
CO2 SERPL-SCNC: 25 MMOL/L (ref 21–32)
CREAT BLD-MCNC: 0.92 MG/DL
DEPRECATED RDW RBC AUTO: 44.3 FL (ref 35.1–46.3)
EGFRCR SERPLBLD CKD-EPI 2021: 58 ML/MIN/1.73M2 (ref 60–?)
EOSINOPHIL # BLD AUTO: 0.41 X10(3) UL (ref 0–0.7)
EOSINOPHIL NFR BLD AUTO: 5 %
ERYTHROCYTE [DISTWIDTH] IN BLOOD BY AUTOMATED COUNT: 13.6 % (ref 11–15)
GLUCOSE BLD-MCNC: 90 MG/DL (ref 70–99)
HCT VFR BLD AUTO: 33.4 %
HGB BLD-MCNC: 11 G/DL
IMM GRANULOCYTES # BLD AUTO: 0.09 X10(3) UL (ref 0–1)
IMM GRANULOCYTES NFR BLD: 1.1 %
LYMPHOCYTES # BLD AUTO: 1.04 X10(3) UL (ref 1–4)
LYMPHOCYTES NFR BLD AUTO: 12.7 %
MCH RBC QN AUTO: 29.4 PG (ref 26–34)
MCHC RBC AUTO-ENTMCNC: 32.9 G/DL (ref 31–37)
MCV RBC AUTO: 89.3 FL
MONOCYTES # BLD AUTO: 0.54 X10(3) UL (ref 0.1–1)
MONOCYTES NFR BLD AUTO: 6.6 %
NEUTROPHILS # BLD AUTO: 6.05 X10 (3) UL (ref 1.5–7.7)
NEUTROPHILS # BLD AUTO: 6.05 X10(3) UL (ref 1.5–7.7)
NEUTROPHILS NFR BLD AUTO: 74.1 %
OSMOLALITY SERPL CALC.SUM OF ELEC: 286 MOSM/KG (ref 275–295)
OSMOLALITY UR: 259 MOSM/KG (ref 300–1300)
PLATELET # BLD AUTO: 257 10(3)UL (ref 150–450)
POTASSIUM SERPL-SCNC: 3.4 MMOL/L (ref 3.5–5.1)
RBC # BLD AUTO: 3.74 X10(6)UL
SODIUM SERPL-SCNC: 135 MMOL/L (ref 136–145)
WBC # BLD AUTO: 8.2 X10(3) UL (ref 4–11)

## 2025-01-30 PROCEDURE — 85025 COMPLETE CBC W/AUTO DIFF WBC: CPT | Performed by: INTERNAL MEDICINE

## 2025-01-30 PROCEDURE — 80048 BASIC METABOLIC PNL TOTAL CA: CPT | Performed by: INTERNAL MEDICINE

## 2025-01-30 RX ORDER — MYCOPHENOLATE MOFETIL 250 MG/1
500 CAPSULE ORAL 2 TIMES DAILY
Status: DISCONTINUED | OUTPATIENT
Start: 2025-01-30 | End: 2025-02-04

## 2025-01-30 NOTE — PLAN OF CARE
Pt is alert and oriented x4. On room air. Tele ordered. Vital signs stable. Rolling side to side. Guevara 3 way in place. Clamped at first per urology.  Acevedo color urine in bag. CBI resumed after urology was paged. On general diet. Tolerating well. Denies pain and nausea. Aquacel placed on the sacrum. Small tear noted. Multiple small bowel movements. Complains of rectum pain due to hemorrhoids. Safety measures in place. Will continue to monitor.    Problem: Patient Centered Care  Goal: Patient preferences are identified and integrated in the patient's plan of care  Description: Interventions:  - Provide timely, complete, and accurate information to patient/family  - Incorporate patient and family knowledge, values, beliefs, and cultural backgrounds into the planning and delivery of care  - Encourage patient/family to participate in care and decision-making at the level they choose  - Honor patient and family perspectives and choices  Outcome: Progressing     Problem: PAIN - ADULT  Goal: Verbalizes/displays adequate comfort level or patient's stated pain goal  Description: INTERVENTIONS:  - Encourage pt to monitor pain and request assistance  - Assess pain using appropriate pain scale  - Administer analgesics based on type and severity of pain and evaluate response  - Implement non-pharmacological measures as appropriate and evaluate response  - Consider cultural and social influences on pain and pain management  - Manage/alleviate anxiety  - Utilize distraction and/or relaxation techniques  - Monitor for opioid side effects  - Notify MD/LIP if interventions unsuccessful or patient reports new pain  - Anticipate increased pain with activity and pre-medicate as appropriate  Outcome: Progressing     Problem: DISCHARGE PLANNING  Goal: Discharge to home or other facility with appropriate resources  Description: INTERVENTIONS:  - Identify barriers to discharge w/pt and caregiver  - Include patient/family/discharge partner  in discharge planning  - Arrange for needed discharge resources and transportation as appropriate  - Identify discharge learning needs (meds, wound care, etc)  - Arrange for interpreters to assist at discharge as needed  - Consider post-discharge preferences of patient/family/discharge partner  - Complete POLST form as appropriate  - Assess patient's ability to be responsible for managing their own health  - Refer to Case Management Department for coordinating discharge planning if the patient needs post-hospital services based on physician/LIP order or complex needs related to functional status, cognitive ability or social support system  Outcome: Progressing     Problem: GENITOURINARY - ADULT  Goal: Absence of urinary retention  Description: INTERVENTIONS:  - Assess patient’s ability to void and empty bladder  - Monitor intake/output and perform bladder scan as needed  - Follow urinary retention protocol/standard of care  - Consider collaborating with pharmacy to review patient's medication profile  - Implement strategies to promote bladder emptying  Outcome: Progressing     Problem: METABOLIC/FLUID AND ELECTROLYTES - ADULT  Goal: Hemodynamic stability and optimal renal function maintained  Description: INTERVENTIONS:  - Monitor labs and assess for signs and symptoms of volume excess or deficit  - Monitor intake, output and patient weight  - Monitor urine specific gravity, serum osmolarity and serum sodium as indicated or ordered  - Monitor response to interventions for patient's volume status, including labs, urine output, blood pressure (other measures as available)  - Encourage oral intake as appropriate  - Instruct patient on fluid and nutrition restrictions as appropriate  Outcome: Progressing     Problem: HEMATOLOGIC - ADULT  Goal: Maintains hematologic stability  Description: INTERVENTIONS  - Assess for signs and symptoms of bleeding or hemorrhage  - Monitor labs and vital signs for trends  - Administer  supportive blood products/factors, fluids and medications as ordered and appropriate  - Administer supportive blood products/factors as ordered and appropriate  Outcome: Progressing

## 2025-01-30 NOTE — PROGRESS NOTES
[facility] Hospitalist Progress Note     CC: Hospital Follow up    PCP: None Pcp       Assessment/Plan:   89 year old female with PMH Hypertension, Hyperlipidemia, Pancreatic insufficiency, Myasthenia Gravis, who presents from her nursing facility due to concern of possible hematuria/dark urine/and possible UTI.      Hematuria  Urinary retention   Bilateral hydronephrosis   UTI  LISANDRO  -sky placed with CBI, wean per urology   -urology consulted  -IV antibiotic with rocephin IV given in ER  -follow urine and blood cultures---> 100k Gram negative noted in urine, await identification      LISANDRO  hyponatremia  -can stop IV fluids  -can resume her cellcept today   -sky placed  -appreciate renal consult      Myasthenia gravis  -can resume cellcept today     Pancreatic insufficiency  -resume her creon      HTN  -hold hydrochlorothiazide   -hold metoprolol as well as Bps on low side  -avoid hypotension given her LISANDRO     DVT prophylaxis: hold chemical prophy given possible hematuria   Code status: full code     Tejinder Riggins DO  [facility] Hospitalist        Subjective:     Feels ok. No fever  Vitals stable  Renal function is better     OBJECTIVE:    Blood pressure 104/49, pulse 80, temperature 97.7 °F (36.5 °C), temperature source Axillary, resp. rate 18, weight 141 lb (64 kg), SpO2 97%.    Temp:  [97.4 °F (36.3 °C)-97.7 °F (36.5 °C)] 97.7 °F (36.5 °C)  Pulse:  [65-81] 80  Resp:  [14-20] 18  BP: ()/(42-62) 104/49  SpO2:  [94 %-98 %] 97 %      Intake/Output:    Intake/Output Summary (Last 24 hours) at 1/30/2025 1146  Last data filed at 1/30/2025 0945  Gross per 24 hour   Intake 1133 ml   Output 7075 ml   Net -5942 ml       Last 3 Weights   01/29/25 1701 141 lb (64 kg)   01/29/25 1619 140 lb 4.8 oz (63.6 kg)   01/29/25 1156 136 lb 11 oz (62 kg)   01/03/25 2359 137 lb (62.1 kg)   01/01/25 1016 137 lb (62.1 kg)   11/01/24 0913 142 lb 6.4 oz (64.6 kg)       /49 (BP Location: Right arm)   Pulse 80    Temp 97.7 °F (36.5 °C) (Axillary)   Resp 18   Wt 141 lb (64 kg)   LMP  (LMP Unknown)   SpO2 97%   BMI 26.64 kg/m²   General: Alert, no acute distress  Lungs: clear to ausculation bilaterally  Heart: Regular rate and rhythm  Abdomen: soft, non tender  Extremities: No edema  : sky in place with reddish urine but clear  Skin: no new rash, normal color    Data Review:       Labs:     Recent Labs   Lab 01/29/25  1200 01/30/25  0621   RBC 3.68* 3.74*   HGB 11.2* 11.0*   HCT 32.2* 33.4*   MCV 87.5 89.3   MCH 30.4 29.4   MCHC 34.8 32.9   RDW 13.6 13.6   NEPRELIM 12.03* 6.05   WBC 13.8* 8.2   .0 257.0         Recent Labs   Lab 01/29/25  1200 01/29/25  2030 01/30/25  0621   * 131* 90   BUN 47* 41* 31*   CREATSERUM 1.66* 1.27* 0.92   EGFRCR 29* 40* 58*   CA 8.9 9.0 8.9   * 131* 135*   K 3.6 3.6 3.4*   CL 92* 98 103   CO2 26.0 25.0 25.0       Recent Labs   Lab 01/29/25  1200   ALT 17   AST 22   ALB 3.5         Imaging:  CT ABDOMEN+PELVIS KIDNEYSTONE 2D RNDR(NO IV,NO ORAL)(CPT=74176)    Result Date: 1/29/2025  CONCLUSION:  1.  Prominent distention urinary bladder containing 1300 mL of urine.  Correlate for voluntary versus involuntary urinary retention.  Moderate bilateral hydroureteronephrosis extending from the renal calices to the urinary bladder which may represent changes of vascular ureteral reflux/central obstruction. 2.  Colonic diverticulosis. 3.  Coronary atherosclerosis. 4.  Post cholecystectomy.  No significant biliary ductal dilatation. 5.  Scattered large duodenal diverticula.    Dictated by (CST): Tone Saldana MD on 1/29/2025 at 1:32 PM     Finalized by (CST): Tone Saldana MD on 1/29/2025 at 1:37 PM             Meds:      potassium chloride  40 mEq Intravenous Once    mycophenolate mofetil  500 mg Oral BID    atorvastatin  40 mg Oral Nightly    gabapentin  300 mg Oral Nightly    lipase-protease-amylase (Lip-Prot-Amyl)  40,000 Units Oral TID CC    pantoprazole  40 mg Oral QAM     cefTRIAXone  1 g Intravenous Q24H      sodium chloride         acetaminophen    ondansetron

## 2025-01-30 NOTE — PLAN OF CARE
Patient is alert and oriented. Room air. Remote tele in place. Multiple incontinent BMs overnight. Pain managed with prn tylenol. General diet. IVF continued. Guevara in place with CBI continued at a slow-moderate rate. Call light and personal belongings within reach. Safety precautions in place.     Problem: Patient Centered Care  Goal: Patient preferences are identified and integrated in the patient's plan of care  Description: Interventions:  - Provide timely, complete, and accurate information to patient/family  - Incorporate patient and family knowledge, values, beliefs, and cultural backgrounds into the planning and delivery of care  - Encourage patient/family to participate in care and decision-making at the level they choose  - Honor patient and family perspectives and choices  Outcome: Progressing     Problem: PAIN - ADULT  Goal: Verbalizes/displays adequate comfort level or patient's stated pain goal  Description: INTERVENTIONS:  - Encourage pt to monitor pain and request assistance  - Assess pain using appropriate pain scale  - Administer analgesics based on type and severity of pain and evaluate response  - Implement non-pharmacological measures as appropriate and evaluate response  - Consider cultural and social influences on pain and pain management  - Manage/alleviate anxiety  - Utilize distraction and/or relaxation techniques  - Monitor for opioid side effects  - Notify MD/LIP if interventions unsuccessful or patient reports new pain  - Anticipate increased pain with activity and pre-medicate as appropriate  Outcome: Progressing     Problem: DISCHARGE PLANNING  Goal: Discharge to home or other facility with appropriate resources  Description: INTERVENTIONS:  - Identify barriers to discharge w/pt and caregiver  - Include patient/family/discharge partner in discharge planning  - Arrange for needed discharge resources and transportation as appropriate  - Identify discharge learning needs (meds, wound  care, etc)  - Arrange for interpreters to assist at discharge as needed  - Consider post-discharge preferences of patient/family/discharge partner  - Complete POLST form as appropriate  - Assess patient's ability to be responsible for managing their own health  - Refer to Case Management Department for coordinating discharge planning if the patient needs post-hospital services based on physician/LIP order or complex needs related to functional status, cognitive ability or social support system  Outcome: Progressing     Problem: GENITOURINARY - ADULT  Goal: Absence of urinary retention  Description: INTERVENTIONS:  - Assess patient’s ability to void and empty bladder  - Monitor intake/output and perform bladder scan as needed  - Follow urinary retention protocol/standard of care  - Consider collaborating with pharmacy to review patient's medication profile  - Implement strategies to promote bladder emptying  Outcome: Progressing     Problem: METABOLIC/FLUID AND ELECTROLYTES - ADULT  Goal: Hemodynamic stability and optimal renal function maintained  Description: INTERVENTIONS:  - Monitor labs and assess for signs and symptoms of volume excess or deficit  - Monitor intake, output and patient weight  - Monitor urine specific gravity, serum osmolarity and serum sodium as indicated or ordered  - Monitor response to interventions for patient's volume status, including labs, urine output, blood pressure (other measures as available)  - Encourage oral intake as appropriate  - Instruct patient on fluid and nutrition restrictions as appropriate  Outcome: Progressing     Problem: HEMATOLOGIC - ADULT  Goal: Maintains hematologic stability  Description: INTERVENTIONS  - Assess for signs and symptoms of bleeding or hemorrhage  - Monitor labs and vital signs for trends  - Administer supportive blood products/factors, fluids and medications as ordered and appropriate  - Administer supportive blood products/factors as ordered and  appropriate  Outcome: Progressing

## 2025-01-30 NOTE — CM/SW NOTE
APN order for NEGRITA    Admitted From [facility]  Return ref sent  PASRR current, qualifying stay in Jan.    Will confirm w/ pt her intent to return to [facility]. At baseline pt lives at [facility].    1130  CM met w/ pt re plan. Pt sts she wants to look into other [facility] locations and requested [facility] and [facility]    CM explained that beds are very tight right now but, ref will be sent to check for avail options.    Pt vu and wants to return to [facility] as her last choice.    1/31  1100    Per MD CBI is clamped now, if pt does not bleed he will plan to dc her this weekend with ksy in [place].    CM upd PP with above. They have a semi priv room available    CM notified pt of above and provided list of options for pt to make choice.    1350  CM met with pt provided choice. Pt agreeable to semi private room at [facility]    CM reserved.Notified liaison, they request covid test within 48 hrs of dc due to shared room. MD notified.    Notified [facility] of non return    Plan  [facility]   room 210-1,   report: [phone].     / to remain available for support and/or discharge planning.     Rocio Lundy, RN    Ext [phone]

## 2025-01-30 NOTE — CONSULTS
NEPHROLOGY CONSULT NOTE     DATE: 1/29/2025    Requesting Physician: Dr. Riggins     Reason for Consult: LISANDRO      HISTORY OF PRESENT ILLNESS: Sharda Rai is a 89 year old female with PMH sig for HTN, HLD, myasthenia gravis, pancreatic insufficiency.  Recent hospitalization for parainfluenza and norovirus and was discharge to [facility] .  She presents back to the hospital with hematuria.  Patient reports that at [facility] she has been having urinary incontinence when she stands up.  Today she noted blood in her urine, so she was brought to the ED for further evaluation. CT abd showed distended bladder of 1300 ml urine with moderate bilateral hydroureteronephrosis. Guevara was placed and patient was started on CBI. Urology was consulted. Nephrology is consulted for LISANDRO with presenting sCr of 1.66. sCr was 0.64 on 1/10/2025. Na also noted to be low at 129. Patient is currently receiving NS at 83/hr.  Patient currently denies SOB, CP, n/v or abd pain.  She reports chronic leg swelling.       MEDICAL HISTORY:   Past Medical History:    Blepharitis    Dermatochalasis    Dry eye syndrome of both eyes    Essential hypertension    Gallstones    High blood pressure    High cholesterol    Hyperlipidemia    Macular pucker, bilateral    Myasthenia gravis (HCC)    Pancreatic insufficiency (HCC)    Pseudophakia, both eyes       SURGICAL HISTORY:   Past Surgical History:   Procedure Laterality Date    Carpal tunnel release Bilateral     Cataract      Cataract extraction w/  intraocular lens implant Left 02/20/2013    Dr. Roseann frost trad     Cataract extraction w/  intraocular lens implant Right     Cholecystectomy      Laparoscopic cholecystectomy  10/19/2024    XI ROBOT-ASSISTED LAPAROSCOPIC CHOLECYSTECTOMY    Yag capsulotomy - od - right eye Right 04/10/2007       FAMILY HISTORY:   Family History   Problem Relation Age of Onset    Retinal detachment Father     Glaucoma Mother        SOCIAL HISTORY:   Social History     Socioeconomic History     Marital status:      Spouse name: Not on file    Number of children: Not on file    Years of education: Not on file    Highest education level: Not on file   Occupational History    Not on file   Tobacco Use    Smoking status: Never    Smokeless tobacco: Never   Vaping Use    Vaping status: Never Used   Substance and Sexual Activity    Alcohol use: Never    Drug use: Never    Sexual activity: Not on file   Other Topics Concern    Caffeine Concern Not Asked    Exercise Not Asked    Seat Belt Not Asked    Special Diet Not Asked    Stress Concern Not Asked    Weight Concern Not Asked   Social History Narrative    Not on file     Social Drivers of Health     Financial Resource Strain: Not on file   Food Insecurity: No Food Insecurity (1/29/2025)    Food Insecurity     Food Insecurity: Never true   Transportation Needs: No Transportation Needs (1/29/2025)    Transportation Needs     Lack of Transportation: No     Car Seat: Not on file   Physical Activity: Not on file   Stress: Not on file   Social Connections: Unknown (3/14/2021)    [facility]    Social Connections     Conversations with friends/family/neighbors per week: Not on file   Housing Stability: Low Risk  (1/29/2025)    Housing Stability     Housing Instability: No     Housing Instability Emergency: Not on file     Crib or [place]in[place]: Not on file       MEDICATIONS:   Current Facility-Administered Medications   Medication Dose Route Frequency    sodium chloride 0.9 % irrigation 3,000 mL  3,000 mL Irrigation Continuous    acetaminophen (Tylenol) tab 650 mg  650 mg Oral Q6H PRN    atorvastatin (Lipitor) tab 40 mg  40 mg Oral Nightly    gabapentin (Neurontin) cap 300 mg  300 mg Oral Nightly    pancrelipase (Lip-Prot-Amyl) (Zenpep) DR particles cap 40,000 Units  40,000 Units Oral TID CC    [START ON 1/30/2025] pantoprazole (Protonix) DR tab 40 mg  40 mg Oral QAM    sodium chloride 0.9%  infusion   Intravenous Continuous    ondansetron (Zofran) 4 MG/2ML injection 4 mg  4 mg Intravenous Q6H PRN    [START ON 1/30/2025] cefTRIAXone (Rocephin) 1 g in sodium chloride 0.9% 100 mL IVPB-ADDV  1 g Intravenous Q24H         ALLERGIES: Allergies[1]    REVIEW OF SYSTEMS:  A comprehensive review of systems was conducted and is negative except for what is mentioned in the HPI       PHYSICAL EXAM:  /62 (BP Location: Right arm)   Pulse 75   Temp 97.5 °F (36.4 °C) (Oral)   Resp 18   Wt 141 lb (64 kg)   LMP  (LMP Unknown)   SpO2 95%   BMI 26.64 kg/m²   GEN:  NAD  HEENT: NCAT    CHEST: CTA b/l  CARDIAC: S1S2 normal  ABD: soft, NT/ND  : sky with CBI   EXT: no lower ext edema  NEURO: awake, alert  SKIN: warm, dry      LABS:  Recent Labs   Lab 01/29/25  1200   *   BUN 47*   CREATSERUM 1.66*   EGFRCR 29*   CA 8.9   *   K 3.6   CL 92*   CO2 26.0     Recent Labs   Lab 01/29/25  1200   RBC 3.68*   HGB 11.2*   HCT 32.2*   MCV 87.5   MCH 30.4   MCHC 34.8   RDW 13.6   NEPRELIM 12.03*   WBC 13.8*   .0           INTAKE/OUTPUT:    Intake/Output Summary (Last 24 hours) at 1/29/2025 1941  Last data filed at 1/29/2025 1851  Gross per 24 hour   Intake 220 ml   Output 4100 ml   Net -3880 ml       IMAGING:  CT ABDOMEN+PELVIS KIDNEYSTONE 2D RNDR(NO IV,NO ORAL)(CPT=74176)    Result Date: 1/29/2025  CONCLUSION:  1.  Prominent distention urinary bladder containing 1300 mL of urine.  Correlate for voluntary versus involuntary urinary retention.  Moderate bilateral hydroureteronephrosis extending from the renal calices to the urinary bladder which may represent changes of vascular ureteral reflux/central obstruction. 2.  Colonic diverticulosis. 3.  Coronary atherosclerosis. 4.  Post cholecystectomy.  No significant biliary ductal dilatation. 5.  Scattered large duodenal diverticula.    Dictated by (CST): Tone Saldana MD on 1/29/2025 at 1:32 PM     Finalized by (CST): Tone Saldana MD on 1/29/2025 at 1:37 PM               ASSESSMENT AND PLAN:   This is a 89 year old female with PMH sig for HTN, HLD, myasthenia gravis, pancreatic insufficiency.  Presents with hematuria, urinary retention and moderate bilateral hydroureteronephrosis. Nephrology is consulted for LISANDRO.     LISANDRO   -presenting sCr of 1.66. sCr was 0.64 on 1/10/2025.  - suspect due to urinary retention, moderate bilateral hydroureteronephrosis.  - continue IVFs  - obtain urine lytes   - repeat labs   - follow renal fxn and I/Os  - renally dose meds     Urinary retention  Hematuria   Moderate bilateral hydroureteronephrosis  - per Urology   - has asya, currently on CBI      Hyponatremia   - Na 129   - suspect due to urinary retention   - obtain urine Na and urine osm   - repeat Na level   - hold hydrochlorothiazide      Thank you for the consult and allowing us to participate in the care of Sharda Rai.  We will continue to follow.    Candace Guaman MD  [facility]  1/29/2025         [1]   Allergies  Allergen Reactions    Fish-Derived Products OTHER (SEE COMMENTS)     Stomach cramps    Shellfish DIARRHEA

## 2025-01-30 NOTE — PROGRESS NOTES
NEPHROLOGY DAILY PROGRESS NOTE       SUBJECTIVE:  Tearful today, reports having pain all over    Remains on CBI     OBJECTIVE:    Total Intake/Output:    Intake/Output Summary (Last 24 hours) at 1/30/2025 1055  Last data filed at 1/30/2025 0945  Gross per 24 hour   Intake 1133 ml   Output 7075 ml   Net -5942 ml           PHYSICAL EXAM:  /49 (BP Location: Right arm)   Pulse 80   Temp 97.7 °F (36.5 °C) (Axillary)   Resp 18   Wt 141 lb (64 kg)   LMP  (LMP Unknown)   SpO2 97%   BMI 26.64 kg/m²   GEN:  NAD  HEENT: NCAT    CHEST: CTA b/l  CARDIAC: S1S2 normal  ABD: soft, NT/ND  : sky with CBI   EXT: no lower ext edema  NEURO: awake, alert    CURRENT MEDICATIONS:  Current Facility-Administered Medications   Medication Dose Route Frequency    potassium chloride 40 mEq in 250mL sodium chloride 0.9% IVPB premix  40 mEq Intravenous Once    mycophenolate mofetil (Cellcept) cap 500 mg  500 mg Oral BID    sodium chloride 0.9 % irrigation 3,000 mL  3,000 mL Irrigation Continuous    acetaminophen (Tylenol) tab 650 mg  650 mg Oral Q6H PRN    atorvastatin (Lipitor) tab 40 mg  40 mg Oral Nightly    gabapentin (Neurontin) cap 300 mg  300 mg Oral Nightly    pancrelipase (Lip-Prot-Amyl) (Zenpep) DR particles cap 40,000 Units  40,000 Units Oral TID CC    pantoprazole (Protonix) DR tab 40 mg  40 mg Oral QAM    sodium chloride 0.9% infusion   Intravenous Continuous    ondansetron (Zofran) 4 MG/2ML injection 4 mg  4 mg Intravenous Q6H PRN    cefTRIAXone (Rocephin) 1 g in sodium chloride 0.9% 100 mL IVPB-ADDV  1 g Intravenous Q24H           LABS:  Patient Labs Reviewed in Detail. Pertinent Labs as follows:  Recent Labs   Lab 01/29/25  1200 01/29/25  2030 01/30/25  0621   * 131* 90   BUN 47* 41* 31*   CREATSERUM 1.66* 1.27* 0.92   EGFRCR 29* 40* 58*   CA 8.9 9.0 8.9   * 131* 135*   K 3.6 3.6 3.4*   CL 92* 98 103   CO2 26.0 25.0 25.0     Recent Labs   Lab 01/29/25  1200 01/30/25  0621   RBC 3.68* 3.74*   HGB 11.2*  11.0*   HCT 32.2* 33.4*   MCV 87.5 89.3   MCH 30.4 29.4   MCHC 34.8 32.9   RDW 13.6 13.6   NEPRELIM 12.03* 6.05   WBC 13.8* 8.2   .0 257.0         IMAGING:  CT ABDOMEN+PELVIS KIDNEYSTONE 2D RNDR(NO IV,NO ORAL)(CPT=74176)    Result Date: 1/29/2025  CONCLUSION:  1.  Prominent distention urinary bladder containing 1300 mL of urine.  Correlate for voluntary versus involuntary urinary retention.  Moderate bilateral hydroureteronephrosis extending from the renal calices to the urinary bladder which may represent changes of vascular ureteral reflux/central obstruction. 2.  Colonic diverticulosis. 3.  Coronary atherosclerosis. 4.  Post cholecystectomy.  No significant biliary ductal dilatation. 5.  Scattered large duodenal diverticula.    Dictated by (CST): Tone Saldana MD on 1/29/2025 at 1:32 PM     Finalized by (CST): Tone Saldana MD on 1/29/2025 at 1:37 PM            ASSESSMENT AND PLAN:   This is a 89 year old female with PMH sig for HTN, HLD, myasthenia gravis, pancreatic insufficiency.  Presents with hematuria, urinary retention and moderate bilateral hydroureteronephrosis. Nephrology is consulted for LISANDRO.      LISANDRO   -presenting sCr of 1.66. sCr was 0.64 on 1/10/2025.  - suspect due to urinary retention, moderate bilateral hydroureteronephrosis   - resolved      Urinary retention  Hematuria   Moderate bilateral hydroureteronephrosis  - per Urology   - has sky, currently on CBI       Hyponatremia   - Na 129 on presentation   - suspect due to urinary retention, hypovolemia  - Na improving to 135   - will stop IVFs   - hold hydrochlorothiazide      Hypokalemia   - replace per protocol       We will continue to follow Sharda Guaman MD  [facility]

## 2025-01-30 NOTE — PROGRESS NOTES
Antimicrobial Dose Adjustment for Ceftriaxone    Indication:  Low dose (cystitis, upper respiratory infections, SBP prophylaxis)  Anthropometrics:  Wt Readings from Last 1 Encounters:   01/29/25 64 kg (141 lb)         Ceftriaxone adjusted per P&T protocol.

## 2025-01-30 NOTE — CONSULTS
[facility]  [facility]  Consultation Note    Sharda Rai Patient Status:  Inpatient    1932 MRN [id number]   Location [facility] 4W/SW/SE Attending Tejinder Riggins,    Hosp Day # 1 PCP None Pcp     Reason for Consultation:  Hematuria, urinary retention, acute renal failure     History of Present Illness:  Sharda Rai is a 89 year old female with multiple medical issues admitted for issue of gross hematuria.  Patient c/o urinary stress incontinence but no dysuria, fevers/chills, or suprapubic pain,  She noted blood per urine and immediately came to ER.  Further assessment with CT imaging showed severely distended bladder, approx 1300 ml with bilateral hydroureteronephrosis.  She was noted to have ARF likely related to urinary retention.  Patient was initiated on CBI for hematuria, but her urine appears clear this am.      CT ab/pel   1.  Prominent distention urinary bladder containing 1300 mL of urine.  Correlate for voluntary versus involuntary urinary retention.  Moderate bilateral hydroureteronephrosis extending from the renal calices to the urinary bladder which may represent changes of vascular ureteral reflux/central obstruction.   2.  Colonic diverticulosis.   3.  Coronary atherosclerosis.   4.  Post cholecystectomy.  No significant biliary ductal dilatation.   5.  Scattered large duodenal diverticula.     History:  Past Medical History:    Blepharitis    Dermatochalasis    Dry eye syndrome of both eyes    Essential hypertension    Gallstones    High blood pressure    High cholesterol    Hyperlipidemia    Macular pucker, bilateral    Myasthenia gravis (HCC)    Pancreatic insufficiency (HCC)    Pseudophakia, both eyes     Past Surgical History:   Procedure Laterality Date    Carpal tunnel release Bilateral     Cataract      Cataract extraction w/  intraocular lens implant Left 2013    Dr. Roseann leo     Cataract extraction w/  intraocular lens implant Right      Cholecystectomy      Laparoscopic cholecystectomy  10/19/2024    XI ROBOT-ASSISTED LAPAROSCOPIC CHOLECYSTECTOMY    Yag capsulotomy - od - right eye Right 04/10/2007     Family History   Problem Relation Age of Onset    Retinal detachment Father     Glaucoma Mother       reports that she has never smoked. She has never used smokeless tobacco. She reports that she does not drink alcohol and does not use drugs.    Allergies:  Allergies[1]    Medications:    Current Facility-Administered Medications:     [COMPLETED] Sky / urology care, , , Until Discontinued **AND** [COMPLETED] Sky / urology care, , , Continuous **AND** sodium chloride 0.9 % irrigation 3,000 mL, 3,000 mL, Irrigation, Continuous    acetaminophen (Tylenol) tab 650 mg, 650 mg, Oral, Q6H PRN    atorvastatin (Lipitor) tab 40 mg, 40 mg, Oral, Nightly    gabapentin (Neurontin) cap 300 mg, 300 mg, Oral, Nightly    pancrelipase (Lip-Prot-Amyl) (Zenpep) DR particles cap 40,000 Units, 40,000 Units, Oral, TID CC    pantoprazole (Protonix) DR tab 40 mg, 40 mg, Oral, QAM    sodium chloride 0.9% infusion, , Intravenous, Continuous    ondansetron (Zofran) 4 MG/2ML injection 4 mg, 4 mg, Intravenous, Q6H PRN    cefTRIAXone (Rocephin) 1 g in sodium chloride 0.9% 100 mL IVPB-ADDV, 1 g, Intravenous, Q24H    Review of Systems:  Pertinent items are noted in HPI.    Physical Exam:  /49 (BP Location: Right arm)   Pulse 80   Temp 97.7 °F (36.5 °C) (Axillary)   Resp 18   Wt 141 lb (64 kg)   LMP  (LMP Unknown)   SpO2 97%   BMI 26.64 kg/m²   GENERAL: well developed, well nourished, no apparent distress  EYES: sclera non-icteric, no redness   LUNGS: normal respiratory motion without distress  GI: Abdomen soft without organomegally, no tenderness  : sky in place, urine yellow   NEURO: Alert and Oriented, motor and sensory grossly non-focal   LYMPH:  No appreciable axillary, neck  Adenopathy  SKIN: No rashes, no discoloration  EXTREMITIES: No appreciable joint  deformities     Laboratory Data:  Lab Results   Component Value Date    WBC 8.2 01/30/2025    HGB 11.0 01/30/2025    HCT 33.4 01/30/2025    .0 01/30/2025    CREATSERUM 1.27 01/29/2025    BUN 41 01/29/2025     01/29/2025    K 3.6 01/29/2025    CL 98 01/29/2025    CO2 25.0 01/29/2025     01/29/2025    CA 9.0 01/29/2025    ALB 3.5 01/29/2025    ALKPHO 97 01/29/2025    BILT 0.7 01/29/2025    TP 6.0 01/29/2025    AST 22 01/29/2025    ALT 17 01/29/2025     Lab Results   Component Value Date    COLORUR Red 01/29/2025    CLARITY Cloudy 01/29/2025    SPECGRAVITY 1.020 01/29/2025           Impression/Plan:    Urinary retention, >1300 ml (per CT imaging)                              - recommend continue sky for now for bladder reset                               - may consider a void trial in a few days   Gross painless hematuria                               - possibly related to over-distention of bladder, possible UTI (await urine cx)                              - plan to wean CBI to off today since urine clear   Acute renal failure                               - secondary to above, urine retention                               - trend serum creatinine                               - nephrology following       Thank you for allowing me to participate in the care of your patient.    Jaki Neves MD  [facility]  1/30/2025           [1]   Allergies  Allergen Reactions    Fish-Derived Products OTHER (SEE COMMENTS)     Stomach cramps    Shellfish DIARRHEA

## 2025-01-31 LAB
ANION GAP SERPL CALC-SCNC: 5 MMOL/L (ref 0–18)
BASOPHILS # BLD AUTO: 0.03 X10(3) UL (ref 0–0.2)
BASOPHILS NFR BLD AUTO: 0.4 %
BUN BLD-MCNC: 15 MG/DL (ref 9–23)
BUN/CREAT SERPL: 28.3 (ref 10–20)
CALCIUM BLD-MCNC: 8.7 MG/DL (ref 8.7–10.4)
CHLORIDE SERPL-SCNC: 104 MMOL/L (ref 98–112)
CO2 SERPL-SCNC: 28 MMOL/L (ref 21–32)
CREAT BLD-MCNC: 0.53 MG/DL
DEPRECATED RDW RBC AUTO: 44.7 FL (ref 35.1–46.3)
EGFRCR SERPLBLD CKD-EPI 2021: 87 ML/MIN/1.73M2 (ref 60–?)
EOSINOPHIL # BLD AUTO: 0.37 X10(3) UL (ref 0–0.7)
EOSINOPHIL NFR BLD AUTO: 5.4 %
ERYTHROCYTE [DISTWIDTH] IN BLOOD BY AUTOMATED COUNT: 13.5 % (ref 11–15)
GLUCOSE BLD-MCNC: 92 MG/DL (ref 70–99)
HCT VFR BLD AUTO: 29.9 %
HGB BLD-MCNC: 10.1 G/DL
IMM GRANULOCYTES # BLD AUTO: 0.1 X10(3) UL (ref 0–1)
IMM GRANULOCYTES NFR BLD: 1.5 %
LYMPHOCYTES # BLD AUTO: 0.96 X10(3) UL (ref 1–4)
LYMPHOCYTES NFR BLD AUTO: 14.1 %
MCH RBC QN AUTO: 30.4 PG (ref 26–34)
MCHC RBC AUTO-ENTMCNC: 33.8 G/DL (ref 31–37)
MCV RBC AUTO: 90.1 FL
MONOCYTES # BLD AUTO: 0.5 X10(3) UL (ref 0.1–1)
MONOCYTES NFR BLD AUTO: 7.3 %
NEUTROPHILS # BLD AUTO: 4.86 X10 (3) UL (ref 1.5–7.7)
NEUTROPHILS # BLD AUTO: 4.86 X10(3) UL (ref 1.5–7.7)
NEUTROPHILS NFR BLD AUTO: 71.3 %
OSMOLALITY SERPL CALC.SUM OF ELEC: 284 MOSM/KG (ref 275–295)
PLATELET # BLD AUTO: 243 10(3)UL (ref 150–450)
POTASSIUM SERPL-SCNC: 3.6 MMOL/L (ref 3.5–5.1)
POTASSIUM SERPL-SCNC: 3.6 MMOL/L (ref 3.5–5.1)
RBC # BLD AUTO: 3.32 X10(6)UL
SODIUM SERPL-SCNC: 137 MMOL/L (ref 136–145)
WBC # BLD AUTO: 6.8 X10(3) UL (ref 4–11)

## 2025-01-31 PROCEDURE — 97535 SELF CARE MNGMENT TRAINING: CPT

## 2025-01-31 PROCEDURE — 85025 COMPLETE CBC W/AUTO DIFF WBC: CPT | Performed by: INTERNAL MEDICINE

## 2025-01-31 PROCEDURE — 80048 BASIC METABOLIC PNL TOTAL CA: CPT | Performed by: INTERNAL MEDICINE

## 2025-01-31 PROCEDURE — 97530 THERAPEUTIC ACTIVITIES: CPT

## 2025-01-31 PROCEDURE — 97162 PT EVAL MOD COMPLEX 30 MIN: CPT

## 2025-01-31 PROCEDURE — 97166 OT EVAL MOD COMPLEX 45 MIN: CPT

## 2025-01-31 PROCEDURE — 84132 ASSAY OF SERUM POTASSIUM: CPT | Performed by: INTERNAL MEDICINE

## 2025-01-31 RX ORDER — METOPROLOL SUCCINATE 50 MG/1
50 TABLET, EXTENDED RELEASE ORAL
Status: DISCONTINUED | OUTPATIENT
Start: 2025-01-31 | End: 2025-02-04

## 2025-01-31 NOTE — PROGRESS NOTES
[facility]  part of [facility]    Progress Note    Sharda Rai Patient Status:  Inpatient    1932 MRN [id number]   Location [facility] 4W/SW/SE Attending Tejinder Riggins,    Hosp Day # 2 PCP None Pcp     Subjective:   Sharda Rai is a(n) 89 year old female with UR 1300ml, UTI and gross hematuria. Urine clear this am    Objective:   Blood pressure 133/62, pulse 98, temperature 98 °F (36.7 °C), temperature source Oral, resp. rate 18, weight 141 lb (64 kg), SpO2 96%.    GENERAL: well developed, well nourished, in no apparent distress  HEENT: atraumatic, normocephalic  LUNGS: normal respiratory motion without distress  CARDIO:NA  Abd: Soft, non-tender, non-distended  : No SPT or CVAT, 22F 3 way in place with clear urine. Irrigated and no clots. CBI clamped      Results:   Lab Results   Component Value Date    WBC 6.8 2025    HGB 10.1 (L) 2025    HCT 29.9 (L) 2025    .0 2025    CREATSERUM 0.53 (L) 2025    BUN 15 2025     2025    K 3.6 2025    K 3.6 2025     2025    CO2 28.0 2025    GLU 92 2025    CA 8.7 2025    ALB 3.5 2025    ALKPHO 97 2025    BILT 0.7 2025    TP 6.0 2025    AST 22 2025    ALT 17 2025    TSH 3.27 2018    LIP 24 10/18/2024    ESRML 27 10/10/2019    CRP <0.29 10/10/2019    MG 1.8 01/10/2025    TROPHS 8 2025       CT ABDOMEN+PELVIS KIDNEYSTONE 2D RNDR(NO IV,NO ORAL)(CPT=74176)    Result Date: 2025  CONCLUSION:  1.  Prominent distention urinary bladder containing 1300 mL of urine.  Correlate for voluntary versus involuntary urinary retention.  Moderate bilateral hydroureteronephrosis extending from the renal calices to the urinary bladder which may represent changes of vascular ureteral reflux/central obstruction. 2.  Colonic diverticulosis. 3.  Coronary atherosclerosis. 4.  Post cholecystectomy.  No significant  biliary ductal dilatation. 5.  Scattered large duodenal diverticula.    Dictated by (CST): Tone Saldana MD on 1/29/2025 at 1:32 PM     Finalized by (CST): Tone Saldana MD on 1/29/2025 at 1:37 PM         EKG 12 Lead    Result Date: 1/29/2025  Normal sinus rhythm Normal ECG When compared with ECG of 01-JAN-2025 10:23, Premature ventricular complexes are no longer Present Vent. rate has decreased BY  39 BPM ST no longer depressed in Anterior leads Confirmed by miguel pérez ([id number]) on 1/29/2025 5:53:26 PM     Assessment & Plan:     89yo F with UTI, UR 1300ml causing b/l hydroureteronephrosis and gross hematuria    Plan:  -Gross hem likely 2/2 overdistention and UTI. Urine clear this am and no clots. CBI clamped and discussed w nurse  -Will need catheter for about a week for bladder rest as patient currently also immobile and will likely fail voiding trial  -Cr improved since catheter placed no need for f/u US      Jaymie Khan,   1/31/2025

## 2025-01-31 NOTE — PLAN OF CARE
Problem: PAIN - ADULT  Goal: Verbalizes/displays adequate comfort level or patient's stated pain goal  Description: INTERVENTIONS:  - Encourage pt to monitor pain and request assistance  - Assess pain using appropriate pain scale  - Administer analgesics based on type and severity of pain and evaluate response  - Implement non-pharmacological measures as appropriate and evaluate response  - Consider cultural and social influences on pain and pain management  - Manage/alleviate anxiety  - Utilize distraction and/or relaxation techniques  - Monitor for opioid side effects  - Notify MD/LIP if interventions unsuccessful or patient reports new pain  - Anticipate increased pain with activity and pre-medicate as appropriate  Outcome: Progressing     Problem: GENITOURINARY - ADULT  Goal: Absence of urinary retention  Description: INTERVENTIONS:  - Assess patient’s ability to void and empty bladder  - Monitor intake/output and perform bladder scan as needed  - Follow urinary retention protocol/standard of care  - Consider collaborating with pharmacy to review patient's medication profile  - Implement strategies to promote bladder emptying  Outcome: Progressing

## 2025-01-31 NOTE — PROGRESS NOTES
[facility] Hospitalist Progress Note     CC: Hospital Follow up    PCP: None Pcp       Assessment/Plan:   89 year old female with PMH Hypertension, Hyperlipidemia, Pancreatic insufficiency, Myasthenia Gravis, who presents from her nursing facility due to concern of possible hematuria/dark urine/and possible UTI.      Hematuria  Urinary retention   Bilateral hydronephrosis   UTI  LISANDRO  -sky placed with CBI, now CBI clamped   -urology consulted  -IV antibiotic with rocephin IV---narrow to IV ancef given sensitivity      LISANDRO-improved  Hyponatremia-improved  -stopped fluids on 1/30  -sky placed--remain in place for about a week  -appreciate renal/urology consult     Myasthenia gravis  -resume cellcept      Pancreatic insufficiency  -resume her creon      HTN  -hold hydrochlorothiazide, may be able to resume at discharge possibly or in future   -can resume Toprol 50mg daily today      DVT prophylaxis: hold chemical prophy given hematuria   Code status: full code    Dispo: [facility], case management following. Medically stable by tomorrow, facility can take her on weekend      Tejinder Riggins DO  [facility] Hospitalist      Subjective:     Doing ok, has rectal pain  Prior hx of hemorrhoids  Vitals improving.   Cr improved  CBI clamped     OBJECTIVE:    Blood pressure 133/62, pulse 98, temperature 98 °F (36.7 °C), temperature source Oral, resp. rate 18, weight 141 lb (64 kg), SpO2 96%.    Temp:  [97.6 °F (36.4 °C)-98.6 °F (37 °C)] 98 °F (36.7 °C)  Pulse:  [91-98] 98  Resp:  [18] 18  BP: (107-135)/(62-79) 133/62  SpO2:  [94 %-96 %] 96 %      Intake/Output:    Intake/Output Summary (Last 24 hours) at 1/31/2025 1056  Last data filed at 1/31/2025 0758  Gross per 24 hour   Intake 240 ml   Output 3590 ml   Net -3350 ml       Last 3 Weights   01/29/25 1701 141 lb (64 kg)   01/29/25 1619 140 lb 4.8 oz (63.6 kg)   01/29/25 1156 136 lb 11 oz (62 kg)   01/03/25 2359 137 lb (62.1 kg)   01/01/25 1016 137 lb (62.1 kg)    11/01/24 0913 142 lb 6.4 oz (64.6 kg)       /62 (BP Location: Left arm)   Pulse 98   Temp 98 °F (36.7 °C) (Oral)   Resp 18   Wt 141 lb (64 kg)   LMP  (LMP Unknown)   SpO2 96%   BMI 26.64 kg/m²   General: Alert, no acute distress  Lungs: clear to ausculation bilaterally  Heart: Regular rate and rhythm  Abdomen: soft, non tender  Extremities: No edema  : sky in place with reddish urine but clear  Skin: no new rash, normal color    Data Review:       Labs:     Recent Labs   Lab 01/29/25  1200 01/30/25  0621 01/31/25  0634   RBC 3.68* 3.74* 3.32*   HGB 11.2* 11.0* 10.1*   HCT 32.2* 33.4* 29.9*   MCV 87.5 89.3 90.1   MCH 30.4 29.4 30.4   MCHC 34.8 32.9 33.8   RDW 13.6 13.6 13.5   NEPRELIM 12.03* 6.05 4.86   WBC 13.8* 8.2 6.8   .0 257.0 243.0         Recent Labs   Lab 01/29/25  2030 01/30/25  0621 01/31/25  0634   * 90 92   BUN 41* 31* 15   CREATSERUM 1.27* 0.92 0.53*   EGFRCR 40* 58* 87   CA 9.0 8.9 8.7   * 135* 137   K 3.6 3.4* 3.6  3.6   CL 98 103 104   CO2 25.0 25.0 28.0       Recent Labs   Lab 01/29/25  1200   ALT 17   AST 22   ALB 3.5         Imaging:  CT ABDOMEN+PELVIS KIDNEYSTONE 2D RNDR(NO IV,NO ORAL)(CPT=74176)    Result Date: 1/29/2025  CONCLUSION:  1.  Prominent distention urinary bladder containing 1300 mL of urine.  Correlate for voluntary versus involuntary urinary retention.  Moderate bilateral hydroureteronephrosis extending from the renal calices to the urinary bladder which may represent changes of vascular ureteral reflux/central obstruction. 2.  Colonic diverticulosis. 3.  Coronary atherosclerosis. 4.  Post cholecystectomy.  No significant biliary ductal dilatation. 5.  Scattered large duodenal diverticula.    Dictated by (CST): Tone Saldana MD on 1/29/2025 at 1:32 PM     Finalized by (CST): Tone Saldana MD on 1/29/2025 at 1:37 PM             Meds:      ceFAZolin  2 g Intravenous Q8H    metoprolol succinate ER  50 mg Oral Daily Beta Blocker    mycophenolate  mofetil  500 mg Oral BID    atorvastatin  40 mg Oral Nightly    gabapentin  300 mg Oral Nightly    lipase-protease-amylase (Lip-Prot-Amyl)  40,000 Units Oral TID CC    pantoprazole  40 mg Oral QAM      sodium chloride         acetaminophen    ondansetron

## 2025-01-31 NOTE — OCCUPATIONAL THERAPY NOTE
OCCUPATIONAL THERAPY EVALUATION - INPATIENT     Room Number: 459/459-A  Evaluation Date: 1/31/2025  Type of Evaluation: Initial   Presenting Problem: Hematuria, urinary retention, acute renal failure   Co-Morbidities: Hypertension, Hyperlipidemia, Pancreatic insufficiency, Myasthenia Gravis     Physician Order: IP Consult to Occupational Therapy  Reason for Therapy: ADL/IADL Dysfunction and Discharge Planning    OCCUPATIONAL THERAPY ASSESSMENT   Patient is a 89 year old female admitted 1/29/2025 for Hematuria, urinary retention, acute renal failure.  Prior to admission, patient was at a gradual rehab center receiving assist for all ADLs, following an admission from 1/2-1/10 for parainfluenza, norovirus, and bacterial pneumonia. Prior to that admission pt was mod I for functional mobility and received PRN assist for ADLS/bathing from spouse.  Patient is currently functioning below baseline with toileting, bathing, upper body dressing, lower body dressing, bed mobility, transfers, static standing balance, dynamic standing balance, functional standing tolerance, and energy conservation strategies.  Patient is requiring  max assist for functional transfers, min assist for static standing, and max assist for ADLs  as a result of the following impairments: decreased functional strength, decreased endurance, pain, impaired standing balance, decreased muscular endurance, and medical status. Occupational Therapy will continue to follow for duration of hospitalization.    Patient will benefit from continued skilled OT Services to promote return to prior level of function and safety with continuous assistance and gradual rehabilitative therapy.    PLAN DURING HOSPITALIZATION     OT Treatment Plan: Energy conservation/work simplification techniques;ADL training;Functional transfer training;Endurance training;Balance activities;Patient/Family education;Patient/Family training;Compensatory technique education     OCCUPATIONAL  THERAPY MEDICAL/SOCIAL HISTORY   Problem List  Principal Problem:    Hematuria, unspecified type  Active Problems:    Anemia    Acute renal failure (ARF) (HCC)    Acute kidney injury (HCC)    Acute cystitis with hematuria    Urinary retention    HOME SITUATION  Type of Home: Skilled nursing facility  Home Layout: One level  Lives With: Staff 24 hours    Prior Level of [place]: Prior to previous admission (1/1/25-1/10/25) pt was mod I for functional mobility with rollator and PRN assist from spouse for ADLs. Prior to this admission pt was at gradual rehab where she was receiving assist for all ADLs, functional mobility, and functional transfers.     SUBJECTIVE  \"I am nervous to get up. I've been in bed for the last few days.\"     OCCUPATIONAL THERAPY EXAMINATION      OBJECTIVE       PAIN ASSESSMENT  Rating: Unable to rate  Location: Buttocks  Management Techniques: Activity promotion; Relaxation; Repositioning; Nurse notified      ACTIVITY TOLERANCE  Pulse: (!) 128  Heart Rate Source: Monitor     BP: 125/54  BP Location: Right arm  BP Method: Automatic  Patient Position: Sitting      COGNITION  Overall Cognitive Status:  WFL - within functional limits    Behavioral/Emotional/Social: Pt very motivated to work with therapy but became emotional during activity and verbalizing frustration and sadness about how weak she is now.     RANGE OF MOTION   Upper extremity ROM is within functional limits     STRENGTH ASSESSMENT  Upper extremity strength is within functional limits     COORDINATION  Gross Motor: WFL   Fine Motor: WFL     ACTIVITIES OF DAILY LIVING ASSESSMENT  AM-PAC ‘6-Clicks’ Inpatient Daily Activity Short Form  How much help from another person does the patient currently need…  -   Putting on and taking off regular lower body clothing?: A Lot  -   Bathing (including washing, rinsing, drying)?: A Lot  -   Toileting, which includes using toilet, bedpan or urinal? : A Lot  -   Putting on and taking off regular  upper body clothing?: A Little  -   Taking care of personal grooming such as brushing teeth?: A Little  -   Eating meals?: None    AM-PAC Score:  Score: 16  Approx Degree of Impairment: 53.32%  Standardized Score (AM-PAC Scale): 35.96  CMS Modifier (G-Code): CK    FUNCTIONAL TRANSFER ASSESSMENT  Sit to Stand: Edge of Bed; Chair  Edge of Bed: Maximum Assist  Chair: Minimal Assist  Simulated commode transfer: Max assist stand pivot     BED MOBILITY  Supine to Sit : Minimal Assist    BALANCE ASSESSMENT  Static Sitting: Stand-by Assist  Static Standing: Minimal Assist  Dynamic sitting: SBA  Dynamic Standing: Max assist      FUNCTIONAL ADL ASSESSMENT  UB Dressing Seated: Minimal Assist  Toileting Standing: Maximum Assist       Skilled Therapy Provided:   RN cleared pt for participation. Session coordinated with PT. Pt received in bed with no visitors yet. Pt agreeable to participation in therapy. Pt tolerated treatment fairly well, and vitals were monitored throughout. Pt with elevated HR throughout activity (128bpm). RN aware. To assess pt's participation during tasks while also providing intermittent education to maximize participation, OT facilitated the following: bed mobility, functional transfers, and ADLs. Pt incontinent of bowel during simulated commode transfer, requiring max assist for cecy cares while in standing. Pt able to tolerate ~ 2 min stand with RW support during cecy cares. Pt very fatigued after stand and not able to progress with further mobility at this time. Pt remained in recliner with alarm on and all needs in reach at end of session. RN aware.         EDUCATION PROVIDED  Patient Education : Role of Occupational Therapy; Plan of Care; Functional Transfer Techniques; Fall Prevention; Posture/Positioning; Proper Body Mechanics; Energy Conservation  Patient's Response to Education: Verbalized Understanding; Returned Demonstration    The patient's Approx Degree of Impairment: 53.32% has been  calculated based on documentation in the [facility] '6 clicks' Inpatient Daily Activity Short Form.  Research supports that patients with this level of impairment may benefit from rehab.  Final disposition will be made by interdisciplinary medical team.     Patient End of Session: Up in chair;Needs met;Call light within reach;RN aware of session/findings;All patient questions and concerns addressed;Hospital anti-slip socks;Alarm set    OT Goals  Patients self stated goal is: none stated      Patient will complete functional transfer with CGA  Comment:     Patient will complete toileting with CGA  Comment:     Patient will tolerate standing for 2 minutes in prep for adls with SUP   Comment:    Patient will complete LB dressing with min assist   Comment:          Goals  on: 2/10/25  Frequency: 3x/week    Patient Evaluation Complexity Level:   Occupational Profile/Medical History MODERATE - Expanded review of history including review of medical or therapy record   Specific performance deficits impacting engagement in ADL/IADL MODERATE  3 - 5 performance deficits   Client Assessment/Performance Deficits MODERATE - Comorbidities and min to mod modifications of tasks    Clinical Decision Making MODERATE - Analysis of occupational profile, detailed assessments, several treatment options    Overall Complexity MODERATE     OT Session Time: 25 minutes  Self-Care Home Management: 25 minutes

## 2025-01-31 NOTE — PROGRESS NOTES
NEPHROLOGY DAILY PROGRESS NOTE       SUBJECTIVE:  Reports constipation and rectal pain      OBJECTIVE:    Total Intake/Output:    Intake/Output Summary (Last 24 hours) at 1/31/2025 1323  Last data filed at 1/31/2025 0957  Gross per 24 hour   Intake 480 ml   Output 3590 ml   Net -3110 ml           PHYSICAL EXAM:  /58 (BP Location: Right arm)   Pulse 116   Temp 98.2 °F (36.8 °C) (Oral)   Resp 18   Wt 141 lb (64 kg)   LMP  (LMP Unknown)   SpO2 95%   BMI 26.64 kg/m²   GEN:  NAD  HEENT: NCAT    CHEST: CTA b/l  CARDIAC: S1S2 normal  ABD: soft, NT/ND  : sky   EXT: no lower ext edema  NEURO: awake, alert    CURRENT MEDICATIONS:  Current Facility-Administered Medications   Medication Dose Route Frequency    ceFAZolin (Ancef) 2g in 10mL IV syringe premix  2 g Intravenous Q8H    metoprolol succinate ER (Toprol XL) 24 hr tab 50 mg  50 mg Oral Daily Beta Blocker    mycophenolate mofetil (Cellcept) cap 500 mg  500 mg Oral BID    sodium chloride 0.9 % irrigation 3,000 mL  3,000 mL Irrigation Continuous    acetaminophen (Tylenol) tab 650 mg  650 mg Oral Q6H PRN    atorvastatin (Lipitor) tab 40 mg  40 mg Oral Nightly    gabapentin (Neurontin) cap 300 mg  300 mg Oral Nightly    pancrelipase (Lip-Prot-Amyl) (Zenpep) DR particles cap 40,000 Units  40,000 Units Oral TID CC    pantoprazole (Protonix) DR tab 40 mg  40 mg Oral QAM    ondansetron (Zofran) 4 MG/2ML injection 4 mg  4 mg Intravenous Q6H PRN           LABS:  Patient Labs Reviewed in Detail. Pertinent Labs as follows:  Recent Labs   Lab 01/29/25  2030 01/30/25  0621 01/31/25  0634   * 90 92   BUN 41* 31* 15   CREATSERUM 1.27* 0.92 0.53*   EGFRCR 40* 58* 87   CA 9.0 8.9 8.7   * 135* 137   K 3.6 3.4* 3.6  3.6   CL 98 103 104   CO2 25.0 25.0 28.0     Recent Labs   Lab 01/29/25  1200 01/30/25  0621 01/31/25  0634   RBC 3.68* 3.74* 3.32*   HGB 11.2* 11.0* 10.1*   HCT 32.2* 33.4* 29.9*   MCV 87.5 89.3 90.1   MCH 30.4 29.4 30.4   MCHC 34.8 32.9 33.8   RDW  13.6 13.6 13.5   NEPRELIM 12.03* 6.05 4.86   WBC 13.8* 8.2 6.8   .0 257.0 243.0         IMAGING:  No results found.     ASSESSMENT AND PLAN:   This is a 89 year old female with PMH sig for HTN, HLD, myasthenia gravis, pancreatic insufficiency.  Presents with hematuria, urinary retention and moderate bilateral hydroureteronephrosis. Nephrology is consulted for LISANDRO.      LISANDRO   -presenting sCr of 1.66. sCr was 0.64 on 1/10/2025.  - suspect due to urinary retention, moderate bilateral hydroureteronephrosis   - resolved      Urinary retention  Hematuria   Moderate bilateral hydroureteronephrosis  - sky / CBI per Urology       Hyponatremia   - Na 129 on presentation   - suspect due to urinary retention, hypovolemia  - resolved   - stop hydrochlorothiazide      Hypokalemia   - replace per protocol       Nephrology will sign off. Thank you for the consult.     Candace Guaman MD  [facility] - Nephrology

## 2025-01-31 NOTE — PLAN OF CARE
Problem: PAIN - ADULT  Goal: Verbalizes/displays adequate comfort level or patient's stated pain goal  Description: INTERVENTIONS:  - Encourage pt to monitor pain and request assistance  - Assess pain using appropriate pain scale  - Administer analgesics based on type and severity of pain and evaluate response  - Implement non-pharmacological measures as appropriate and evaluate response  - Consider cultural and social influences on pain and pain management  - Manage/alleviate anxiety  - Utilize distraction and/or relaxation techniques  - Monitor for opioid side effects  - Notify MD/LIP if interventions unsuccessful or patient reports new pain  - Anticipate increased pain with activity and pre-medicate as appropriate  1/31/2025 0457 by Timmy Fowler RN  Outcome: Progressing  1/31/2025 0451 by Timmy Fowler RN  Outcome: Progressing     Problem: GENITOURINARY - ADULT  Goal: Absence of urinary retention  Description: INTERVENTIONS:  - Assess patient’s ability to void and empty bladder  - Monitor intake/output and perform bladder scan as needed  - Follow urinary retention protocol/standard of care  - Consider collaborating with pharmacy to review patient's medication profile  - Implement strategies to promote bladder emptying  1/31/2025 0457 by Timmy Fowler RN  Outcome: Progressing  1/31/2025 0451 by Timmy Fowler RN  Outcome: Progressing

## 2025-02-01 LAB
ANION GAP SERPL CALC-SCNC: 4 MMOL/L (ref 0–18)
BASOPHILS # BLD AUTO: 0.03 X10(3) UL (ref 0–0.2)
BASOPHILS NFR BLD AUTO: 0.4 %
BUN BLD-MCNC: 7 MG/DL (ref 9–23)
BUN/CREAT SERPL: 11.3 (ref 10–20)
CALCIUM BLD-MCNC: 8.2 MG/DL (ref 8.7–10.4)
CHLORIDE SERPL-SCNC: 103 MMOL/L (ref 98–112)
CO2 SERPL-SCNC: 30 MMOL/L (ref 21–32)
CREAT BLD-MCNC: 0.62 MG/DL
DEPRECATED RDW RBC AUTO: 45.1 FL (ref 35.1–46.3)
EGFRCR SERPLBLD CKD-EPI 2021: 83 ML/MIN/1.73M2 (ref 60–?)
EOSINOPHIL # BLD AUTO: 0.33 X10(3) UL (ref 0–0.7)
EOSINOPHIL NFR BLD AUTO: 3.9 %
ERYTHROCYTE [DISTWIDTH] IN BLOOD BY AUTOMATED COUNT: 13.7 % (ref 11–15)
GLUCOSE BLD-MCNC: 143 MG/DL (ref 70–99)
HCT VFR BLD AUTO: 30.4 %
HGB BLD-MCNC: 10.1 G/DL
IMM GRANULOCYTES # BLD AUTO: 0.05 X10(3) UL (ref 0–1)
IMM GRANULOCYTES NFR BLD: 0.6 %
LYMPHOCYTES # BLD AUTO: 0.73 X10(3) UL (ref 1–4)
LYMPHOCYTES NFR BLD AUTO: 8.7 %
MCH RBC QN AUTO: 29.9 PG (ref 26–34)
MCHC RBC AUTO-ENTMCNC: 33.2 G/DL (ref 31–37)
MCV RBC AUTO: 89.9 FL
MONOCYTES # BLD AUTO: 0.5 X10(3) UL (ref 0.1–1)
MONOCYTES NFR BLD AUTO: 6 %
NEUTROPHILS # BLD AUTO: 6.76 X10 (3) UL (ref 1.5–7.7)
NEUTROPHILS # BLD AUTO: 6.76 X10(3) UL (ref 1.5–7.7)
NEUTROPHILS NFR BLD AUTO: 80.4 %
OSMOLALITY SERPL CALC.SUM OF ELEC: 284 MOSM/KG (ref 275–295)
PLATELET # BLD AUTO: 239 10(3)UL (ref 150–450)
POTASSIUM SERPL-SCNC: 3.5 MMOL/L (ref 3.5–5.1)
RBC # BLD AUTO: 3.38 X10(6)UL
SARS-COV-2 RNA RESP QL NAA+PROBE: NOT DETECTED
SODIUM SERPL-SCNC: 137 MMOL/L (ref 136–145)
WBC # BLD AUTO: 8.4 X10(3) UL (ref 4–11)

## 2025-02-01 PROCEDURE — 80048 BASIC METABOLIC PNL TOTAL CA: CPT | Performed by: INTERNAL MEDICINE

## 2025-02-01 PROCEDURE — 85025 COMPLETE CBC W/AUTO DIFF WBC: CPT | Performed by: INTERNAL MEDICINE

## 2025-02-01 RX ORDER — MAGNESIUM HYDROXIDE 1200 MG/15ML
3000 LIQUID ORAL CONTINUOUS
Status: DISCONTINUED | OUTPATIENT
Start: 2025-02-01 | End: 2025-02-04

## 2025-02-01 NOTE — PLAN OF CARE
A/O x 4. CBI clamped this morning by urologist, urine pink/cherry in sky bag. +BM. Max assist to chair with lift. Tolerating diet. Denies pain. Ancef per orders. Plan to discharge to [facility] when cleared.

## 2025-02-01 NOTE — PHYSICAL THERAPY NOTE
PHYSICAL THERAPY EVALUATION - INPATIENT     Room Number: 459/459-A  Evaluation Date: 1/31/2025  Type of Evaluation: Initial   Physician Order: PT Eval and Treat    Presenting Problem: UTI w/hematuria, bilateral hydronephrosis and CBI/now with indwelling sky for bladder rest  Co-Morbidities : HTN, HLD, myasthenia gravis; recent norovirus and PNA after parainfluenza  Reason for Therapy: Mobility Dysfunction and Discharge Planning    PHYSICAL THERAPY ASSESSMENT   Patient is a 89 year old female admitted 1/29/2025 for hematuria, urinary retention, now with sky.  Prior to admission, patient's baseline is in rehab after norovirus and PNA from parainfluenza earlier this month. PRIOR to Carlos illness she lives at the [facility] living with her  and uses a rollator to get around. Patient is currently functioning below baseline with bed mobility, transfers, gait, maintaining seated position, standing prolonged periods, and performing household tasks.  Patient is requiring moderate assist and maximum assist as a result of the following impairments: decreased functional strength, decreased endurance/aerobic capacity, pain, impaired sitting and standing balance, decreased muscular endurance, and medical status.  Physical Therapy will continue to follow for duration of hospitalization.    Patient will benefit from continued skilled PT Services to promote return to prior level of function and safety with continuous assistance and gradual rehabilitative therapy .    PLAN DURING HOSPITALIZATION  Nursing Mobility Recommendation : 1 Assist (Lift equip if pt cannot stand)  PT Device Recommendation: Rolling walker;Wheelchair  PT Treatment Plan: Bed mobility;Endurance;Energy conservation;Patient education;Range of motion;Strengthening;Transfer training;Gait training  Rehab Potential : Good  Frequency (Obs): 3-5x/week     PHYSICAL THERAPY MEDICAL/SOCIAL HISTORY   History related to current admission: 89 year old  female with PMH Hypertension, Hyperlipidemia, Pancreatic insufficiency, Myasthenia Gravis, who presents from her nursing facility due to concern of possible hematuria/dark urine/and possible UTI. Of note patient was recently discharged from the hospital on 2025, during that admission she was treated for pneumonia, positive for parainfluenza, as well as norovirus.  During that admission the patient did not have any urinary symptoms or issues with retention.      Problem List  Principal Problem:    Hematuria, unspecified type  Active Problems:    Anemia    Acute renal failure (ARF) (HCC)    Acute kidney injury (HCC)    Acute cystitis with hematuria    Urinary retention      HOME SITUATION  Type of Home: Skilled nursing facility  Home Layout: One level                     Lives With: Staff 24 hours              Prior Level of Adrian: Patient recovering in rehab after acute medical illness. She reports she was walking  a bit and up to wc most of the day.     SUBJECTIVE  \"I just feel so SOB with minimal work!\"    PHYSICAL THERAPY EXAMINATION   OBJECTIVE  Precautions: Bed/chair alarm (Guevara)  Fall Risk: High fall risk    WEIGHT BEARING RESTRICTION       PAIN ASSESSMENT  Ratin  Location: pt report her bottom hurts  Management Techniques: Activity promotion;Repositioning (RN notified)    COGNITION  Overall Cognitive Status:  WFL - within functional limits    RANGE OF MOTION AND STRENGTH ASSESSMENT  Upper extremity ROM and strength are within functional limits   Lower extremity ROM is within functional limits   Lower extremity strength is within functional limits     BALANCE  Static Sitting: Fair +  Dynamic Sitting: Fair -  Static Standing: Poor +  Dynamic Standing: Poor -    ACTIVITY TOLERANCE  Pulse: (!) 128 (RN aware, reports restarting metoprolol)  Heart Rate Source: Monitor     BP: 125/54  BP Location: Right arm  BP Method: Automatic  Patient Position: Sitting    AM-PAC '6-Clicks' INPATIENT SHORT FORM -  BASIC MOBILITY  How much difficulty does the patient currently have...  Patient Difficulty: Turning over in bed (including adjusting bedclothes, sheets and blankets)?: A Lot   Patient Difficulty: Sitting down on and standing up from a chair with arms (e.g., wheelchair, bedside commode, etc.): A Little   Patient Difficulty: Moving from lying on back to sitting on the side of the bed?: A Lot   How much help from another person does the patient currently need...   Help from Another: Moving to and from a bed to a chair (including a wheelchair)?: A Lot   Help from Another: Need to walk in hospital room?: Total   Help from Another: Climbing 3-5 steps with a railing?: Total     AM-PAC Score:  Raw Score: 11   Approx Degree of Impairment: 72.57%   Standardized Score (AM-PAC Scale): 33.86   CMS Modifier (G-Code): CL    FUNCTIONAL ABILITY STATUS  Functional Mobility/Gait Assessment  Gait Assistance: Not tested (SPT only. pt unable today, feels SOB w/limited mobility, )  Rolling: minimal assist  Supine to Sit: moderate assist  Sit to Supine: moderate assist  Sit to Stand: minimal assist and moderate assist    Exercise/Education Provided:  Bed mobility  Energy conservation  Functional activity tolerated  Posture  ROM  Strengthening  Transfer training    Skilled Therapy Provided: RN approves participation in therapy session. Patient presents in bed and agreeable to work with therapy. She reports feeling SOB with slight exertion and HR noted to be in 120's with sitting EOB, standing, transfers. She needs several minutes to recover, RN is aware and reports metoprolol just restarted so this should help. Pt is reporting pain/soreness to her cecy-area due to recent norovirus and then UTI issues, cream is being used. Pt feels unsteady and is needing moderate to minimal assistance, not able to walk today. She is motivated and wants to get stronger so she can go home. She is up in chair with cushions to help with pressure relief,  alarm set and all needs in reach> RN is aware.    The patient's Approx Degree of Impairment: 72.57% has been calculated based on documentation in the [facility] '6 clicks' Inpatient Basic Mobility Short Form.  Research supports that patients with this level of impairment may benefit from inpatient rehab and GR/NEGRITA is the recommendation.  Final disposition will be made by interdisciplinary medical team.    Patient End of Session: Up in chair;Call light within reach;Needs met;RN aware of session/findings;All patient questions and concerns addressed;Hospital anti-slip socks;Alarm set;Discussed recommendations with /    CURRENT GOALS  Goals to be met by: 2/10/25  Patient Goal Patient's self-stated goal is: to get stronger and be able to go back home   Goal #1 Patient is able to demonstrate supine - sit EOB @ level: minimum assistance     Goal #1   Current Status    Goal #2 Patient is able to demonstrate transfers Sit to/from Stand at assistance level: minimum assistance with walker - rolling     Goal #2  Current Status    Goal #3 Patient is able to ambulate 10 feet with assist device: walker - rolling and and second person chair follow  at assistance level: moderate assistance   Goal #3   Current Status    Goal #4 Patient will negotiate transfers bed to/from chair with RW with moderate assistance   Goal #4   Current Status    Goal #5 Patient to demonstrate independence with home activity/exercise instructions provided to patient in preparation for discharge.   Goal #5   Current Status    Goal #6    Goal #6  Current Status      Patient Evaluation Complexity Level:  History High - 3 or more personal factors and/or co-morbidities   Examination of body systems Moderate - addressing a total of 3 or more elements   Clinical Presentation  Moderate - Evolving   Clinical Decision Making  Moderate Complexity     Therapeutic Activity:  31 minutes

## 2025-02-01 NOTE — PLAN OF CARE
Patient alert and oriented. Guevara, incontinent BM. Tele, no calls. Ancef. Denies nausea, pain minimal, relief with tylenol. Call light within reach, using appropriately, fall precautions in place.     Problem: PAIN - ADULT  Goal: Verbalizes/displays adequate comfort level or patient's stated pain goal  Description: INTERVENTIONS:  - Encourage pt to monitor pain and request assistance  - Assess pain using appropriate pain scale  - Administer analgesics based on type and severity of pain and evaluate response  - Implement non-pharmacological measures as appropriate and evaluate response  - Consider cultural and social influences on pain and pain management  - Manage/alleviate anxiety  - Utilize distraction and/or relaxation techniques  - Monitor for opioid side effects  - Notify MD/LIP if interventions unsuccessful or patient reports new pain  - Anticipate increased pain with activity and pre-medicate as appropriate  Outcome: Progressing     Problem: DISCHARGE PLANNING  Goal: Discharge to home or other facility with appropriate resources  Description: INTERVENTIONS:  - Identify barriers to discharge w/pt and caregiver  - Include patient/family/discharge partner in discharge planning  - Arrange for needed discharge resources and transportation as appropriate  - Identify discharge learning needs (meds, wound care, etc)  - Arrange for interpreters to assist at discharge as needed  - Consider post-discharge preferences of patient/family/discharge partner  - Complete POLST form as appropriate  - Assess patient's ability to be responsible for managing their own health  - Refer to Case Management Department for coordinating discharge planning if the patient needs post-hospital services based on physician/LIP order or complex needs related to functional status, cognitive ability or social support system  Outcome: Progressing     Problem: GENITOURINARY - ADULT  Goal: Absence of urinary retention  Description: INTERVENTIONS:  -  Assess patient’s ability to void and empty bladder  - Monitor intake/output and perform bladder scan as needed  - Follow urinary retention protocol/standard of care  - Consider collaborating with pharmacy to review patient's medication profile  - Implement strategies to promote bladder emptying  Outcome: Progressing     Problem: METABOLIC/FLUID AND ELECTROLYTES - ADULT  Goal: Hemodynamic stability and optimal renal function maintained  Description: INTERVENTIONS:  - Monitor labs and assess for signs and symptoms of volume excess or deficit  - Monitor intake, output and patient weight  - Monitor urine specific gravity, serum osmolarity and serum sodium as indicated or ordered  - Monitor response to interventions for patient's volume status, including labs, urine output, blood pressure (other measures as available)  - Encourage oral intake as appropriate  - Instruct patient on fluid and nutrition restrictions as appropriate  Outcome: Progressing     Problem: HEMATOLOGIC - ADULT  Goal: Maintains hematologic stability  Description: INTERVENTIONS  - Assess for signs and symptoms of bleeding or hemorrhage  - Monitor labs and vital signs for trends  - Administer supportive blood products/factors, fluids and medications as ordered and appropriate  - Administer supportive blood products/factors as ordered and appropriate  Outcome: Progressing

## 2025-02-01 NOTE — PROGRESS NOTES
[facility] Hospitalist Progress Note     CC: Hospital Follow up    PCP: None Pcp       Assessment/Plan:   89 year old female with PMH Hypertension, Hyperlipidemia, Pancreatic insufficiency, Myasthenia Gravis, who presents from her nursing facility due to concern of possible hematuria/dark urine/and possible UTI.      Hematuria  Urinary retention   Bilateral hydronephrosis   UTI  LISANDRO  -sky placed with CBI on admission. CBI was stopped on 1/31  -urine currently this AM dark red.   -urology on consult   -repeat labs this AM  -IV antibiotic started with rocephin IV---narrowed to IV ancef given sensitivity on 1/31   -addendum: updated urology. Will resume CBI today      LISANDRO-improved  Hyponatremia-improved  -stopped fluids on 1/30  -sky in place--remain in place for about a week   -appreciate renal/urology consult     Myasthenia gravis  -is on cellcept      Pancreatic insufficiency  -continue her creon      HTN  -hold hydrochlorothiazide, likely does not need at DC. With low sodium on admission would hold   -continueToprol 50mg daily      DVT prophylaxis: hold chemical prophy given hematuria   Code status: full code    Dispo: NEGRITA at discharge. Updated urology to see today given hematuria      Tejidner Riggins DO  [facility] Hospitalist      Subjective:     Vitals stable  Rectal pain from her hemorrhoid she states  No fever  No diarrhea   Urine red in sky bag    OBJECTIVE:    Blood pressure 111/45, pulse 85, temperature 97.3 °F (36.3 °C), temperature source Temporal, resp. rate 16, weight 141 lb (64 kg), SpO2 95%.    Temp:  [97.3 °F (36.3 °C)-98.2 °F (36.8 °C)] 97.3 °F (36.3 °C)  Pulse:  [] 85  Resp:  [16-18] 16  BP: (111-135)/(45-63) 111/45  SpO2:  [95 %-97 %] 95 %      Intake/Output:    Intake/Output Summary (Last 24 hours) at 2/1/2025 0977  Last data filed at 2/1/2025 0552  Gross per 24 hour   Intake 610 ml   Output 1150 ml   Net -540 ml       Last 3 Weights   01/29/25 1701 141 lb (64 kg)    01/29/25 1619 140 lb 4.8 oz (63.6 kg)   01/29/25 1156 136 lb 11 oz (62 kg)   01/03/25 2359 137 lb (62.1 kg)   01/01/25 1016 137 lb (62.1 kg)   11/01/24 0913 142 lb 6.4 oz (64.6 kg)       /45 (BP Location: Left arm)   Pulse 85   Temp 97.3 °F (36.3 °C) (Temporal)   Resp 16   Wt 141 lb (64 kg)   LMP  (LMP Unknown)   SpO2 95%   BMI 26.64 kg/m²   General: Alert, no acute distress  Lungs: clear to ausculation bilaterally  Heart: Regular rate and rhythm  Abdomen: soft, non tender  Extremities: No edema  : sky in place with red urine   Skin: no new rash, normal color    Data Review:       Labs:     Recent Labs   Lab 01/29/25  1200 01/30/25  0621 01/31/25  0634   RBC 3.68* 3.74* 3.32*   HGB 11.2* 11.0* 10.1*   HCT 32.2* 33.4* 29.9*   MCV 87.5 89.3 90.1   MCH 30.4 29.4 30.4   MCHC 34.8 32.9 33.8   RDW 13.6 13.6 13.5   NEPRELIM 12.03* 6.05 4.86   WBC 13.8* 8.2 6.8   .0 257.0 243.0         Recent Labs   Lab 01/29/25  2030 01/30/25  0621 01/31/25  0634   * 90 92   BUN 41* 31* 15   CREATSERUM 1.27* 0.92 0.53*   EGFRCR 40* 58* 87   CA 9.0 8.9 8.7   * 135* 137   K 3.6 3.4* 3.6  3.6   CL 98 103 104   CO2 25.0 25.0 28.0       Recent Labs   Lab 01/29/25  1200   ALT 17   AST 22   ALB 3.5         Imaging:  CT ABDOMEN+PELVIS KIDNEYSTONE 2D RNDR(NO IV,NO ORAL)(CPT=74176)    Result Date: 1/29/2025  CONCLUSION:  1.  Prominent distention urinary bladder containing 1300 mL of urine.  Correlate for voluntary versus involuntary urinary retention.  Moderate bilateral hydroureteronephrosis extending from the renal calices to the urinary bladder which may represent changes of vascular ureteral reflux/central obstruction. 2.  Colonic diverticulosis. 3.  Coronary atherosclerosis. 4.  Post cholecystectomy.  No significant biliary ductal dilatation. 5.  Scattered large duodenal diverticula.    Dictated by (CST): Tone Saldana MD on 1/29/2025 at 1:32 PM     Finalized by (CST): Tone Saldana MD on 1/29/2025 at 1:37  PM             Meds:      ceFAZolin  2 g Intravenous Q8H    metoprolol succinate ER  50 mg Oral Daily Beta Blocker    mycophenolate mofetil  500 mg Oral BID    atorvastatin  40 mg Oral Nightly    gabapentin  300 mg Oral Nightly    lipase-protease-amylase (Lip-Prot-Amyl)  40,000 Units Oral TID CC    pantoprazole  40 mg Oral QAM      sodium chloride         phenylephrine-min oil-brittany    acetaminophen    ondansetron

## 2025-02-02 NOTE — CM/SW NOTE
DC order present.     Notified [facility], awaiting confirmation on bed.     1153am  Discussed case with RN. Medical clear still pending. Per Shari godoy. No bed available today at [facility].  notified RN.     Erica Zapata, MSW, LSW

## 2025-02-02 NOTE — PLAN OF CARE
Appetite getting better, restarted cbi/slow rate, iv abx, repositioned, incontinent stool, pain due to hemorrhoids, plan for discharge to [facility].  Patient updated on plan of care.    Problem: Patient Centered Care  Goal: Patient preferences are identified and integrated in the patient's plan of care  Description: Interventions:  - What would you like us to know as we care for you? -  - Provide timely, complete, and accurate information to patient/family  - Incorporate patient and family knowledge, values, beliefs, and cultural backgrounds into the planning and delivery of care  - Encourage patient/family to participate in care and decision-making at the level they choose  - Honor patient and family perspectives and choices  Outcome: Progressing     Problem: PAIN - ADULT  Goal: Verbalizes/displays adequate comfort level or patient's stated pain goal  Description: INTERVENTIONS:  - Encourage pt to monitor pain and request assistance  - Assess pain using appropriate pain scale  - Administer analgesics based on type and severity of pain and evaluate response  - Implement non-pharmacological measures as appropriate and evaluate response  - Consider cultural and social influences on pain and pain management  - Manage/alleviate anxiety  - Utilize distraction and/or relaxation techniques  - Monitor for opioid side effects  - Notify MD/LIP if interventions unsuccessful or patient reports new pain  - Anticipate increased pain with activity and pre-medicate as appropriate  Outcome: Progressing     Problem: DISCHARGE PLANNING  Goal: Discharge to home or other facility with appropriate resources  Description: INTERVENTIONS:  - Identify barriers to discharge w/pt and caregiver  - Include patient/family/discharge partner in discharge planning  - Arrange for needed discharge resources and transportation as appropriate  - Identify discharge learning needs (meds, wound care, etc)  - Arrange for interpreters to assist at discharge  as needed  - Consider post-discharge preferences of patient/family/discharge partner  - Complete POLST form as appropriate  - Assess patient's ability to be responsible for managing their own health  - Refer to Case Management Department for coordinating discharge planning if the patient needs post-hospital services based on physician/LIP order or complex needs related to functional status, cognitive ability or social support system  Outcome: Progressing     Problem: GENITOURINARY - ADULT  Goal: Absence of urinary retention  Description: INTERVENTIONS:  - Assess patient’s ability to void and empty bladder  - Monitor intake/output and perform bladder scan as needed  - Follow urinary retention protocol/standard of care  - Consider collaborating with pharmacy to review patient's medication profile  - Implement strategies to promote bladder emptying  Outcome: Progressing     Problem: METABOLIC/FLUID AND ELECTROLYTES - ADULT  Goal: Hemodynamic stability and optimal renal function maintained  Description: INTERVENTIONS:  - Monitor labs and assess for signs and symptoms of volume excess or deficit  - Monitor intake, output and patient weight  - Monitor urine specific gravity, serum osmolarity and serum sodium as indicated or ordered  - Monitor response to interventions for patient's volume status, including labs, urine output, blood pressure (other measures as available)  - Encourage oral intake as appropriate  - Instruct patient on fluid and nutrition restrictions as appropriate  Outcome: Progressing     Problem: HEMATOLOGIC - ADULT  Goal: Maintains hematologic stability  Description: INTERVENTIONS  - Assess for signs and symptoms of bleeding or hemorrhage  - Monitor labs and vital signs for trends  - Administer supportive blood products/factors, fluids and medications as ordered and appropriate  - Administer supportive blood products/factors as ordered and appropriate  Outcome: Progressing

## 2025-02-02 NOTE — PLAN OF CARE
Patient alert and oriented. CBI, slow, irrigated once with clots, otherwise pink tinged to clear. Incontinent BM. Tele, no calls. Ancef. Pain minimal, relief with tylenol. Episode of nausea & stomach discomfort, declined intervention, spontaneous relief. Call light within reach, using appropriately, fall precautions in place.     Problem: PAIN - ADULT  Goal: Verbalizes/displays adequate comfort level or patient's stated pain goal  Description: INTERVENTIONS:  - Encourage pt to monitor pain and request assistance  - Assess pain using appropriate pain scale  - Administer analgesics based on type and severity of pain and evaluate response  - Implement non-pharmacological measures as appropriate and evaluate response  - Consider cultural and social influences on pain and pain management  - Manage/alleviate anxiety  - Utilize distraction and/or relaxation techniques  - Monitor for opioid side effects  - Notify MD/LIP if interventions unsuccessful or patient reports new pain  - Anticipate increased pain with activity and pre-medicate as appropriate  Outcome: Progressing     Problem: DISCHARGE PLANNING  Goal: Discharge to home or other facility with appropriate resources  Description: INTERVENTIONS:  - Identify barriers to discharge w/pt and caregiver  - Include patient/family/discharge partner in discharge planning  - Arrange for needed discharge resources and transportation as appropriate  - Identify discharge learning needs (meds, wound care, etc)  - Arrange for interpreters to assist at discharge as needed  - Consider post-discharge preferences of patient/family/discharge partner  - Complete POLST form as appropriate  - Assess patient's ability to be responsible for managing their own health  - Refer to Case Management Department for coordinating discharge planning if the patient needs post-hospital services based on physician/LIP order or complex needs related to functional status, cognitive ability or social support  system  Outcome: Progressing     Problem: GENITOURINARY - ADULT  Goal: Absence of urinary retention  Description: INTERVENTIONS:  - Assess patient’s ability to void and empty bladder  - Monitor intake/output and perform bladder scan as needed  - Follow urinary retention protocol/standard of care  - Consider collaborating with pharmacy to review patient's medication profile  - Implement strategies to promote bladder emptying  Outcome: Progressing     Problem: METABOLIC/FLUID AND ELECTROLYTES - ADULT  Goal: Hemodynamic stability and optimal renal function maintained  Description: INTERVENTIONS:  - Monitor labs and assess for signs and symptoms of volume excess or deficit  - Monitor intake, output and patient weight  - Monitor urine specific gravity, serum osmolarity and serum sodium as indicated or ordered  - Monitor response to interventions for patient's volume status, including labs, urine output, blood pressure (other measures as available)  - Encourage oral intake as appropriate  - Instruct patient on fluid and nutrition restrictions as appropriate  Outcome: Progressing     Problem: HEMATOLOGIC - ADULT  Goal: Maintains hematologic stability  Description: INTERVENTIONS  - Assess for signs and symptoms of bleeding or hemorrhage  - Monitor labs and vital signs for trends  - Administer supportive blood products/factors, fluids and medications as ordered and appropriate  - Administer supportive blood products/factors as ordered and appropriate  Outcome: Progressing

## 2025-02-02 NOTE — PLAN OF CARE
Fair appetite, CBI clamped, pink tinged/clear urine, prn rectal ointment for pain with bm, tylenol given, continue ancef, repositioned.  Awaiting bed at [facility] for discharge.    Problem: Patient Centered Care  Goal: Patient preferences are identified and integrated in the patient's plan of care  Description: Interventions:  - What would you like us to know as we care for you? -  - Provide timely, complete, and accurate information to patient/family  - Incorporate patient and family knowledge, values, beliefs, and cultural backgrounds into the planning and delivery of care  - Encourage patient/family to participate in care and decision-making at the level they choose  - Honor patient and family perspectives and choices  Outcome: Progressing     Problem: PAIN - ADULT  Goal: Verbalizes/displays adequate comfort level or patient's stated pain goal  Description: INTERVENTIONS:  - Encourage pt to monitor pain and request assistance  - Assess pain using appropriate pain scale  - Administer analgesics based on type and severity of pain and evaluate response  - Implement non-pharmacological measures as appropriate and evaluate response  - Consider cultural and social influences on pain and pain management  - Manage/alleviate anxiety  - Utilize distraction and/or relaxation techniques  - Monitor for opioid side effects  - Notify MD/LIP if interventions unsuccessful or patient reports new pain  - Anticipate increased pain with activity and pre-medicate as appropriate  Outcome: Progressing     Problem: DISCHARGE PLANNING  Goal: Discharge to home or other facility with appropriate resources  Description: INTERVENTIONS:  - Identify barriers to discharge w/pt and caregiver  - Include patient/family/discharge partner in discharge planning  - Arrange for needed discharge resources and transportation as appropriate  - Identify discharge learning needs (meds, wound care, etc)  - Arrange for interpreters to assist at discharge as  needed  - Consider post-discharge preferences of patient/family/discharge partner  - Complete POLST form as appropriate  - Assess patient's ability to be responsible for managing their own health  - Refer to Case Management Department for coordinating discharge planning if the patient needs post-hospital services based on physician/LIP order or complex needs related to functional status, cognitive ability or social support system  Outcome: Progressing     Problem: GENITOURINARY - ADULT  Goal: Absence of urinary retention  Description: INTERVENTIONS:  - Assess patient’s ability to void and empty bladder  - Monitor intake/output and perform bladder scan as needed  - Follow urinary retention protocol/standard of care  - Consider collaborating with pharmacy to review patient's medication profile  - Implement strategies to promote bladder emptying  Outcome: Progressing     Problem: METABOLIC/FLUID AND ELECTROLYTES - ADULT  Goal: Hemodynamic stability and optimal renal function maintained  Description: INTERVENTIONS:  - Monitor labs and assess for signs and symptoms of volume excess or deficit  - Monitor intake, output and patient weight  - Monitor urine specific gravity, serum osmolarity and serum sodium as indicated or ordered  - Monitor response to interventions for patient's volume status, including labs, urine output, blood pressure (other measures as available)  - Encourage oral intake as appropriate  - Instruct patient on fluid and nutrition restrictions as appropriate  Outcome: Progressing     Problem: HEMATOLOGIC - ADULT  Goal: Maintains hematologic stability  Description: INTERVENTIONS  - Assess for signs and symptoms of bleeding or hemorrhage  - Monitor labs and vital signs for trends  - Administer supportive blood products/factors, fluids and medications as ordered and appropriate  - Administer supportive blood products/factors as ordered and appropriate  Outcome: Progressing

## 2025-02-02 NOTE — PROGRESS NOTES
[facility]  part of [facility]    Progress Note    Sharda Rai Patient Status:  Inpatient    1932 MRN [id number]   Location [facility] 4W/SW/SE Attending Tejinder Riggins, DO   Hosp Day # 3 PCP None Pcp     Subjective:   Sharda Rai is a(n) 89 year old female with UR 1300ml, UTI and gross hematuria. Urine cleared yesterday but bright red this morning.  CBI restarted this morning after which hematuria resolved fairly quickly.    Objective:   Blood pressure 115/49, pulse 87, temperature 98 °F (36.7 °C), temperature source Oral, resp. rate 18, weight 141 lb (64 kg), SpO2 95%.    GENERAL: well developed, well nourished, in no apparent distress  HEENT: atraumatic, normocephalic  LUNGS: normal respiratory motion without distress  CARDIO:NA  Abd: Soft, non-tender, non-distended  : No SPT or CVAT, 22F 3 way in place with faintly pink tinged urine on very slow CBI drip    Results:   Lab Results   Component Value Date    WBC 8.4 2025    HGB 10.1 (L) 2025    HCT 30.4 (L) 2025    .0 2025    CREATSERUM 0.62 2025    BUN 7 (L) 2025     2025    K 3.5 2025     2025    CO2 30.0 2025     (H) 2025    CA 8.2 (L) 2025    ALB 3.5 2025    ALKPHO 97 2025    BILT 0.7 2025    TP 6.0 2025    AST 22 2025    ALT 17 2025    TSH 3.27 2018    LIP 24 10/18/2024    ESRML 27 10/10/2019    CRP <0.29 10/10/2019    MG 1.8 01/10/2025    TROPHS 8 2025       No results found.        Assessment & Plan:     89yo F with UTI, UR 1300ml causing b/l hydroureteronephrosis and gross hematuria    Plan:  -Gross hem likely 2/2 overdistention and UTI. Hematuria recurred this morning requiring restart of CBI.  Will wean off today and monitor  -Will need catheter for about a week for bladder rest as patient currently also immobile and will likely fail voiding trial  -Cr improved since  catheter placed no need for f/u US    Sally Navarro MD  Urology  Covering for [facility] this weekend.  [facility] team will resume care on 2/3

## 2025-02-02 NOTE — PROGRESS NOTES
[facility] Hospitalist Progress Note     CC: Hospital Follow up    PCP: None Pcp       Assessment/Plan:   89 year old female with PMH Hypertension, Hyperlipidemia, Pancreatic insufficiency, Myasthenia Gravis, who presents from her nursing facility due to concern of possible hematuria/dark urine/and possible UTI.      Hematuria  Urinary retention   Bilateral hydronephrosis   UTI  LISANDRO  -sky placed with CBI on admission. CBI was stopped on 1/31---started on 2/1 with now clear urine  -wean and clamp CBI  -urine clear this AM   -discharge with sky for about a week before void trial   -appreciate urology consult  -IV antibiotic started with rocephin IV---narrowed to IV ancef given sensitivity on 1/31      LISANDRO-improved  Hyponatremia-improved  -stopped fluids on 1/30  -sky in place--remain in place for about a week   -appreciate renal/urology consult     Myasthenia gravis  -is on cellcept      Pancreatic insufficiency  -continue her creon      HTN  -hold hydrochlorothiazide, likely does not need at DC. With low sodium on admission would hold   -continueToprol 50mg daily      DVT prophylaxis: hold chemical prophy given hematuria   Code status: full code    Dispo: [facility] at discharge. No bed today, hopefully tomorrow      Asrar Gilson BOSS  [facility] Hospitalist      Subjective:     Vitals stable  Urine now clear yellow     OBJECTIVE:    Blood pressure 100/51, pulse 87, temperature 98.1 °F (36.7 °C), temperature source Oral, resp. rate 18, weight 141 lb (64 kg), SpO2 93%.    Temp:  [97.4 °F (36.3 °C)-98.4 °F (36.9 °C)] 98.1 °F (36.7 °C)  Pulse:  [87-95] 87  Resp:  [18] 18  BP: (100-126)/(51-66) 100/51  SpO2:  [93 %-98 %] 93 %      Intake/Output:    Intake/Output Summary (Last 24 hours) at 2/2/2025 1202  Last data filed at 2/2/2025 0700  Gross per 24 hour   Intake 20 ml   Output 700 ml   Net -680 ml       Last 3 Weights   01/29/25 1701 141 lb (64 kg)   01/29/25 1619 140 lb 4.8 oz (63.6 kg)   01/29/25 1156  136 lb 11 oz (62 kg)   01/03/25 2359 137 lb (62.1 kg)   01/01/25 1016 137 lb (62.1 kg)   11/01/24 0913 142 lb 6.4 oz (64.6 kg)       /51 (BP Location: Right arm)   Pulse 87   Temp 98.1 °F (36.7 °C) (Oral)   Resp 18   Wt 141 lb (64 kg)   LMP  (LMP Unknown)   SpO2 93%   BMI 26.64 kg/m²   General: Alert, no acute distress  Lungs: clear to ausculation bilaterally  Heart: Regular rate and rhythm  Abdomen: soft, non tender  Extremities: No edema  : sky in place with yellow urine   Skin: no new rash, normal color    Data Review:       Labs:     Recent Labs   Lab 01/30/25  0621 01/31/25  0634 02/01/25  0956   RBC 3.74* 3.32* 3.38*   HGB 11.0* 10.1* 10.1*   HCT 33.4* 29.9* 30.4*   MCV 89.3 90.1 89.9   MCH 29.4 30.4 29.9   MCHC 32.9 33.8 33.2   RDW 13.6 13.5 13.7   NEPRELIM 6.05 4.86 6.76   WBC 8.2 6.8 8.4   .0 243.0 239.0         Recent Labs   Lab 01/30/25  0621 01/31/25  0634 02/01/25  0956   GLU 90 92 143*   BUN 31* 15 7*   CREATSERUM 0.92 0.53* 0.62   EGFRCR 58* 87 83   CA 8.9 8.7 8.2*   * 137 137   K 3.4* 3.6  3.6 3.5    104 103   CO2 25.0 28.0 30.0       Recent Labs   Lab 01/29/25  1200   ALT 17   AST 22   ALB 3.5         Imaging:  No results found.      Meds:      ceFAZolin  2 g Intravenous Q8H    metoprolol succinate ER  50 mg Oral Daily Beta Blocker    mycophenolate mofetil  500 mg Oral BID    atorvastatin  40 mg Oral Nightly    gabapentin  300 mg Oral Nightly    lipase-protease-amylase (Lip-Prot-Amyl)  40,000 Units Oral TID CC    pantoprazole  40 mg Oral QAM      sodium chloride         phenylephrine-min oil-brittany    acetaminophen    ondansetron

## 2025-02-02 NOTE — PROGRESS NOTES
[facility]  part of [facility]    Progress Note    Sharda Rai Patient Status:  Inpatient    1932 MRN [id number]   Location [facility] 4W/SW/SE Attending Tejinder Riggins, DO   Hosp Day # 4 PCP None Pcp     Subjective:   Sharda Rai is a(n) 89 year old female with UR 1300ml, UTI and gross hematuria. Required irrigation for a clot overnight.  CBI running very slow right now.    Objective:   Blood pressure 100/51, pulse 87, temperature 98.1 °F (36.7 °C), temperature source Oral, resp. rate 18, weight 141 lb (64 kg), SpO2 93%.    GENERAL: well developed, well nourished, in no apparent distress  HEENT: atraumatic, normocephalic  LUNGS: normal respiratory motion without distress  CARDIO:NA  Abd: Soft, non-tender, non-distended  : No SPT or CVAT, 22F 3 way in place with faintly pink tinged urine on very slow CBI drip    Results:   Lab Results   Component Value Date    WBC 8.4 2025    HGB 10.1 (L) 2025    HCT 30.4 (L) 2025    .0 2025    CREATSERUM 0.62 2025    BUN 7 (L) 2025     2025    K 3.5 2025     2025    CO2 30.0 2025     (H) 2025    CA 8.2 (L) 2025    ALB 3.5 2025    ALKPHO 97 2025    BILT 0.7 2025    TP 6.0 2025    AST 22 2025    ALT 17 2025    TSH 3.27 2018    LIP 24 10/18/2024    ESRML 27 10/10/2019    CRP <0.29 10/10/2019    MG 1.8 01/10/2025    TROPHS 8 2025       No results found.        Assessment & Plan:     89yo F with UTI, UR 1300ml causing b/l hydroureteronephrosis and gross hematuria    Plan:  -Gross hem likely 2/2 overdistention and UTI.   - Hematuria is very mild at this point.  Monitor off CBI  - Will need catheter for about a week for bladder rest as patient currently also immobile and will likely fail voiding trial  -Cr improved since catheter placed no need for f/u US    Sally Navarro MD  Urology  Covering for  [facility] this weekend.  [facility] team will resume care on 2/3

## 2025-02-03 LAB
ANION GAP SERPL CALC-SCNC: 6 MMOL/L (ref 0–18)
BASOPHILS # BLD AUTO: 0.02 X10(3) UL (ref 0–0.2)
BASOPHILS NFR BLD AUTO: 0.2 %
BUN BLD-MCNC: 8 MG/DL (ref 9–23)
BUN/CREAT SERPL: 13.6 (ref 10–20)
CALCIUM BLD-MCNC: 8 MG/DL (ref 8.7–10.4)
CHLORIDE SERPL-SCNC: 97 MMOL/L (ref 98–112)
CO2 SERPL-SCNC: 32 MMOL/L (ref 21–32)
CREAT BLD-MCNC: 0.59 MG/DL
DEPRECATED RDW RBC AUTO: 44.5 FL (ref 35.1–46.3)
EGFRCR SERPLBLD CKD-EPI 2021: 85 ML/MIN/1.73M2 (ref 60–?)
EOSINOPHIL # BLD AUTO: 0.17 X10(3) UL (ref 0–0.7)
EOSINOPHIL NFR BLD AUTO: 2 %
ERYTHROCYTE [DISTWIDTH] IN BLOOD BY AUTOMATED COUNT: 13.5 % (ref 11–15)
GLUCOSE BLD-MCNC: 139 MG/DL (ref 70–99)
HCT VFR BLD AUTO: 31.7 %
HGB BLD-MCNC: 10.9 G/DL
IMM GRANULOCYTES # BLD AUTO: 0.04 X10(3) UL (ref 0–1)
IMM GRANULOCYTES NFR BLD: 0.5 %
LYMPHOCYTES # BLD AUTO: 0.89 X10(3) UL (ref 1–4)
LYMPHOCYTES NFR BLD AUTO: 10.7 %
MCH RBC QN AUTO: 30.8 PG (ref 26–34)
MCHC RBC AUTO-ENTMCNC: 34.4 G/DL (ref 31–37)
MCV RBC AUTO: 89.5 FL
MONOCYTES # BLD AUTO: 0.52 X10(3) UL (ref 0.1–1)
MONOCYTES NFR BLD AUTO: 6.2 %
NEUTROPHILS # BLD AUTO: 6.7 X10 (3) UL (ref 1.5–7.7)
NEUTROPHILS # BLD AUTO: 6.7 X10(3) UL (ref 1.5–7.7)
NEUTROPHILS NFR BLD AUTO: 80.4 %
OSMOLALITY SERPL CALC.SUM OF ELEC: 281 MOSM/KG (ref 275–295)
PLATELET # BLD AUTO: 271 10(3)UL (ref 150–450)
POTASSIUM SERPL-SCNC: 3.5 MMOL/L (ref 3.5–5.1)
RBC # BLD AUTO: 3.54 X10(6)UL
SODIUM SERPL-SCNC: 135 MMOL/L (ref 136–145)
WBC # BLD AUTO: 8.3 X10(3) UL (ref 4–11)

## 2025-02-03 PROCEDURE — 80048 BASIC METABOLIC PNL TOTAL CA: CPT | Performed by: INTERNAL MEDICINE

## 2025-02-03 PROCEDURE — 85025 COMPLETE CBC W/AUTO DIFF WBC: CPT | Performed by: INTERNAL MEDICINE

## 2025-02-03 RX ORDER — CEFUROXIME AXETIL 500 MG/1
500 TABLET ORAL 2 TIMES DAILY
Qty: 6 TABLET | Refills: 0 | Status: SHIPPED | OUTPATIENT
Start: 2025-02-04 | End: 2025-02-07

## 2025-02-03 NOTE — CONGREGATE LIVING REVIEW
[facility] Authorization    The [facility] Review Committee has reviewed this case and the patient IS APPROVED for discharge to a facility for Short Term Skilled once the following procedure is followed:     - The physician discharge instructions (contained within the SAL note for SNF) must inlcude the below appropriate and approved COVID instructions to the facility    For questions regarding CLRC approval process, please contact the CM assigned to the case.  For questions regarding RN discharge workflow, please contact the unit Clinical Leader.

## 2025-02-03 NOTE — PROGRESS NOTES
[facility] Hospitalist Progress Note     CC: Hospital Follow up    PCP: None Pcp       Assessment/Plan:   89 year old female with PMH Hypertension, Hyperlipidemia, Pancreatic insufficiency, Myasthenia Gravis, who presents from her nursing facility due to concern of possible hematuria/dark urine/and possible UTI.      Hematuria  Urinary retention   Bilateral hydronephrosis   UTI  LISANDRO  -sky placed with CBI on admission. CBI was stopped on 1/31---started on 2/1---them clamped 2/2, then had hematuria again with clots still   -discharge with sky for about a week before void trial   -continue irrigation today until eval from urology   -IV antibiotic started with rocephin IV---narrowed to IV ancef given sensitivity on 1/31      LISANDRO-improved  Hyponatremia-improved  -stopped fluids on 1/30  -sky in place--remain in place for about a week   -appreciate renal/urology consult     Myasthenia gravis  -is on cellcept      Pancreatic insufficiency  -continue her creon      HTN  -hold hydrochlorothiazide, likely does not need at DC. With low sodium on admission would hold   -continueToprol 50mg daily      DVT prophylaxis: hold chemical prophy given hematuria   Code status: full code    Dispo: [facility] at discharge. Multiple clots last night, CBI was restarted. Still running, probably can wean today and clamp. Let urology see. Will have to hold discharge today      Tejinder Feliciano [facility] Hospitalist      Subjective:     Vitals stable  Urine looks better and more clear, but when cbi was stopped yesterday and multiple clots irrigated     OBJECTIVE:    Blood pressure 107/50, pulse 96, temperature 98 °F (36.7 °C), temperature source Oral, resp. rate 18, weight 141 lb (64 kg), SpO2 93%.    Temp:  [98 °F (36.7 °C)-98.4 °F (36.9 °C)] 98 °F (36.7 °C)  Pulse:  [86-96] 96  Resp:  [18] 18  BP: (107-118)/(50-61) 107/50  SpO2:  [91 %-94 %] 93 %      Intake/Output:    Intake/Output Summary (Last 24 hours) at 2/3/2025  1251  Last data filed at 2/3/2025 0700  Gross per 24 hour   Intake 20 ml   Output 1020 ml   Net -1000 ml       Last 3 Weights   01/29/25 1701 141 lb (64 kg)   01/29/25 1619 140 lb 4.8 oz (63.6 kg)   01/29/25 1156 136 lb 11 oz (62 kg)   01/03/25 2359 137 lb (62.1 kg)   01/01/25 1016 137 lb (62.1 kg)   11/01/24 0913 142 lb 6.4 oz (64.6 kg)       /50 (BP Location: Right arm)   Pulse 96   Temp 98 °F (36.7 °C) (Oral)   Resp 18   Wt 141 lb (64 kg)   LMP  (LMP Unknown)   SpO2 93%   BMI 26.64 kg/m²   General: Alert, no acute distress  Lungs: clear to ausculation bilaterally  Heart: Regular rate and rhythm  Abdomen: soft, non tender  Extremities: No edema  : sky in place with yellow urine   Skin: no new rash, normal color    Data Review:       Labs:     Recent Labs   Lab 01/31/25  0634 02/01/25  0956 02/03/25  0929   RBC 3.32* 3.38* 3.54*   HGB 10.1* 10.1* 10.9*   HCT 29.9* 30.4* 31.7*   MCV 90.1 89.9 89.5   MCH 30.4 29.9 30.8   MCHC 33.8 33.2 34.4   RDW 13.5 13.7 13.5   NEPRELIM 4.86 6.76 6.70   WBC 6.8 8.4 8.3   .0 239.0 271.0         Recent Labs   Lab 01/31/25  0634 02/01/25  0956 02/03/25  0929   GLU 92 143* 139*   BUN 15 7* 8*   CREATSERUM 0.53* 0.62 0.59   EGFRCR 87 83 85   CA 8.7 8.2* 8.0*    137 135*   K 3.6  3.6 3.5 3.5    103 97*   CO2 28.0 30.0 32.0       Recent Labs   Lab 01/29/25  1200   ALT 17   AST 22   ALB 3.5         Imaging:  No results found.      Meds:      ceFAZolin  2 g Intravenous Q8H    metoprolol succinate ER  50 mg Oral Daily Beta Blocker    mycophenolate mofetil  500 mg Oral BID    atorvastatin  40 mg Oral Nightly    gabapentin  300 mg Oral Nightly    lipase-protease-amylase (Lip-Prot-Amyl)  40,000 Units Oral TID CC    pantoprazole  40 mg Oral QAM      sodium chloride         phenylephrine-min oil-brittany    acetaminophen    ondansetron

## 2025-02-03 NOTE — PROGRESS NOTES
[facility]  part of [facility]    Progress Note    Sharda Rai Patient Status:  Inpatient    1932 MRN [id number]   Location [facility] 4W/SW/SE Attending Tejinder Riggins, DO   Hosp Day # 5 PCP None Pcp     Subjective:   Sharda Rai is a(n) 89 year old female   Urinary retention - 1300ml  Cbi off  Urine is clear    Objective:   Blood pressure 125/65, pulse 92, temperature 98 °F (36.7 °C), temperature source Oral, resp. rate 18, weight 141 lb (64 kg), SpO2 94%.    Awake alert  Urine clear in the bag  Cbi off    Abd soft, NT    Results:   Lab Results   Component Value Date    WBC 8.3 2025    HGB 10.9 (L) 2025    HCT 31.7 (L) 2025    .0 2025    CREATSERUM 0.59 2025    BUN 8 (L) 2025     (L) 2025    K 3.5 2025    CL 97 (L) 2025    CO2 32.0 2025     (H) 2025    CA 8.0 (L) 2025    ALB 3.5 2025    ALKPHO 97 2025    BILT 0.7 2025    TP 6.0 2025    AST 22 2025    ALT 17 2025    TSH 3.27 2018    LIP 24 10/18/2024    ESRML 27 10/10/2019    CRP <0.29 10/10/2019    MG 1.8 01/10/2025    TROPHS 8 2025       No results found.        Assessment & Plan:     Hematuria, unspecified type  Due to retention  Clear now  Continue catheter for now  Plan for removal of catheter when she is stronger      Urinary retention  Unclear cause  Continue catheter  Voiding trial when she is stronger  Needs case management for care  She is going to rehab  Discussed with patient      Adonay Michaud MD  2/3/2025

## 2025-02-03 NOTE — PLAN OF CARE
Patient alert and oriented. Guevara, urine cherry red, irrigated multiple times with clots, urology notified, CBI restarted. Incontinent BM. Ancef. Pain minimal, relief with tylenol. Call light within reach, using appropriately, fall precautions in place.     Problem: PAIN - ADULT  Goal: Verbalizes/displays adequate comfort level or patient's stated pain goal  Description: INTERVENTIONS:  - Encourage pt to monitor pain and request assistance  - Assess pain using appropriate pain scale  - Administer analgesics based on type and severity of pain and evaluate response  - Implement non-pharmacological measures as appropriate and evaluate response  - Consider cultural and social influences on pain and pain management  - Manage/alleviate anxiety  - Utilize distraction and/or relaxation techniques  - Monitor for opioid side effects  - Notify MD/LIP if interventions unsuccessful or patient reports new pain  - Anticipate increased pain with activity and pre-medicate as appropriate  Outcome: Progressing     Problem: DISCHARGE PLANNING  Goal: Discharge to home or other facility with appropriate resources  Description: INTERVENTIONS:  - Identify barriers to discharge w/pt and caregiver  - Include patient/family/discharge partner in discharge planning  - Arrange for needed discharge resources and transportation as appropriate  - Identify discharge learning needs (meds, wound care, etc)  - Arrange for interpreters to assist at discharge as needed  - Consider post-discharge preferences of patient/family/discharge partner  - Complete POLST form as appropriate  - Assess patient's ability to be responsible for managing their own health  - Refer to Case Management Department for coordinating discharge planning if the patient needs post-hospital services based on physician/LIP order or complex needs related to functional status, cognitive ability or social support system  Outcome: Progressing     Problem: GENITOURINARY - ADULT  Goal:  Absence of urinary retention  Description: INTERVENTIONS:  - Assess patient’s ability to void and empty bladder  - Monitor intake/output and perform bladder scan as needed  - Follow urinary retention protocol/standard of care  - Consider collaborating with pharmacy to review patient's medication profile  - Implement strategies to promote bladder emptying  Outcome: Progressing     Problem: METABOLIC/FLUID AND ELECTROLYTES - ADULT  Goal: Hemodynamic stability and optimal renal function maintained  Description: INTERVENTIONS:  - Monitor labs and assess for signs and symptoms of volume excess or deficit  - Monitor intake, output and patient weight  - Monitor urine specific gravity, serum osmolarity and serum sodium as indicated or ordered  - Monitor response to interventions for patient's volume status, including labs, urine output, blood pressure (other measures as available)  - Encourage oral intake as appropriate  - Instruct patient on fluid and nutrition restrictions as appropriate  Outcome: Progressing     Problem: HEMATOLOGIC - ADULT  Goal: Maintains hematologic stability  Description: INTERVENTIONS  - Assess for signs and symptoms of bleeding or hemorrhage  - Monitor labs and vital signs for trends  - Administer supportive blood products/factors, fluids and medications as ordered and appropriate  - Administer supportive blood products/factors as ordered and appropriate  Outcome: Progressing

## 2025-02-03 NOTE — CM/SW NOTE
Bed avail at [facility] today, requesting 1330 dc if cleared  CM notified MD/RN of above    1250  Per MD pt is not medically cleared.  Cm notified [facility] liaison, they will not be able to hold the bed. CM req liaison put pt back on list for nxt female bed.    MD RN updated on above    Plan  [facility]      / to remain available for support and/or discharge planning.     Rocio Lundy RN    Ext [phone]

## 2025-02-04 VITALS
HEART RATE: 97 BPM | SYSTOLIC BLOOD PRESSURE: 113 MMHG | RESPIRATION RATE: 18 BRPM | BODY MASS INDEX: 27 KG/M2 | WEIGHT: 141 LBS | OXYGEN SATURATION: 95 % | TEMPERATURE: 98 F | DIASTOLIC BLOOD PRESSURE: 57 MMHG

## 2025-02-04 NOTE — PLAN OF CARE
Vitals within normal limits.   CBI at very slow rate - beginning of shift urine was pink clear, with some clots. Later this evening output was clear yellow. CBI clamped this evening.   +BM incontinent care provided.  Internal hemorrhoid ointment applied PRN.   Plan for rehab when medically stable for discharge.  Call light within reach.

## 2025-02-04 NOTE — CM/SW NOTE
02/04/25 1200   Discharge disposition   Expected discharge disposition subacute   Post Acute Care Provider [facility]   Discharge transportation [facility]     MDO for dc  Bed is avail at 3:30pm    Plan  [facility] amb  Pcs done  Room 210-2,   report [phone]    RN to upd pt/spouse with dc time    / to remain available for support and/or discharge planning.     Rocio Lundy RN    Ext [phone]

## 2025-02-04 NOTE — PLAN OF CARE
Pt is A&Ox 4, room air, tolerating regular diet, voiding per sky cath, passing Bms - incont., tolerating pain with repositioning, ambulating max assist. Frequently repositioned. Call light within reach, calls appropriately, I-bed awareness/alarm on while admitted. Pt cleared for discharge by MD. Education provided to pt - see AVS - pt verbalized understanding, all questions answered to the best of my ability. Report called to [facility] and given to Daniel RN. Paperwork given to transporter to provide to facility upon arrival.     Problem: Patient Centered Care  Goal: Patient preferences are identified and integrated in the patient's plan of care  Description: Interventions:  - What would you like us to know as we care for you? Ready to discharge   - Provide timely, complete, and accurate information to patient/family  - Incorporate patient and family knowledge, values, beliefs, and cultural backgrounds into the planning and delivery of care  - Encourage patient/family to participate in care and decision-making at the level they choose  - Honor patient and family perspectives and choices  Outcome: Adequate for Discharge     Problem: PAIN - ADULT  Goal: Verbalizes/displays adequate comfort level or patient's stated pain goal  Description: INTERVENTIONS:  - Encourage pt to monitor pain and request assistance  - Assess pain using appropriate pain scale  - Administer analgesics based on type and severity of pain and evaluate response  - Implement non-pharmacological measures as appropriate and evaluate response  - Consider cultural and social influences on pain and pain management  - Manage/alleviate anxiety  - Utilize distraction and/or relaxation techniques  - Monitor for opioid side effects  - Notify MD/LIP if interventions unsuccessful or patient reports new pain  - Anticipate increased pain with activity and pre-medicate as appropriate  Outcome: Adequate for Discharge     Problem: DISCHARGE PLANNING  Goal:  Discharge to home or other facility with appropriate resources  Description: INTERVENTIONS:  - Identify barriers to discharge w/pt and caregiver  - Include patient/family/discharge partner in discharge planning  - Arrange for needed discharge resources and transportation as appropriate  - Identify discharge learning needs (meds, wound care, etc)  - Arrange for interpreters to assist at discharge as needed  - Consider post-discharge preferences of patient/family/discharge partner  - Complete POLST form as appropriate  - Assess patient's ability to be responsible for managing their own health  - Refer to Case Management Department for coordinating discharge planning if the patient needs post-hospital services based on physician/LIP order or complex needs related to functional status, cognitive ability or social support system  Outcome: Adequate for Discharge     Problem: GENITOURINARY - ADULT  Goal: Absence of urinary retention  Description: INTERVENTIONS:  - Assess patient’s ability to void and empty bladder  - Monitor intake/output and perform bladder scan as needed  - Follow urinary retention protocol/standard of care  - Consider collaborating with pharmacy to review patient's medication profile  - Implement strategies to promote bladder emptying  Outcome: Adequate for Discharge     Problem: METABOLIC/FLUID AND ELECTROLYTES - ADULT  Goal: Hemodynamic stability and optimal renal function maintained  Description: INTERVENTIONS:  - Monitor labs and assess for signs and symptoms of volume excess or deficit  - Monitor intake, output and patient weight  - Monitor urine specific gravity, serum osmolarity and serum sodium as indicated or ordered  - Monitor response to interventions for patient's volume status, including labs, urine output, blood pressure (other measures as available)  - Encourage oral intake as appropriate  - Instruct patient on fluid and nutrition restrictions as appropriate  Outcome: Adequate for  Discharge     Problem: HEMATOLOGIC - ADULT  Goal: Maintains hematologic stability  Description: INTERVENTIONS  - Assess for signs and symptoms of bleeding or hemorrhage  - Monitor labs and vital signs for trends  - Administer supportive blood products/factors, fluids and medications as ordered and appropriate  - Administer supportive blood products/factors as ordered and appropriate  Outcome: Adequate for Discharge

## 2025-02-04 NOTE — DISCHARGE SUMMARY
General Medicine Discharge Summary     Patient ID:  Sharda Rai  89 year old  12/11/1932    Admit date: 1/29/2025    Discharge date and time: 2/4/2025    Attending Physician: Hugo Lira DO     Consults: IP CONSULT TO UROLOGY  IP CONSULT TO NEPHROLOGY  IP CONSULT TO SOCIAL WORK    Primary Care Physician: None Pcp     Reason for admission: Hematuria,  urinary tract infection    Risk For Readmission: Low    Discharge Diagnoses: Urinary retention [R33.9]  Acute cystitis with hematuria [N30.01]  Hematuria, unspecified type [R31.9]  See Additional Discharge Diagnoses in Hospital Course    Discharged Condition: good    Follow-up with labs/images appointments:   Close follow-up with urology for voiding trial in 1 to 2 weeks recommended    Exam  Gen: No acute distress  Pulm: Lungs clear, normal respiratory effort  CV: Heart with regular rate and rhythm  Abd: Abdomen soft,   EXT: no edema     HPI:   Per Dr. Riggins   89 year old female with PMH Hypertension, Hyperlipidemia, Pancreatic insufficiency, Myasthenia Gravis, who presents from her nursing facility due to concern of possible hematuria/dark urine/and possible UTI. Of note patient was recently discharged from the hospital on 1/11/2025, during that admission she was treated for pneumonia, positive for parainfluenza, as well as norovirus.  During that admission the patient did not have any urinary symptoms or issues with retention.       Hospital Course:   Patient was admitted for hematuria and urinary tract infection treated with IV antibiotics changed to oral at discharge based on culture results hematuria was treated with continuous bladder irrigation and urologic consultation Guevara catheter was left in place at discharge needs close follow-up with urology for voiding trial within 2 weeks.  Discharged to subacute rehab    Operative Procedures:      Imaging: No results found.    Disposition: SNF    Activity: activity as tolerated  Diet: regular diet  Wound Care:  none needed  Code Status: Full Code  O2: none    Home Medication Changes: See list below    Med list     Medication List        START taking these medications      cefuroxime 500 MG Tabs  Commonly known as: Ceftin  Take 1 tablet (500 mg total) by mouth 2 (two) times daily for 3 days.     phenylephrine-min oil-brittany 0.25-14-74.9 % Oint  Commonly known as: Formula R            CHANGE how you take these medications      aspirin 81 MG Tbec  Start taking on: February 10, 2025  What changed: These instructions start on February 10, 2025. If you are unsure what to do until then, ask your doctor or other care provider.            CONTINUE taking these medications      acetaminophen 325 MG Tabs  Commonly known as: Tylenol     atorvastatin 40 MG Tabs  Commonly known as: Lipitor     cholecalciferol 25 MCG (1000 UT) Tabs  Commonly known as: Vitamin D3     * Creon [id number] units Cpep  Generic drug: Pancrelipase (Lip-Prot-Amyl)     * Creon [id number] units Cpep  Generic drug: Pancrelipase (Lip-Prot-Amyl)     dextromethorphan-guaifenesin ER  MG Tb12  Commonly known as: Mucinex DM     gabapentin 300 MG Caps  Commonly known as: Neurontin  Take 1 capsule (300 mg total) by mouth nightly.     GlycoLax 17 GM/SCOOP Powd  Generic drug: polyethylene glycol (PEG 3350)     Imodium A-D 2 MG Caps  Generic drug: loperamide     metoprolol succinate ER 50 MG Tb24  Commonly known as: Toprol XL     multivitamin Tabs     Mycophenolate Mofetil 500 MG Tabs  Commonly known as: CELLCEPT  Take 1 tablet (500 mg total) by mouth 2 (two) times daily.     pantoprazole 40 MG Tbec  Commonly known as: Protonix     Preparation H 1 % Crea  Generic drug: Hydrocortisone (Perianal)     VITAMIN B COMPLEX OR           * This list has 2 medication(s) that are the same as other medications prescribed for you. Read the directions carefully, and ask your doctor or other care provider to review them with you.                STOP taking these medications       hydroCHLOROthiazide 25 MG Tabs     Meloxicam 15 MG Tabs               Where to Get Your Medications        These medications were sent to [id number] [place] [place] [place] [facility] [address] [phone], [phone]  [address], [place] [address]      Phone: [phone]   cefuroxime 500 MG Tabs         FU   Follow-up Information       Jaymie Khan DO Follow up.    Specialty: UROLOGY  Why: Dr. Khan has office location in [facility]  patient needs to be seen in 1-2 weeks for voiding trial  Contact information:  [address] [facility] [address] [phone]                             DC instructions:      Other Discharge Instructions:         Complete antibiotics as ordered , plan to see the Urologist for voiding trial         I reconciled current and discharge medications on day of discharge, discussed changes with patient and noted changes above.       Total Time Coordinating Care: 35 minutes    Patient had opportunity to ask questions and state understand and agree with therapeutic plan as outlined    Thank You,    Hugo Lira DO   Hospitalist with [facility]

## 2025-02-04 NOTE — PLAN OF CARE
Patient alert x4. Denies pain or nausea. Tolerating regular diet. Voiding via sky. +BM over night. CBI currently clamped with yellow output. IV abx continued. Safety precautions in place. Call light within reach.    Problem: Patient Centered Care  Goal: Patient preferences are identified and integrated in the patient's plan of care  Description: Interventions:  - What would you like us to know as we care for you?   - Provide timely, complete, and accurate information to patient/family  - Incorporate patient and family knowledge, values, beliefs, and cultural backgrounds into the planning and delivery of care  - Encourage patient/family to participate in care and decision-making at the level they choose  - Honor patient and family perspectives and choices  Outcome: Progressing     Problem: PAIN - ADULT  Goal: Verbalizes/displays adequate comfort level or patient's stated pain goal  Description: INTERVENTIONS:  - Encourage pt to monitor pain and request assistance  - Assess pain using appropriate pain scale  - Administer analgesics based on type and severity of pain and evaluate response  - Implement non-pharmacological measures as appropriate and evaluate response  - Consider cultural and social influences on pain and pain management  - Manage/alleviate anxiety  - Utilize distraction and/or relaxation techniques  - Monitor for opioid side effects  - Notify MD/LIP if interventions unsuccessful or patient reports new pain  - Anticipate increased pain with activity and pre-medicate as appropriate  Outcome: Progressing     Problem: DISCHARGE PLANNING  Goal: Discharge to home or other facility with appropriate resources  Description: INTERVENTIONS:  - Identify barriers to discharge w/pt and caregiver  - Include patient/family/discharge partner in discharge planning  - Arrange for needed discharge resources and transportation as appropriate  - Identify discharge learning needs (meds, wound care, etc)  - Arrange for  interpreters to assist at discharge as needed  - Consider post-discharge preferences of patient/family/discharge partner  - Complete POLST form as appropriate  - Assess patient's ability to be responsible for managing their own health  - Refer to Case Management Department for coordinating discharge planning if the patient needs post-hospital services based on physician/LIP order or complex needs related to functional status, cognitive ability or social support system  Outcome: Progressing     Problem: GENITOURINARY - ADULT  Goal: Absence of urinary retention  Description: INTERVENTIONS:  - Assess patient’s ability to void and empty bladder  - Monitor intake/output and perform bladder scan as needed  - Follow urinary retention protocol/standard of care  - Consider collaborating with pharmacy to review patient's medication profile  - Implement strategies to promote bladder emptying  Outcome: Progressing     Problem: METABOLIC/FLUID AND ELECTROLYTES - ADULT  Goal: Hemodynamic stability and optimal renal function maintained  Description: INTERVENTIONS:  - Monitor labs and assess for signs and symptoms of volume excess or deficit  - Monitor intake, output and patient weight  - Monitor urine specific gravity, serum osmolarity and serum sodium as indicated or ordered  - Monitor response to interventions for patient's volume status, including labs, urine output, blood pressure (other measures as available)  - Encourage oral intake as appropriate  - Instruct patient on fluid and nutrition restrictions as appropriate  Outcome: Progressing     Problem: HEMATOLOGIC - ADULT  Goal: Maintains hematologic stability  Description: INTERVENTIONS  - Assess for signs and symptoms of bleeding or hemorrhage  - Monitor labs and vital signs for trends  - Administer supportive blood products/factors, fluids and medications as ordered and appropriate  - Administer supportive blood products/factors as ordered and appropriate  Outcome:  Progressing

## 2025-02-06 ENCOUNTER — INITIAL APN SNF VISIT (OUTPATIENT)
Dept: INTERNAL MEDICINE CLINIC | Facility: SKILLED NURSING FACILITY | Age: OVER 89
End: 2025-02-06

## 2025-02-06 VITALS
SYSTOLIC BLOOD PRESSURE: 120 MMHG | OXYGEN SATURATION: 94 % | HEART RATE: 98 BPM | RESPIRATION RATE: 18 BRPM | DIASTOLIC BLOOD PRESSURE: 58 MMHG | TEMPERATURE: 97 F

## 2025-02-06 DIAGNOSIS — N30.01 ACUTE CYSTITIS WITH HEMATURIA: Primary | ICD-10-CM

## 2025-02-06 DIAGNOSIS — G70.00 MG (MYASTHENIA GRAVIS) (HCC): ICD-10-CM

## 2025-02-06 DIAGNOSIS — K64.9 HEMORRHOIDS, UNSPECIFIED HEMORRHOID TYPE: ICD-10-CM

## 2025-02-06 DIAGNOSIS — D72.829 LEUKOCYTOSIS, UNSPECIFIED TYPE: ICD-10-CM

## 2025-02-06 DIAGNOSIS — B96.20 E COLI BACTEREMIA: ICD-10-CM

## 2025-02-06 DIAGNOSIS — R33.9 URINARY RETENTION: ICD-10-CM

## 2025-02-06 DIAGNOSIS — E87.1 HYPONATREMIA: ICD-10-CM

## 2025-02-06 DIAGNOSIS — R78.81 E COLI BACTEREMIA: ICD-10-CM

## 2025-02-06 DIAGNOSIS — J18.9 COMMUNITY ACQUIRED PNEUMONIA OF LEFT LOWER LOBE OF LUNG: ICD-10-CM

## 2025-02-06 DIAGNOSIS — G62.9 NEUROPATHY: ICD-10-CM

## 2025-02-06 DIAGNOSIS — N17.9 ACUTE RENAL FAILURE, UNSPECIFIED ACUTE RENAL FAILURE TYPE: ICD-10-CM

## 2025-02-06 DIAGNOSIS — K21.9 GASTROESOPHAGEAL REFLUX DISEASE WITHOUT ESOPHAGITIS: ICD-10-CM

## 2025-02-06 DIAGNOSIS — R53.81 PHYSICAL DECONDITIONING: ICD-10-CM

## 2025-02-06 NOTE — PROGRESS NOTES
Sharda Rai  : 1932  Age 89 year old  female patient is admitted to [facility] for rehabilitation and strengthening.      [facility] Admission 2025 - 2025     Reason for visit: F/u on UTI, hematuria     HPI  This is a 89 year old female with PMH Hypertension, Hyperlipidemia, Pancreatic insufficiency, Myasthenia Gravis, who presented from [facility] due to concern of possible hematuria/dark urine/and possible UTI. Of note patient was recently discharged from the hospital on 2025, during that admission she was treated for pneumonia, positive for parainfluenza, as well as norovirus. During that admission the patient did not have any urinary symptoms or issues with retention. During her last hospital visit, however, patient was admitted for hematuria and urinary tract infection treated with IV antibiotics, later changed to oral at discharge based on culture results. Hematuria was treated with continuous bladder irrigation and urologic consultation. Sky catheter was left in place at discharge and pt needs close follow-up with urology for voiding trial within 2 weeks.  Pt was stabilized and discharged to [facility] for rehab and strengthening.     Pt seen and examined as initial APRN Visit. Pt sitting in chair, appears comfortable in NAD. Pt reports having hemmoroidal pain and is asking to have the same specific hemmorid cream given to her that she was taking in the hospital. Pt otherwise doesn't have any other issues/concerns. Denies appetite change, n/v or abdominal pain. Denies constipation or diarrhea. She has a sky in place draining clear yellow urine. Vss. All questions answered.     Past Medical History:    Blepharitis    Dermatochalasis    Dry eye syndrome of both eyes    Essential hypertension    Gallstones    High blood pressure    High cholesterol    Hyperlipidemia    Macular pucker, bilateral    Myasthenia gravis (HCC)    Pancreatic insufficiency (HCC)    Pseudophakia, both eyes      Past Surgical History:   Procedure Laterality Date    Carpal tunnel release Bilateral     Cataract      Cataract extraction w/  intraocular lens implant Left 02/20/2013    Dr. Roseann frost trad     Cataract extraction w/  intraocular lens implant Right     Cholecystectomy      Laparoscopic cholecystectomy  10/19/2024    XI ROBOT-ASSISTED LAPAROSCOPIC CHOLECYSTECTOMY    Yag capsulotomy - od - right eye Right 04/10/2007     Family History   Problem Relation Age of Onset    Retinal detachment Father     Glaucoma Mother      Social History     Socioeconomic History    Marital status:    Tobacco Use    Smoking status: Never    Smokeless tobacco: Never   Vaping Use    Vaping status: Never Used   Substance and Sexual Activity    Alcohol use: Never    Drug use: Never     Social Drivers of Health     Food Insecurity: No Food Insecurity (1/29/2025)    Food Insecurity     Food Insecurity: Never true   Transportation Needs: No Transportation Needs (1/29/2025)    Transportation Needs     Lack of Transportation: No   Housing Stability: Low Risk  (1/29/2025)    Housing Stability     Housing Instability: No     IMMUNIZATIONS    There is no immunization history on file for this patient.     ALLERGIES:  Allergies[1]    CODE STATUS:  Full Code    ADVANCED CARE PLANNING TEAM: Will need family care plan     CURRENT MEDICATIONS - reviewed and updated on SNF [facility]    SUBJECTIVE    Pt seen and examined as initial APRN Visit. Pt sitting in chair, appears comfortable in NAD. Pt reports having hemmoroidal pain and is asking to have the same specific hemmorid cream given to her that she was taking in the hospital. Pt otherwise doesn't have any other issues/concerns. Denies appetite change, n/v or abdominal pain. Denies constipation or diarrhea. She has a sky in place draining clear yellow urine. Vss. All questions answered.     PHYSICAL EXAM:  Vitals:    02/06/25 1218   BP: 120/58   Pulse: 98   Resp: 18   Temp: 96.9 °F (36.1 °C)    SpO2: 94%      GENERAL HEALTH:well developed, well nourished, in no apparent distress   LINES, TUBES, DRAINS:  sky catheter   SKIN: no rashes, no suspicious lesions  WOUND: see wound assessment,   EYES: PERRLA, EOMI, sclera anicteric, conjunctiva normal; there is no nystagmus, no drainage from eyes  HENT: normocephalic; normal nose, no nasal drainage, mucous membranes pink, moist, pharynx no exudate, no visible cerumen.   NECK:supple, FROM; no JVD, no TMG, no carotid bruits   BREAST: deferred exam   RESPIRATORY:clear to percussion and auscultation  CARDIOVASCULAR: S1, S2 normal, RRR; no S3, no S4  ABDOMEN:  normal active BS+, soft, nondistended; no organomegaly, no masses; no bruits; nontender, no guarding, no rebound tenderness.  :no suprapubic distension  LYMPHATIC:no lymphedema  MUSCULOSKELETAL: no acute synovitis upper or lower extremity   EXTREMITIES/VASCULAR:no cyanosis, clubbing or edema  NEUROLOGIC:intact; no sensorimotor deficit  PSYCHIATRIC: alert and oriented x 3; affect appropriate     MEDICAL DECISION MAKING  Capable     DIAGNOSTICS REVIEWED AT THIS VISIT:  [facility] medical records    SEE PLAN BELOW    Hematuria  Urinary retention   Bilateral hydronephrosis   UTI  LISANDRO  - sky placed with CBI on admission to hospital. CBI was stopped on 1/3, then restarted on 2/1   - urine clear eventually cleared  - sky catheter placed in hospital with plans for an in office void trial with urology in 1-2 weeks   - IV antibiotic started with rocephin IV---narrowed to IV ancef given sensitivity on 1/31   - pt discharged to rehab on po cefuroxime 500 mg bid x 3 days, continue   - LISANDRO improved with IV fluids in hospital   - weekly labs in rehab   - monitor      Myasthenia gravis  - pt is on cellcept, continue 500 mg bid      Pancreatic insufficiency  - pt is on creon, continue       HTN  - d/t low NA in hospital, hydrochlorothiazide held, likely does not need at DC. Was not resumed at discharge, and likely does not  need. Currently bp is stable   - continue MeToprol succinate 25 mg daily   - monitor     Neuropathy   - continue gabapentin 300 mg hs     HLD   - continue atorvastatin 40 mg hs     Physical Deconditioning/Impaired mobility and ADLs/At risk for falling  - Fall Precautions  - PT/OT/ST to evaluate and treat  - [facility] team to establish care plan meeting with patient and POA/family as appropriate  - Anticipate DC on or before TBD; SW to assist patient/family w/ DC planning  - DC Plan:  TBD    GERD  - continue protonix 40 mg daily     Hemorrhoids  Bowel regimen  - continue prep h   - continue Phenylephrine-Mineral Oil-Pet Rectal Ointment 0.25-14-74.9 %  - continue miralax daily prn   - continue imodium 2 mg hs for diarrhea   - Monitor BM status     DVT Prophylaxis   - Encourage early mobilization and participation in PT/OT as able  - continue asa 81 mg daily     Pain Management  - Offer to pre-medicate 30-60 min prior to therapy  - Physiatry evaluation with management appreciated  - Acetaminophen 650 mg every 6 hrs prn      Vitamins/supplements as r/t deficiencies  - [facility] RD to follow while in rehab; supplementation/diet as per [facility] RD  - May continue home supplements  - continue Cholecalciferol Oral Tablet 25 MCG daily   - continue mvt daily   - continue vit b complex   - continue vit c daily     LABS  - CBC/CMP weekly while in [facility]    FOLLOW UP APPOINTMENTS  Future Appointments   Date Time Provider Department Center   3/11/2025 10:00 AM William Castillo MD [facility] [place] [facility]      60 minutes spent w/ patient and staff, including but not limited to reviewing present status, needs, abilities with disciplines, reviewing medical records, vital signs, labs, completing med reconciliation and entering orders for continued care in [facility]      Note to patient: The 21st Century Cures Act makes medical notes like these available to patients in the interest of transparency. However, this is a medical document intended as peer to peer  communication. It is written in medical language and may contain abbreviations or verbiage that are unfamiliar. It may appear blunt or direct. Medical documents are intended to carry relevant information, facts as evident, and the clinical opinion of the practitioner who signs the document.     Maricruz Rojas, APRN  02/06/25           [1]   Allergies  Allergen Reactions    Fish-Derived Products OTHER (SEE COMMENTS)     Stomach cramps    Shellfish DIARRHEA

## 2025-02-13 ENCOUNTER — SNF VISIT (OUTPATIENT)
Dept: INTERNAL MEDICINE CLINIC | Facility: SKILLED NURSING FACILITY | Age: OVER 89
End: 2025-02-13

## 2025-02-13 VITALS
SYSTOLIC BLOOD PRESSURE: 105 MMHG | DIASTOLIC BLOOD PRESSURE: 74 MMHG | HEART RATE: 90 BPM | RESPIRATION RATE: 18 BRPM | TEMPERATURE: 98 F | OXYGEN SATURATION: 92 %

## 2025-02-13 DIAGNOSIS — J18.9 COMMUNITY ACQUIRED PNEUMONIA OF LEFT LOWER LOBE OF LUNG: ICD-10-CM

## 2025-02-13 DIAGNOSIS — N30.01 ACUTE CYSTITIS WITH HEMATURIA: ICD-10-CM

## 2025-02-13 DIAGNOSIS — N17.9 ACUTE KIDNEY INJURY: ICD-10-CM

## 2025-02-13 DIAGNOSIS — G70.00 MG (MYASTHENIA GRAVIS) (HCC): ICD-10-CM

## 2025-02-13 DIAGNOSIS — K21.9 GASTROESOPHAGEAL REFLUX DISEASE WITHOUT ESOPHAGITIS: ICD-10-CM

## 2025-02-13 DIAGNOSIS — K81.0 ACUTE CHOLECYSTITIS: Primary | ICD-10-CM

## 2025-02-13 DIAGNOSIS — R53.81 PHYSICAL DECONDITIONING: ICD-10-CM

## 2025-02-13 DIAGNOSIS — D64.9 ANEMIA, UNSPECIFIED TYPE: ICD-10-CM

## 2025-02-13 DIAGNOSIS — N13.30 HYDRONEPHROSIS, UNSPECIFIED HYDRONEPHROSIS TYPE: ICD-10-CM

## 2025-02-13 DIAGNOSIS — I10 PRIMARY HYPERTENSION: ICD-10-CM

## 2025-02-13 DIAGNOSIS — D72.829 LEUKOCYTOSIS, UNSPECIFIED TYPE: ICD-10-CM

## 2025-02-13 DIAGNOSIS — R33.9 URINARY RETENTION: ICD-10-CM

## 2025-02-13 DIAGNOSIS — G62.9 NEUROPATHY: ICD-10-CM

## 2025-02-13 DIAGNOSIS — N17.9 ACUTE RENAL FAILURE, UNSPECIFIED ACUTE RENAL FAILURE TYPE: ICD-10-CM

## 2025-02-13 DIAGNOSIS — E87.1 HYPONATREMIA: ICD-10-CM

## 2025-02-13 PROCEDURE — 99309 SBSQ NF CARE MODERATE MDM 30: CPT

## 2025-02-14 NOTE — PROGRESS NOTES
Sharda Rai  : 1932  Age 89 year old  female patient is admitted to [facility] for rehabilitation and strengthening.       [facility] Admission 2025 - 2025      Reason for visit: F/u on UTI, hematuria      HPI  This is a 89 year old female with PMH Hypertension, Hyperlipidemia, Pancreatic insufficiency, Myasthenia Gravis, who presented from [facility] due to concern of possible hematuria/dark urine/and possible UTI. Of note patient was recently discharged from the hospital on 2025, during that admission she was treated for pneumonia, positive for parainfluenza, as well as norovirus. During that admission the patient did not have any urinary symptoms or issues with retention. During her last hospital visit, however, patient was admitted for hematuria and urinary tract infection treated with IV antibiotics, later changed to oral at discharge based on culture results. Hematuria was treated with continuous bladder irrigation and urologic consultation. Guevara catheter was left in place at discharge and pt needs close follow-up with urology for voiding trial within 2 weeks.  Pt was stabilized and discharged to [facility] for rehab and strengthening.     Pt seen and examined in follow up. Pt is sitting up in chair and just finished eating dinner. She appears comfortable in NAD. Pt reports feeling well, states her sacral pain is well controlled, but she's just afraid of becoming constipated. Her last BM was today. Pt denies appetite change, n/v or abdominal pain. States she's been participating and progressing in PT daily. Denies chest pain or sob. She has no issues/concerns. Vss. All questions answered.     ALLERGIES:  Allergies[1]    IMMUNIZATIONS    There is no immunization history on file for this patient.     CODE STATUS:  Full Code    ADVANCED CARE PLANNING TEAM: Will need family care plan    CURRENT MEDICATIONS - reviewed and updated on [facility] EMR     SUBJECTIVE    REVIEW OF SYSTEMS:    Pt  seen and examined in follow up. Pt is sitting up in chair and just finished eating dinner. She appears comfortable in NAD. Pt reports feeling well, states her sacral pain is well controlled, but she's just afraid of becoming constipated. Her last BM was today. Pt denies appetite change, n/v or abdominal pain. States she's been participating and progressing in PT daily. Denies chest pain or sob. She has no issues/concerns. Vss. All questions answered.     PHYSICAL EXAM:  Vitals:    02/13/25 1846   BP: 105/74   Pulse: 90   Resp: 18   Temp: 98.2 °F (36.8 °C)   SpO2: 92%      GENERAL HEALTH:well developed, well nourished, in no apparent distress   LINES, TUBES, DRAINS:  sky catheter   SKIN: no rashes, no suspicious lesions  WOUND: see wound assessment,   EYES: PERRLA, EOMI, sclera anicteric, conjunctiva normal; there is no nystagmus, no drainage from eyes  HENT: normocephalic; normal nose, no nasal drainage, mucous membranes pink, moist, pharynx no exudate, no visible cerumen.   NECK:supple, FROM; no JVD, no TMG, no carotid bruits   BREAST: deferred exam   RESPIRATORY:clear to percussion and auscultation  CARDIOVASCULAR: S1, S2 normal, RRR; no S3, no S4  ABDOMEN:  normal active BS+, soft, nondistended; no organomegaly, no masses; no bruits; nontender, no guarding, no rebound tenderness.  :no suprapubic distension  LYMPHATIC:no lymphedema  MUSCULOSKELETAL: no acute synovitis upper or lower extremity   EXTREMITIES/VASCULAR:no cyanosis, clubbing or edema  NEUROLOGIC:intact; no sensorimotor deficit  PSYCHIATRIC: alert and oriented x 3; affect appropriate     DIAGNOSTICS REVIEWED AT THIS VISIT:  Labs 02/10/25: wbc 5.84, hgb 10.9, plt 365, gluc 103, na 138, k 3.8, bun 15, creat 0.47    SEE PLAN BELOW    Hematuria  Urinary retention   Bilateral hydronephrosis   UTI  LISANDRO  - sky placed with CBI on admission to hospital. CBI was stopped on 1/3, then restarted on 2/1   - urine clear eventually cleared  - sky catheter  placed in hospital with plans for an in office void trial with urology in 1-2 weeks   - IV antibiotic started with rocephin IV---narrowed to IV ancef given sensitivity on 1/31   - pt discharged to rehab on po cefuroxime 500 mg bid x 3 days, continue   - LISANDRO improved with IV fluids in hospital   - weekly labs in rehab   - monitor      Myasthenia gravis  - pt is on cellcept, continue 500 mg bid      Pancreatic insufficiency  - pt is on creon, continue       HTN  - d/t low NA in hospital, hydrochlorothiazide held, likely does not need at DC. Was not resumed at discharge, and likely does not need. Currently bp is stable   - continue MeToprol succinate 25 mg daily   - monitor      Neuropathy   - continue gabapentin 300 mg hs      HLD   - continue atorvastatin 40 mg hs      Physical Deconditioning/Impaired mobility and ADLs/At risk for falling  - Fall Precautions  - PT/OT/ST to evaluate and treat  - [facility] team to establish care plan meeting with patient and POA/family as appropriate  - Anticipate DC on or before 02/24/25; SW to assist patient/family w/ DC planning  - DC Plan:  02/24/25     GERD  - continue protonix 40 mg daily      Hemorrhoids  Bowel regimen  - continue prep h   - continue Phenylephrine-Mineral Oil-Pet Rectal Ointment 0.25-14-74.9 %  - continue miralax daily prn   - continue imodium 2 mg hs for diarrhea   - Monitor BM status      DVT Prophylaxis   - Encourage early mobilization and participation in PT/OT as able  - continue asa 81 mg daily      Pain Management  - Offer to pre-medicate 30-60 min prior to therapy  - Physiatry evaluation with management appreciated  - Acetaminophen 650 mg every 6 hrs prn      Vitamins/supplements as r/t deficiencies  - NEGR[facility] RD to follow while in rehab; supplementation/diet as per NEGR[facility] RD  - May continue home supplements  - continue Cholecalciferol Oral Tablet 25 MCG daily   - continue mvt daily   - continue vit b complex   - continue vit c daily      LABS  - CBC/CMP weekly while  in [facility]    FOLLOW UP APPOINTMENTS  Future Appointments   Date Time Provider Department [place]   3/11/2025 10:00 AM William Castillo MD [facility] [place] [facility]      35 minutes spent w/ patient and staff, including but not limited to/ reviewing present status, needs, abilities with disciplines, reviewing medical records, vital signs, labs, completing medication reconciliation and entering orders for continued care in [facility]     Note to patient: The 21st Century Cures Act makes medical notes like these available to patients in the interest of transparency. However, this is a medical document intended as peer to peer communication. It is written in medical language and may contain abbreviations or verbiage that are unfamiliar. It may appear blunt or direct. Medical documents are intended to carry relevant information, facts as evident, and the clinical opinion of the practitioner who signs the document.     Maricruz Rojas, APRN   02/13/25         [1]   Allergies  Allergen Reactions    Fish-Derived Products OTHER (SEE COMMENTS)     Stomach cramps    Shellfish DIARRHEA

## 2025-02-20 ENCOUNTER — SNF VISIT (OUTPATIENT)
Dept: INTERNAL MEDICINE CLINIC | Facility: SKILLED NURSING FACILITY | Age: OVER 89
End: 2025-02-20

## 2025-02-20 VITALS
TEMPERATURE: 98 F | RESPIRATION RATE: 18 BRPM | HEART RATE: 89 BPM | SYSTOLIC BLOOD PRESSURE: 100 MMHG | OXYGEN SATURATION: 95 % | DIASTOLIC BLOOD PRESSURE: 52 MMHG

## 2025-02-20 DIAGNOSIS — G89.29 CHRONIC MIDLINE LOW BACK PAIN, UNSPECIFIED WHETHER SCIATICA PRESENT: ICD-10-CM

## 2025-02-20 DIAGNOSIS — K86.89 PANCREATIC INSUFFICIENCY (HCC): ICD-10-CM

## 2025-02-20 DIAGNOSIS — N30.01 ACUTE CYSTITIS WITH HEMATURIA: ICD-10-CM

## 2025-02-20 DIAGNOSIS — E78.5 HYPERLIPIDEMIA, UNSPECIFIED HYPERLIPIDEMIA TYPE: ICD-10-CM

## 2025-02-20 DIAGNOSIS — G70.00 MYASTHENIA GRAVIS (HCC): ICD-10-CM

## 2025-02-20 DIAGNOSIS — D64.9 ANEMIA, UNSPECIFIED TYPE: ICD-10-CM

## 2025-02-20 DIAGNOSIS — K64.9 HEMORRHOIDS, UNSPECIFIED HEMORRHOID TYPE: ICD-10-CM

## 2025-02-20 DIAGNOSIS — N17.9 ACUTE KIDNEY INJURY: Primary | ICD-10-CM

## 2025-02-20 DIAGNOSIS — R53.81 PHYSICAL DECONDITIONING: ICD-10-CM

## 2025-02-20 DIAGNOSIS — G62.9 NEUROPATHY: ICD-10-CM

## 2025-02-20 DIAGNOSIS — G70.00 MG (MYASTHENIA GRAVIS) (HCC): ICD-10-CM

## 2025-02-20 DIAGNOSIS — K21.9 GASTROESOPHAGEAL REFLUX DISEASE WITHOUT ESOPHAGITIS: ICD-10-CM

## 2025-02-20 DIAGNOSIS — M54.50 CHRONIC MIDLINE LOW BACK PAIN, UNSPECIFIED WHETHER SCIATICA PRESENT: ICD-10-CM

## 2025-02-20 DIAGNOSIS — N13.30 BILATERAL HYDRONEPHROSIS: ICD-10-CM

## 2025-02-20 PROCEDURE — 99309 SBSQ NF CARE MODERATE MDM 30: CPT

## 2025-02-20 NOTE — PROGRESS NOTES
Sharda Rai  : 1932  Age 89 year old  female patient is admitted to [facility] for rehabilitation and strengthening.       [facility] Admission 2025 - 2025      Reason for visit: F/u on UTI, hematuria      HPI  This is a 89 year old female with PMH Hypertension, Hyperlipidemia, Pancreatic insufficiency, Myasthenia Gravis, who presented from [facility] due to concern of possible hematuria/dark urine/and possible UTI. Of note patient was recently discharged from the hospital on 2025, during that admission she was treated for pneumonia, positive for parainfluenza, as well as norovirus. During that admission the patient did not have any urinary symptoms or issues with retention. During her last hospital visit, however, patient was admitted for hematuria and urinary tract infection treated with IV antibiotics, later changed to oral at discharge based on culture results. Hematuria was treated with continuous bladder irrigation and urologic consultation. Guevara catheter was left in place at discharge and pt needs close follow-up with urology for voiding trial within 2 weeks.  Pt was stabilized and discharged to [facility] for rehab and strengthening.     Pt seen and examined in follow up. Pt is sitting up in chair, appears comfortable in NAD. Pt reports concerns over her meloxicam being stopped in hospital before discharge. She states she's unsure as to why it's been stopped, but she'd like it to be restarted as she's been taking it for 10 years for chronic pain and believes her not currently taking it is making her not progressing in PT as quickly as she'd like. She reports her pain needs to be better controlled. She denies chest pain or sob otherwise. Denies appetite change, n/v, abdominal pain. Denies constipation or diarrhea. She has no other issues/concerns. Vss     ALLERGIES:  Allergies[1]    IMMUNIZATIONS    There is no immunization history on file for this patient.     CODE STATUS:  Full  Code    ADVANCED CARE PLANNING TEAM: Will need family care plan    CURRENT MEDICATIONS - reviewed and updated on [facility] EMR     SUBJECTIVE    Pt seen and examined in follow up. Pt is sitting up in chair, appears comfortable in NAD. Pt reports concerns over her meloxicam being stopped in hospital before discharge. She states she's unsure as to why it's been stopped, but she'd like it to be restarted as she's been taking it for 10 years for chronic pain and believes her not currently taking it is making her not progressing in PT as quickly as she'd like. She reports her pain needs to be better controlled. She denies chest pain or sob otherwise. Denies appetite change, n/v, abdominal pain. Denies constipation or diarrhea. She has no other issues/concerns. Vss     PHYSICAL EXAM:  Vitals:    02/20/25 1410   BP: 100/52   Pulse: 89   Resp: 18   Temp: 97.7 °F (36.5 °C)   SpO2: 95%      GENERAL HEALTH:well developed, well nourished, in no apparent distress   LINES, TUBES, DRAINS:  sky catheter   SKIN: no rashes, no suspicious lesions  WOUND: see wound assessment,   EYES: PERRLA, EOMI, sclera anicteric, conjunctiva normal; there is no nystagmus, no drainage from eyes  HENT: normocephalic; normal nose, no nasal drainage, mucous membranes pink, moist, pharynx no exudate, no visible cerumen.   NECK:supple, FROM; no JVD, no TMG, no carotid bruits   BREAST: deferred exam   RESPIRATORY:clear to percussion and auscultation  CARDIOVASCULAR: S1, S2 normal, RRR; no S3, no S4  ABDOMEN:  normal active BS+, soft, nondistended; no organomegaly, no masses; no bruits; nontender, no guarding, no rebound tenderness.  :no suprapubic distension  LYMPHATIC:no lymphedema  MUSCULOSKELETAL: no acute synovitis upper or lower extremity   EXTREMITIES/VASCULAR:no cyanosis, clubbing or edema  NEUROLOGIC:intact; no sensorimotor deficit  PSYCHIATRIC: alert and oriented x 3; affect appropriate     DIAGNOSTICS REVIEWED AT THIS VISIT:  Labs 02/10/25: wbc  5.84, hgb 10.9, plt 365, gluc 103, na 138, k 3.8, bun 15, creat 0.47   Labs 02/17/25: wbc 5.82, hgb 10.9, plt 399, gluc 116, na 142, k 3.4, bun 25, creat 0.58    SEE PLAN BELOW    Hematuria  Urinary retention   Bilateral hydronephrosis   UTI  LISANDRO  - sky placed with CBI on admission to hospital. CBI was stopped on 1/3, then restarted on 2/1   - urine clear eventually cleared  - sky catheter placed in hospital with plans for an in office void trial with urology in 1-2 weeks   - IV antibiotic started with rocephin IV---narrowed to IV ancef given sensitivity on 1/31   - pt discharged to rehab on po cefuroxime 500 mg bid x 3 days, continue   - LISANDRO improved with IV fluids in hospital   - weekly labs in rehab   - monitor      Myasthenia gravis  - pt is on cellcept, continue 500 mg bid      Pancreatic insufficiency  - pt is on creon, continue       HTN  - d/t low NA in hospital, hydrochlorothiazide held, likely does not need at DC. Was not resumed at discharge, and likely does not need. Currently bp is stable   - continue MeToprol succinate 25 mg daily   - monitor      Neuropathy   - continue gabapentin 300 mg hs      HLD   - continue atorvastatin 40 mg hs      Physical Deconditioning/Impaired mobility and ADLs/At risk for falling  - Fall Precautions  - PT/OT/ST to evaluate and treat  - [facility] team to establish care plan meeting with patient and POA/family as appropriate  - Anticipate DC on or before 02/26/25; SW to assist patient/family w/ DC planning  - DC Plan:  02/26/25     GERD  - continue protonix 40 mg daily      Hemorrhoids  Bowel regimen  - continue prep h   - continue Phenylephrine-Mineral Oil-Pet Rectal Ointment 0.25-14-74.9 %  - continue miralax daily prn   - continue imodium 2 mg hs for diarrhea   - Monitor BM status      DVT Prophylaxis   - Encourage early mobilization and participation in PT/OT as able  - continue asa 81 mg daily     Chronic back pain   Pain Management  - Offer to pre-medicate 30-60 min  prior to therapy  - Physiatry evaluation with management appreciated  - Acetaminophen 650 mg every 6 hrs prn   - has been taking meloxicam 15 mg daily x almost 10 years, but it was stopped during last hospitalization at discharge for unclear reasons  - restart meloxicam 15 mg daily   - monitor      Vitamins/supplements as r/t deficiencies  - [facility] [place] RD to follow while in rehab; supplementation/diet as per [facility] RD  - May continue home supplements  - continue Cholecalciferol Oral Tablet 25 MCG daily   - continue mvt daily   - continue vit b complex   - continue vit c daily      LABS  - CBC/CMP weekly while in [facility]    FOLLOW UP APPOINTMENTS  Future Appointments   Date Time Provider Department Center   3/11/2025 10:00 AM William Castillo MD [facility] [place] [facility]      35 minutes spent w/ patient and staff, including but not limited to/ reviewing present status, needs, abilities with disciplines, reviewing medical records, vital signs, labs, completing medication reconciliation and entering orders for continued care in [facility]     Note to patient: The 21st Century Cures Act makes medical notes like these available to patients in the interest of transparency. However, this is a medical document intended as peer to peer communication. It is written in medical language and may contain abbreviations or verbiage that are unfamiliar. It may appear blunt or direct. Medical documents are intended to carry relevant information, facts as evident, and the clinical opinion of the practitioner who signs the document.     Maricruz Rojas, APRN   02/20/25         [1]   Allergies  Allergen Reactions    Fish-Derived Products OTHER (SEE COMMENTS)     Stomach cramps    Shellfish DIARRHEA

## 2025-02-24 ENCOUNTER — SNF DISCHARGE (OUTPATIENT)
Dept: INTERNAL MEDICINE CLINIC | Facility: SKILLED NURSING FACILITY | Age: OVER 89
End: 2025-02-24

## 2025-02-24 VITALS
DIASTOLIC BLOOD PRESSURE: 60 MMHG | SYSTOLIC BLOOD PRESSURE: 109 MMHG | RESPIRATION RATE: 18 BRPM | OXYGEN SATURATION: 97 % | TEMPERATURE: 97 F | HEART RATE: 94 BPM

## 2025-02-24 DIAGNOSIS — R53.81 PHYSICAL DECONDITIONING: ICD-10-CM

## 2025-02-24 DIAGNOSIS — G70.00 MYASTHENIA GRAVIS (HCC): ICD-10-CM

## 2025-02-24 DIAGNOSIS — N17.9 ACUTE KIDNEY INJURY: ICD-10-CM

## 2025-02-24 DIAGNOSIS — J18.9 COMMUNITY ACQUIRED PNEUMONIA OF LEFT LOWER LOBE OF LUNG: ICD-10-CM

## 2025-02-24 DIAGNOSIS — N30.00 ACUTE CYSTITIS WITHOUT HEMATURIA: ICD-10-CM

## 2025-02-24 DIAGNOSIS — E78.5 HYPERLIPIDEMIA, UNSPECIFIED HYPERLIPIDEMIA TYPE: ICD-10-CM

## 2025-02-24 DIAGNOSIS — N17.9 ACUTE RENAL FAILURE, UNSPECIFIED ACUTE RENAL FAILURE TYPE: ICD-10-CM

## 2025-02-24 DIAGNOSIS — D72.829 LEUKOCYTOSIS, UNSPECIFIED TYPE: ICD-10-CM

## 2025-02-24 DIAGNOSIS — I10 PRIMARY HYPERTENSION: ICD-10-CM

## 2025-02-24 DIAGNOSIS — N30.01 ACUTE CYSTITIS WITH HEMATURIA: ICD-10-CM

## 2025-02-24 DIAGNOSIS — R33.9 URINARY RETENTION: Primary | ICD-10-CM

## 2025-02-24 NOTE — PROGRESS NOTES
Sharda Rai, 12/11/1932, 89 year old, female is being discharged from [facility] to home      DISCHARGE SUMMARY    Date of Admission to [facility]: 2/4/2025     Date of Discharge from [facility]: 02/26/25                                 Admitting Diagnoses: F/u on UTI, hematuria     Reason for Admission to [facility]: Rehabilitation and strengthening      PHYSICAL EXAM:    Vitals:    02/24/25 1246   BP: 109/60   Pulse: 94   Resp: 18   Temp: 96.8 °F (36 °C)   SpO2: 97%      GENERAL HEALTH:well developed, well nourished, in no apparent distress   LINES, TUBES, DRAINS:  sky catheter   SKIN: no rashes, no suspicious lesions  WOUND: see wound assessment,   EYES: PERRLA, EOMI, sclera anicteric, conjunctiva normal; there is no nystagmus, no drainage from eyes  HENT: normocephalic; normal nose, no nasal drainage, mucous membranes pink, moist, pharynx no exudate, no visible cerumen.   NECK:supple, FROM; no JVD, no TMG, no carotid bruits   BREAST: deferred exam   RESPIRATORY:clear to percussion and auscultation  CARDIOVASCULAR: S1, S2 normal, RRR; no S3, no S4  ABDOMEN:  normal active BS+, soft, nondistended; no organomegaly, no masses; no bruits; nontender, no guarding, no rebound tenderness.  :no suprapubic distension  LYMPHATIC:no lymphedema  MUSCULOSKELETAL: no acute synovitis upper or lower extremity   EXTREMITIES/VASCULAR:no cyanosis, clubbing or edema  NEUROLOGIC:intact; no sensorimotor deficit  PSYCHIATRIC: alert and oriented x 3; affect appropriate     CURRENT MANAGEMENT AT [facility]     Hematuria  Urinary retention   Bilateral hydronephrosis   UTI  LISANDRO  - sky placed with CBI on admission to hospital. CBI was stopped on 1/3, then restarted on 2/1   - urine clear eventually cleared  - sky catheter placed in hospital with plans for an in office void trial with urology in 1-2 weeks   - IV antibiotic started with rocephin IV---narrowed to IV ancef given sensitivity on 1/31   - pt discharged to rehab on  po cefuroxime 500 mg bid x 3 days, continue   - LISANDRO improved with IV fluids in hospital   - weekly labs in rehab   - monitor      Myasthenia gravis  - pt is on cellcept, continue 500 mg bid      Pancreatic insufficiency  - pt is on creon, continue       HTN  - d/t low NA in hospital, hydrochlorothiazide held, likely does not need at DC. Was not resumed at discharge, and likely does not need. Currently bp is stable   - continue MeToprol succinate 25 mg daily   - monitor      Neuropathy   - continue gabapentin 300 mg hs      HLD   - continue atorvastatin 40 mg hs      Physical Deconditioning/Impaired mobility and ADLs/At risk for falling  - Fall Precautions  - PT/OT/ST to evaluate and treat  - [facility] team to establish care plan meeting with patient and POA/family as appropriate  - Anticipate DC on or before 02/26/25; SW to assist patient/family w/ DC planning  - DC Plan:  02/26/25     GERD  - continue protonix 40 mg daily      Hemorrhoids  Bowel regimen  - continue prep h   - continue Phenylephrine-Mineral Oil-Pet Rectal Ointment 0.25-14-74.9 %  - continue miralax daily prn   - continue imodium 2 mg hs for diarrhea   - Monitor BM status      DVT Prophylaxis   - Encourage early mobilization and participation in PT/OT as able  - continue asa 81 mg daily      Chronic back pain   Pain Management  - Offer to pre-medicate 30-60 min prior to therapy  - Physiatry evaluation with management appreciated  - Acetaminophen 650 mg every 6 hrs prn   - has been taking meloxicam 15 mg daily x almost 10 years, but it was stopped during last hospitalization at discharge for unclear reasons  - restarted meloxicam 15 mg daily on 02/20/25, pt already reports improvement in pain as of 02/24/25  -  continue meloxicam 15 mg daily   - monitor      Vitamins/supplements as r/t deficiencies  - [facility]GR[facility] RD to follow while in rehab; supplementation/diet as per [facility]GR[facility] RD  - May continue home supplements  - continue Cholecalciferol Oral Tablet 25 MCG daily   -  continue mvt daily   - continue vit b complex   - continue vit c daily      LABS  - CBC/CMP weekly while in [facility]    Medication Reconciliation Completed:  Yes - All medications and side effects reviewed with patient    FOLLOW UP APPOINTMENTS  Future Appointments   Date Time Provider Department Center   3/11/2025 10:00 AM William Castillo MD [facility] [place] [facility]      60 minutes spent w/ patient and staff, including but not limited to reviewing present status, needs, abilities with disciplines, reviewing medical records, vital signs, labs, completing med reconciliation and entering orders for continued care in [facility]  and at discharge     Note to patient: The 21st Century Cures Act makes medical notes like these available to patients in the interest of transparency. However, this is a medical document intended as peer to peer communication. It is written in medical language and may contain abbreviations or verbiage that are unfamiliar. It may appear blunt or direct. Medical documents are intended to carry relevant information, facts as evident, and the clinical opinion of the practitioner who signs the document.     Maricruz Rojas, APRN   2/24/2025

## 2025-04-10 ENCOUNTER — OFFICE VISIT (OUTPATIENT)
Dept: NEUROLOGY | Facility: CLINIC | Age: OVER 89
End: 2025-04-10
Payer: MEDICARE

## 2025-04-10 DIAGNOSIS — G70.00 MG (MYASTHENIA GRAVIS) (HCC): Primary | ICD-10-CM

## 2025-04-10 PROBLEM — I10 ESSENTIAL HYPERTENSION: Status: ACTIVE | Noted: 2023-03-09

## 2025-04-10 PROBLEM — E78.00 HIGH BLOOD CHOLESTEROL: Status: ACTIVE | Noted: 2025-04-10

## 2025-04-10 PROBLEM — J06.9 UPPER RESPIRATORY INFECTION: Status: ACTIVE | Noted: 2022-06-01

## 2025-04-10 PROBLEM — R33.9 RETENTION OF URINE: Status: ACTIVE | Noted: 2025-02-04

## 2025-04-10 PROBLEM — R05.9 COUGH: Status: ACTIVE | Noted: 2023-03-09

## 2025-04-10 PROBLEM — K21.00 GASTRO-ESOPHAGEAL REFLUX DISEASE WITH ESOPHAGITIS: Status: ACTIVE | Noted: 2023-03-09

## 2025-04-10 PROBLEM — K86.89 PANCREATIC INSUFFICIENCY (HCC): Status: ACTIVE | Noted: 2023-04-07

## 2025-04-10 PROBLEM — N30.00 ACUTE CYSTITIS: Status: ACTIVE | Noted: 2022-06-01

## 2025-04-10 PROBLEM — G20.C PARKINSONISM (HCC): Status: ACTIVE | Noted: 2020-02-19

## 2025-04-10 PROBLEM — G60.9 IDIOPATHIC PERIPHERAL NEUROPATHY: Status: ACTIVE | Noted: 2023-03-09

## 2025-04-10 PROBLEM — G25.0 ESSENTIAL TREMOR: Status: ACTIVE | Noted: 2023-04-07

## 2025-04-10 PROBLEM — M19.90 OSTEOARTHROSIS: Status: ACTIVE | Noted: 2023-03-09

## 2025-04-10 PROBLEM — R35.0 URINARY FREQUENCY: Status: ACTIVE | Noted: 2020-02-19

## 2025-04-10 PROBLEM — D69.2 SENILE PURPURA: Status: ACTIVE | Noted: 2023-03-09

## 2025-04-10 PROBLEM — S83.90XA SPRAIN OF KNEE: Status: ACTIVE | Noted: 2022-06-01

## 2025-04-10 PROBLEM — K86.2 PANCREAS CYST (HCC): Status: ACTIVE | Noted: 2022-05-04

## 2025-04-10 PROBLEM — N39.0 URINARY TRACT INFECTIOUS DISEASE: Status: ACTIVE | Noted: 2025-02-04

## 2025-04-10 PROCEDURE — G2211 COMPLEX E/M VISIT ADD ON: HCPCS | Performed by: OTHER

## 2025-04-10 PROCEDURE — 99214 OFFICE O/P EST MOD 30 MIN: CPT | Performed by: OTHER

## 2025-04-10 RX ORDER — MYCOPHENOLATE MOFETIL 500 MG/1
500 TABLET ORAL 2 TIMES DAILY
Qty: 180 TABLET | Refills: 3 | Status: SHIPPED | OUTPATIENT
Start: 2025-04-10

## 2025-04-10 RX ORDER — CEFPODOXIME PROXETIL 100 MG/1
100 TABLET, FILM COATED ORAL EVERY 12 HOURS
COMMUNITY
Start: 2025-03-31

## 2025-04-10 RX ORDER — SULFAMETHOXAZOLE AND TRIMETHOPRIM 800; 160 MG/1; MG/1
1 TABLET ORAL 2 TIMES DAILY
COMMUNITY
Start: 2025-02-28

## 2025-04-10 RX ORDER — GABAPENTIN 300 MG/1
300 CAPSULE ORAL NIGHTLY
Qty: 90 CAPSULE | Refills: 3 | Status: SHIPPED | OUTPATIENT
Start: 2025-04-10

## 2025-04-10 NOTE — PROGRESS NOTES
[facility] [address], [address]  [facility] [address]  [phone]            Neurology follow-up visit     Referred By: Dr. Stewart ref. provider found    Chief Complaint:   Chief Complaint   Patient presents with    Neurologic Problem     LOV 9/11/2024 Seen for MG    Patient here today 6 month follow up for medication management · Mycophenolate Mofetil. Denies numbness,tingling, dizziness or nausea.   Patient gives verbal consent to use [facility].        HPI:     Sharda Rai is a 89 year old female, who presents for myasthenia gravis.  Patient with history of myasthenia gravis since 2019.  Developed ocular symptoms, proptosis and double vision.  She was seropositive.  At first she was treated with IVIG for few months, she was treated with steroids, eventually she was switched from steroids to mycophenolate 500 g twice a day and she was doing quite well and for this couple years as reported first visit with me in September 2024.  She was not sure but she might have had some increased frequency of clearing her throat.  But no choking, she was eating fine.    Patient was continued with mycophenolate.  Patient came back for follow up in April 2025.     History of Present Illness  Mariam Rai is a 89 year old female with myasthenia gravis who presents with a history of pneumonia and bladder issues.    She has myasthenia gravis and takes mycophenolate, although it is not always administered between meals. She carries a list of antibiotics safe for her condition, as some can exacerbate myasthenia gravis. She has experienced ptosis but no significant worsening of myasthenia symptoms. She is aware of the potential for muscle weakness, particularly in frequently used muscles such as those for breathing and eye movement.    Earlier this year, she was hospitalized on New Year's Mariel for pneumonia and another viral infection. She did not require intubation and had no breathing  difficulties. She has since recovered and is not experiencing any respiratory issues related to myasthenia gravis.    Following her pneumonia, she experienced frequent urination and hematuria, leading to another hospitalization where her bladder was flushed. She spent nearly two months bedridden, resulting in a pressure ulcer that remains unhealed.    She has neuropathy in her feet, which has progressed slightly above the ankles. She takes gabapentin, initially prescribed for hip pain and arthritis in her lower back, which she believes helps.    She finds meloxicam beneficial for her arthritis, allowing her to resume exercising. She has arthritis in her lower back and hands.    She experiences blurry vision, which she attributes to her glasses, and plans to have an eye exam. No diplopia, but she notes difficulty focusing.    She developed a pressure ulcer during her prolonged hospitalization. It is being managed with regular care from a nurse and a wound doctor.        Past Medical History:    Blepharitis    Dermatochalasis    Dry eye syndrome of both eyes    Essential hypertension    Gallstones    High blood pressure    High cholesterol    Hyperlipidemia    Macular pucker, bilateral    Myasthenia gravis (HCC)    Pancreatic insufficiency (HCC)    Pseudophakia, both eyes       Past Surgical History:   Procedure Laterality Date    Carpal tunnel release Bilateral     Cataract      Cataract extraction w/  intraocular lens implant Left 02/20/2013    Dr. Roseann frost trad     Cataract extraction w/  intraocular lens implant Right     Cholecystectomy      Laparoscopic cholecystectomy  10/19/2024    XI ROBOT-ASSISTED LAPAROSCOPIC CHOLECYSTECTOMY    Yag capsulotomy - od - right eye Right 04/10/2007       Social history:  History   Smoking Status    Never   Smokeless Tobacco    Never     History   Alcohol Use Never     History   Drug Use Unknown       Family History   Problem Relation Age of Onset    Retinal detachment Father      Glaucoma Mother          Current Outpatient Medications:     cefpodoxime 100 MG Oral Tab, Take 1 tablet (100 mg total) by mouth in the morning and 1 tablet (100 mg total) in the evening., Disp: , Rfl:     sulfamethoxazole-trimethoprim -160 MG Oral Tab per tablet, Take 1 tablet by mouth 2 (two) times daily., Disp: , Rfl:     phenylephrine-min oil-brittany 0.25-14-74.9 % Rectal Ointment, Apply to rectum four times a day as needed, Disp: , Rfl:     aspirin 81 MG Oral Tab EC, Take 1 tablet (81 mg total) by mouth daily., Disp: , Rfl:     Pancrelipase, Lip-Prot-Amyl, (CREON) 89955-434058 units Oral Cap DR Particles, Take 1 capsule by mouth daily with breakfast., Disp: , Rfl:     Pancrelipase, Lip-Prot-Amyl, (CREON) 91942-595282 units Oral Cap DR Particles, Take 2 capsules by mouth 2 (two) times daily with meals. 2 capsules with lunch and dinner, Disp: , Rfl:     polyethylene glycol, PEG 3350, (GLYCOLAX) 17 GM/SCOOP Oral Powder, Take 17 g by mouth as needed (constipation)., Disp: , Rfl:     Hydrocortisone, Perianal, (PREPARATION H) 1 % External Cream, Apply 1 Application topically 2 (two) times daily. For hemorrhoids, Disp: , Rfl:     dextromethorphan-guaifenesin ER  MG Oral Tablet 12 Hr, Take 1 tablet by mouth 2 (two) times daily as needed (allergy symptoms)., Disp: , Rfl:     gabapentin 300 MG Oral Cap, Take 1 capsule (300 mg total) by mouth nightly., Disp: 90 capsule, Rfl: 1    acetaminophen 325 MG Oral Tab, Take 2 tablets (650 mg total) by mouth every 6 (six) hours as needed., Disp: , Rfl:     pantoprazole 40 MG Oral Tab EC, Take 1 tablet (40 mg total) by mouth every morning., Disp: , Rfl:     cholecalciferol 25 MCG (1000 UT) Oral Tab, Take 1 tablet (1,000 Units total) by mouth daily., Disp: , Rfl:     loperamide (IMODIUM A-D) 2 MG Oral Cap, Take 1 capsule (2 mg total) by mouth every evening., Disp: , Rfl:     multivitamin Oral Tab, Take 1 tablet by mouth daily., Disp: , Rfl:     Mycophenolate Mofetil 500  MG Oral Tab, Take 1 tablet (500 mg total) by mouth 2 (two) times daily., Disp: 180 tablet, Rfl: 3    Metoprolol Succinate ER 50 MG Oral Tablet 24 Hr, Take 1 tablet (50 mg total) by mouth daily. Hold if SBP is below 100; HR <60, Disp: , Rfl:     atorvastatin 40 MG Oral Tab, Take 1 tablet (40 mg total) by mouth nightly., Disp: , Rfl:     B Complex Vitamins (VITAMIN B COMPLEX OR), Take 1 tablet by mouth daily., Disp: , Rfl:     Allergies   Allergen Reactions    Fish-Derived Products OTHER (SEE COMMENTS)     Stomach cramps    Fish Allergy DIARRHEA    Guaifenesin DIARRHEA    Shellfish DIARRHEA       ROS:   As in HPI, the rest of the 14 system review was done and was negative      Physical Exam:  There were no vitals filed for this visit.      General: No apparent distress, well nourished, well groomed.  Head- Normocephalic, atraumatic  Eyes- No redness or swelling  ENT- Hearing intake, normal glutition  Neck- No masses or adenopathy  Cv: pulses were palpable and normal, no cyanosis or edema     Neurological:     Mental Status- Alert and oriented x3.  Normal attention span and concentration  Thought process intact  Memory intact- recent and remote  Mood intact  Fund of knowledge appropriate for education and age    Language intact including: comprehension, naming, repetition, vocabulary    Cranial Nerves:    V. Facial sensation intact  VII. Face symmetric, no facial weakness  VIII. Hearing intact to whisper.  IX. Pallet elevates symmetrically.  XI. Shoulder shrug is intact  XII. Tongue is midline    Motor Exam:  Muscle tone normal  No atrophy or fasciculations  Strength- upper extremities 5/5 proximally and distally                    Rapid alternating movements intact    Gait:  Normal posture  Normal physiologic      Labs:    Lab Results   Component Value Date    TSH 3.27 08/30/2018     Lab Results   Component Value Date    HDL 42 10/10/2019    LDL 62 10/10/2019    TRIG 204 (H) 10/10/2019     Lab Results   Component  Value Date    HGB 10.9 (L) 02/03/2025    HCT 31.7 (L) 02/03/2025    MCV 89.5 02/03/2025    WBC 8.3 02/03/2025    .0 02/03/2025      Lab Results   Component Value Date    BUN 8 (L) 02/03/2025    CA 8.0 (L) 02/03/2025    ALT 17 01/29/2025    AST 22 01/29/2025    ALB 3.5 01/29/2025     (L) 02/03/2025    K 3.5 02/03/2025    CL 97 (L) 02/03/2025    CO2 32.0 02/03/2025      I have reviewed labs.      Assessment   1. MG (myasthenia gravis) (HCC)  Seropositive myasthenia gravis, no thymoma.  We discussed possibility of increasing the dose of mycophenolate and may be doing bridging with IVIG if she gets worse in terms of her bulbar symptoms.  Patient declined that option at this time.  She was instructed to keep them watch over her symptoms and if any worsening she will have to call us back and we will discuss changing her treatment again.             Education and counseling provided to patient. Instructed patient to call my office or seek medical attention immediately if symptoms worsen.  Patient verbalized understanding of information given. All questions were answered. All side effects of drugs were discussed.     Return to clinic in: No follow-ups on file.    William Castillo MD

## (undated) DEVICE — COLUMN DRAPE

## (undated) DEVICE — DRAPE,ABDOMINAL,MAJOR,STERILE: Brand: MEDLINE

## (undated) DEVICE — VISUALIZATION SYSTEM: Brand: CLEARIFY

## (undated) DEVICE — SUT PROL 4-0 36IN RB-1 NABSRB BLU 17MM 1/2 CI

## (undated) DEVICE — GLOVE SUR 7 SENSICARE PI PIP CRM PWD F

## (undated) DEVICE — SUT COAT VCRL + 0 54IN ABSRB UD ANTIBACT

## (undated) DEVICE — TOWEL,OR,DSP,ST,BLUE,DLX,2/PK,40PK/CS: Brand: MEDLINE

## (undated) DEVICE — MONOPOLAR CURVED SCISSORS: Brand: ENDOWRIST

## (undated) DEVICE — ANCHOR TISSUE RETRIEVAL SYSTEM, BAG SIZE 175 ML, PORT SIZE 10 MM: Brand: ANCHOR TISSUE RETRIEVAL SYSTEM

## (undated) DEVICE — PERMANENT CAUTERY HOOK: Brand: ENDOWRIST

## (undated) DEVICE — 3M™ IOBAN™ 2 ANTIMICROBIAL INCISE DRAPE 6650EZ: Brand: IOBAN™ 2

## (undated) DEVICE — FENESTRATED BIPOLAR FORCEPS: Brand: ENDOWRIST

## (undated) DEVICE — SOLUTION IRRIG 3000ML 0.9% NACL FLX CONT

## (undated) DEVICE — ROBOTIC: Brand: MEDLINE INDUSTRIES, INC.

## (undated) DEVICE — CANNULA SEAL

## (undated) DEVICE — LARGE HEM-O-LOK CLIP APPLIER: Brand: ENDOWRIST

## (undated) DEVICE — UNDYED BRAIDED (POLYGLACTIN 910), SYNTHETIC ABSORBABLE SUTURE: Brand: COATED VICRYL

## (undated) DEVICE — TUBING MEGADYNE LAPAROSCOPIC

## (undated) DEVICE — SUT COAT VCRL+ 0 27IN UR-6 ABSRB VLT ANTIBACT

## (undated) DEVICE — PROGRASP FORCEPS: Brand: ENDOWRIST

## (undated) DEVICE — INSUFFLATION NEEDLE TO ESTABLISH PNEUMOPERITONEUM.: Brand: INSUFFLATION NEEDLE

## (undated) DEVICE — CLIP INT L POLYMER LOK LIG HEM O LOK

## (undated) DEVICE — BLADELESS OBTURATOR: Brand: WECK VISTA

## (undated) DEVICE — PAD POS 36IN DISP SURGYPAD

## (undated) DEVICE — ARM DRAPE

## (undated) DEVICE — SOLUTION IRRIG 1000ML 0.9% NACL USP BTL

## (undated) DEVICE — TIP COVER ACCESSORY

## (undated) DEVICE — GAMMEX® PI HYBRID SIZE 6, STERILE POWDER-FREE SURGICAL GLOVE, POLYISOPRENE AND NEOPRENE BLEND: Brand: GAMMEX

## (undated) NOTE — LETTER
[facility]  [address], [place], [place]    Authorization for Surgical Operation and Procedure                               I hereby authorize Dr. Toribio, my physician and his/her assistants (if applicable), which may include medical students, residents, and/or fellows, to perform the following surgical operation/ procedure and administer such anesthesia as may be determined necessary by my physician: Operation/Procedure name Robot Assisted Laparoscopic Cholecystectomy on Sharda Rai   2.   I recognize that during the surgical operation/procedure, unforeseen conditions may necessitate additional or different procedures than those listed above.  I, therefore, further authorize and request that the above-named surgeon, assistants, or designees perform such procedures as are, in their judgment, necessary and desirable.    3.   My surgeon/physician has discussed prior to my surgery the potential benefits, risks and side effects of this procedure; the likelihood of achieving goals; and potential problems that might occur during recuperation.  They also discussed reasonable alternatives to the procedure, including risks, benefits, and side effects related to the alternatives and risks related to not receiving this procedure.  I have had all my questions answered and I acknowledge that no guarantee has been made as to the result that may be obtained.    4.   Should the need arise during my operation/procedure, which includes change of level of care prior to discharge, I also consent to the administration of blood and/or blood products.  Further, I understand that despite careful testing and screening of blood or blood products by collecting agencies, I may still be subject to ill effects as a result of receiving a blood transfusion and/or blood products.  The following are some, but not all, of the potential risks that can occur: fever and allergic reactions, hemolytic reactions, transmission  of diseases such as Hepatitis, AIDS and Cytomegalovirus (CMV) and fluid overload.  In the event that I wish to have an autologous transfusion of my own blood, or a directed donor transfusion, I will discuss this with my physician.  Check only if Refusing Blood or Blood Products  I understand refusal of blood or blood products as deemed necessary by my physician may have serious consequences to my condition to include possible death. I hereby assume responsibility for my refusal and release the hospital, its personnel, and my physicians from any responsibility for the consequences of my refusal.    o  Refuse   5.   I authorize the use of any specimen, organs, tissues, body parts or foreign objects that may be removed from my body during the operation/procedure for diagnosis, research or teaching purposes and their subsequent disposal by hospital authorities.  I also authorize the release of specimen test results and/or written reports to my treating physician on the hospital medical staff or other referring or consulting physicians involved in my care, at the discretion of the Pathologist or my treating physician.    6.   I consent to the photographing or videotaping of the operations or procedures to be performed, including appropriate portions of my body for medical, scientific, or educational purposes, provided my identity is not revealed by the pictures or by descriptive texts accompanying them.  If the procedure has been photographed/videotaped, the surgeon will obtain the original picture, image, videotape or CD.  The hospital will not be responsible for storage, release or maintenance of the picture, image, tape or CD.    7.   I consent to the presence of a  or observers in the operating room as deemed necessary by my physician or their designees.    8.   I recognize that in the event my procedure results in extended X-Ray/fluoroscopy time, I may develop a skin reaction.    9. If I have a Do  Not Attempt Resuscitation (DNAR) order in place, that status will be suspended while in the operating room, procedural suite, and during the recovery period unless otherwise explicitly stated by me (or a person authorized to consent on my behalf). The surgeon or my attending physician will determine when the applicable recovery period ends for purposes of reinstating the DNAR order.  10. Patients having a sterilization procedure: I understand that if the procedure is successful the results will be permanent and it will therefore be impossible for me to inseminate, conceive, or bear children.  I also understand that the procedure is intended to result in sterility, although the result has not been guaranteed.   11. I acknowledge that my physician has explained sedation/analgesia administration to me including the risk and benefits I consent to the administration of sedation/analgesia as may be necessary or desirable in the judgment of my physician.    I CERTIFY THAT I HAVE READ AND FULLY UNDERSTAND THE ABOVE CONSENT TO OPERATION and/or OTHER PROCEDURE.     ____________________________________  _________________________________        ______________________________  Signature of Patient    Signature of Responsible Person                Printed Name of Responsible Person                                      ____________________________________  _____________________________                ________________________________  Signature of Witness        Date  Time         Relationship to Patient    STATEMENT OF PHYSICIAN My signature below affirms that prior to the time of the procedure; I have explained to the patient and/or his/her legal representative, the risks and benefits involved in the proposed treatment and any reasonable alternative to the proposed treatment. I have also explained the risks and benefits involved in refusal of the proposed treatment and alternatives to the proposed treatment and have answered the  patient's questions. If I have a significant financial interest in a co-management agreement or a significant financial interest in any product or implant, or other significant relationship used in this procedure/surgery, I have disclosed this and had a discussion with my patient.     _____________________________________________________              _____________________________  (Signature of Physician)                                                                                         (Date)                                   (Time)  Patient Name: Sharda Rai      : 1932      Printed: 10/19/2024     Medical Record #: [id number]                                      Page 1 of 1